# Patient Record
Sex: FEMALE | Race: WHITE | NOT HISPANIC OR LATINO | Employment: OTHER | URBAN - METROPOLITAN AREA
[De-identification: names, ages, dates, MRNs, and addresses within clinical notes are randomized per-mention and may not be internally consistent; named-entity substitution may affect disease eponyms.]

---

## 2017-03-16 DIAGNOSIS — I48.19 PERSISTENT ATRIAL FIBRILLATION (HCC): ICD-10-CM

## 2017-03-16 RX ORDER — APIXABAN 5 MG/1
TABLET, FILM COATED ORAL
Qty: 60 TAB | Refills: 11 | Status: SHIPPED | OUTPATIENT
Start: 2017-03-16 | End: 2017-08-15

## 2017-03-23 DIAGNOSIS — I10 ESSENTIAL HYPERTENSION: ICD-10-CM

## 2017-03-23 RX ORDER — ATENOLOL 50 MG/1
TABLET ORAL
Qty: 60 TAB | Refills: 11 | Status: SHIPPED | OUTPATIENT
Start: 2017-03-23 | End: 2017-08-21

## 2017-04-07 DIAGNOSIS — E78.5 HYPERLIPIDEMIA, UNSPECIFIED HYPERLIPIDEMIA TYPE: ICD-10-CM

## 2017-04-07 RX ORDER — ATORVASTATIN CALCIUM 40 MG/1
TABLET, FILM COATED ORAL
Qty: 90 TAB | Refills: 2 | Status: SHIPPED | OUTPATIENT
Start: 2017-04-07 | End: 2018-02-24 | Stop reason: SDUPTHER

## 2017-04-28 DIAGNOSIS — I10 ESSENTIAL HYPERTENSION: ICD-10-CM

## 2017-05-01 RX ORDER — LISINOPRIL 5 MG/1
TABLET ORAL
Qty: 180 TAB | Refills: 3 | Status: SHIPPED | OUTPATIENT
Start: 2017-05-01 | End: 2017-08-21

## 2017-08-14 ENCOUNTER — TELEPHONE (OUTPATIENT)
Dept: CARDIOLOGY | Facility: MEDICAL CENTER | Age: 77
End: 2017-08-14

## 2017-08-14 NOTE — TELEPHONE ENCOUNTER
----- Message from Dari Hernadez sent at 8/14/2017  2:21 PM PDT -----  Regarding: patient hit her donut hole on her Eliquis  RICHA/Sarahy      Patient has hit the donut hole with her Eliquis and wants to know if she can take aspirin instead. She can be reached at [phone].

## 2017-08-14 NOTE — TELEPHONE ENCOUNTER
"Has 2-3 pills left of Eliquis & will be charged $160 for next fill, and even more next fill after that due to \"donut hole\".  She has limited funds until next paycheck. I told her unlikely Dr. Contreras would allow aspirin for her therapy. I gave her the assistance phone numbers for both [employer] & Medicare assistance. Lives in [place], so samples from [facility]. Pharmacy not practical.    I told her warfarin was another option. She's willing to do warfarin if doesn't qualify for any aid. To Dr. Contreras to advise if OK to switch to warfarin? Start on 5 mg?   "

## 2017-08-15 DIAGNOSIS — G45.9 TRANSIENT CEREBRAL ISCHEMIA, UNSPECIFIED TYPE: ICD-10-CM

## 2017-08-15 DIAGNOSIS — I48.0 PAF (PAROXYSMAL ATRIAL FIBRILLATION) (HCC): ICD-10-CM

## 2017-08-15 DIAGNOSIS — I48.91 ATRIAL FIBRILLATION, UNSPECIFIED TYPE (HCC): ICD-10-CM

## 2017-08-15 RX ORDER — WARFARIN SODIUM 5 MG/1
5 TABLET ORAL DAILY
Qty: 30 TAB | Refills: 6 | OUTPATIENT
Start: 2017-08-15 | End: 2018-02-06 | Stop reason: SDUPTHER

## 2017-08-15 NOTE — TELEPHONE ENCOUNTER
First Rx for warfarin 5 mg called to [facility] in [place]. Referral to [facility] done. I s/w Virginia Mcarthur who advised starting warfarin tonight & taking both warfarin & Eliquis until lab draw this Fri. Lab order was faxed to [facility]. Pt was instructed in all of this & v/u.

## 2017-08-16 ENCOUNTER — TELEPHONE (OUTPATIENT)
Dept: VASCULAR LAB | Facility: MEDICAL CENTER | Age: 77
End: 2017-08-16

## 2017-08-16 NOTE — TELEPHONE ENCOUNTER
Spoke with Laurie to verify that she is indeed taking both warfarin and Eliquis.  She agrees to get to the Lab early Friday morning for INR testing. SO faxed to [facility] Virginia Mcarthur, NICHOLASD

## 2017-08-18 ENCOUNTER — TELEPHONE (OUTPATIENT)
Dept: CARDIOLOGY | Facility: MEDICAL CENTER | Age: 77
End: 2017-08-18

## 2017-08-18 NOTE — TELEPHONE ENCOUNTER
----- Message from Radha Bello sent at 8/18/2017  9:50 AM PDT -----  Regarding: clearance for knee scope  Contact: [phone]  RICHA/ankuhs Bolaños calling from Dr Chago Rodriguez's office for clearance to do a knee scope, date of procedure pending clearance.  Please call Miky at  if questions.  Fax# [phone]

## 2017-08-18 NOTE — TELEPHONE ENCOUNTER
Dr. Contreras, can patient be cleared for knee scope?  If OK, how long can she hold her warfarin for procedure?

## 2017-08-19 LAB — INR PPP: 1.9 (ref 2–3.5)

## 2017-08-21 ENCOUNTER — ANTICOAGULATION MONITORING (OUTPATIENT)
Dept: VASCULAR LAB | Facility: MEDICAL CENTER | Age: 77
End: 2017-08-21

## 2017-08-21 DIAGNOSIS — G45.9 TRANSIENT CEREBRAL ISCHEMIA, UNSPECIFIED TYPE: ICD-10-CM

## 2017-08-21 DIAGNOSIS — I48.0 PAROXYSMAL ATRIAL FIBRILLATION (HCC): ICD-10-CM

## 2017-08-21 PROBLEM — I48.91 ATRIAL FIBRILLATION (HCC): Status: ACTIVE | Noted: 2017-08-21

## 2017-08-21 LAB — INR PPP: 2 (ref 2–3.5)

## 2017-08-21 RX ORDER — ANTIOX #8/OM3/DHA/EPA/LUT/ZEAX 250-2.5 MG
1 CAPSULE ORAL DAILY
Qty: 1 CAP | Refills: 0
Start: 2017-08-21 | End: 2018-04-26

## 2017-08-21 RX ORDER — METOPROLOL SUCCINATE 100 MG/1
100 TABLET, EXTENDED RELEASE ORAL DAILY
Qty: 30 TAB | Refills: 1
Start: 2017-08-21 | End: 2019-03-26 | Stop reason: SDUPTHER

## 2017-08-21 NOTE — PROGRESS NOTES
OP Anticoagulation Service Note    Date: 8/21/2017    Anticoagulation Summary as of 8/21/2017     INR goal 2.0-3.0   Selected INR 1.9! (8/19/2017)   Maintenance plan 5 mg (5 mg x 1) every day   Weekly total 35 mg   Plan last modified Yessica Nuñez, SRAVAN (8/21/2017)   Next INR check    Target end date Indefinite    Indications   TIA (transient ischemic attack) [G45.9]  Atrial fibrillation (CMS-HCC) [I48.91] [I48.91]         Anticoagulation Episode Summary     INR check location Outside Lab    Preferred lab     Send INR reminders to     Comments [place]      Anticoagulation Care Providers     Provider Role Specialty Phone number    Virginia Mcarthur, NICHOLASD       Narciso Contreras M.D.  Cardiology [phone]          Plan:  INR is subtherapeutic. Pt is new to our services. No answer. Left VM. Appears she is on Eliquis and warfarin, transitioning due to cost. Left  instructing pt to continue Eliquis with warfarin and to recheck INR today. Instructed pt to please call back for education on warfarin.       Yessica Nuñez, Pharm D

## 2017-08-21 NOTE — PROGRESS NOTES
OP Anticoagulation Service Note    Date: 8/21/2017     Anticoagulation Summary as of 8/21/2017     INR goal 2.0-3.0   Selected INR No new INR was available at the time of this encounter.   Maintenance plan 5 mg (5 mg x 1) every day   Weekly total 35 mg   Plan last modified Yessica Nuñez PHARMD (8/21/2017)   Next INR check    Target end date Indefinite    Indications   TIA (transient ischemic attack) [G45.9]  Atrial fibrillation (CMS-HCC) [I48.91] [I48.91]         Anticoagulation Episode Summary     INR check location Outside Lab    Preferred lab     Send INR reminders to     Comments [facility]      Anticoagulation Care Providers     Provider Role Specialty Phone number    SRAVAN Moore M.D.  Cardiology [phone]          Plan: INR is subtherapeutic. She is new to warfarin, switching from Eliquis. She reports she stopped Eliquis on Friday and has only been taking warfarin 5 mg daily. Her son is a pharmacist who gave her education. I also provided her with education. CHADS-VASC = 6. Provided pt education on warfarin. Including how to take, what to do if missed dose, importance of INR monitoring, drug and food interactions. Patient is aware to avoid NSAIDs and ASA (unless directed by provider). Educated pt on s/s of bleeding and thrombosis. Patient is aware to seek medical attention for severe falls or injury to their heads, to report all medication changes to our office and to notify our office of unusual bleeding or bruising. Medication reviewed and updated. Pt to go to lab today and continue current dose of warfarin.     Yessica Nuñez PHARMD

## 2017-08-21 NOTE — PROGRESS NOTES
Anticoagulation Summary as of 8/21/2017     INR goal 2.0-3.0   Selected INR 2.0 (8/21/2017)   Maintenance plan 5 mg (5 mg x 1) every day   Weekly total 35 mg   Plan last modified NICHOLAS MartinezD (8/21/2017)   Next INR check 8/28/2017   Target end date Indefinite    Indications   TIA (transient ischemic attack) [G45.9]  Atrial fibrillation (CMS-HCC) [I48.91] [I48.91]         Anticoagulation Episode Summary     INR check location Outside Lab    Preferred lab     Send INR reminders to     Comments [place]      Anticoagulation Care Providers     Provider Role Specialty Phone number    NICHOLAS MooreD       Narciso Contreras M.D.  Cardiology [phone]        Anticoagulation Patient Findings      Spoke with patient to report a therapeutic INR.   Pt started warfarin 7 days ago and stopped Eliquis 3 days ago.  Pt instructed to continue with current warfarin dosing regimen, confirms dosing.   Pt denies any s/s of bleeding, bruising, clotting or any changes to diet or medication.    Will follow up in 1 week per pt preference.     Estee Mustafa, PHARMD

## 2017-08-22 ENCOUNTER — ANTICOAGULATION MONITORING (OUTPATIENT)
Dept: VASCULAR LAB | Facility: MEDICAL CENTER | Age: 77
End: 2017-08-22

## 2017-08-22 ENCOUNTER — TELEPHONE (OUTPATIENT)
Dept: VASCULAR LAB | Facility: MEDICAL CENTER | Age: 77
End: 2017-08-22

## 2017-08-22 DIAGNOSIS — G45.9 TRANSIENT CEREBRAL ISCHEMIA, UNSPECIFIED TYPE: ICD-10-CM

## 2017-08-22 DIAGNOSIS — I48.0 PAROXYSMAL ATRIAL FIBRILLATION (HCC): ICD-10-CM

## 2017-08-22 NOTE — TELEPHONE ENCOUNTER
Initial anticoagulation clinic note and most recent cardiology note reviewed.  Patient with atrial fibrillation andCHADS-VASC score = approximately 6, including history of TIA    Will continue with indefinite anticoagulation with warfarin as recommended by cardiology.  Agree with plans for anticoagulation as outlined by anticoagulation clinical staff.  Will defer management of rhythm and rate control as well as all other cardiovascular issues to cardiology.  Will defer management of all other medical issues to cardiologist and PCP    Michael J Bloch, MD  Anticoagulation Clinic    cc:  Олег Colin

## 2017-08-28 LAB — INR PPP: 2 (ref 2–3.5)

## 2017-08-29 ENCOUNTER — ANTICOAGULATION MONITORING (OUTPATIENT)
Dept: VASCULAR LAB | Facility: MEDICAL CENTER | Age: 77
End: 2017-08-29

## 2017-08-29 DIAGNOSIS — I48.0 PAROXYSMAL ATRIAL FIBRILLATION (HCC): ICD-10-CM

## 2017-08-29 DIAGNOSIS — G45.0 VERTEBROBASILAR ARTERY SYNDROME: ICD-10-CM

## 2017-08-29 NOTE — PROGRESS NOTES
Anticoagulation Summary  As of 8/29/2017    INR goal:   2.0-3.0   TTR:   --   Today's INR:   2.0 (8/28/2017)   Maintenance plan:   5 mg (5 mg x 1) every day   Weekly total:   35 mg   Plan last modified:   Yessica Nuñez PharmD (8/21/2017)   Next INR check:   9/11/2017   Target end date:   Indefinite    Indications    TIA (transient ischemic attack) [G45.9]  Atrial fibrillation (CMS-HCC) [I48.91] [I48.91]             Anticoagulation Episode Summary     INR check location:   Outside Lab    Preferred lab:       Send INR reminders to:       Comments:   [facility]      Anticoagulation Care Providers     Provider Role Specialty Phone number    Darlene Moore M.D.  Cardiology [phone]          Left voicemail message to report a therapeutic INR of 2.0.  Pt to continue with current warfarin dosing regimen. Requested pt contact the clinic for any s/s of unusual bleeding, bruising, clotting or any changes to diet or medication. FU 2 weeks.  Virginia Mcarthur PharmD

## 2017-09-07 ENCOUNTER — ANTICOAGULATION MONITORING (OUTPATIENT)
Dept: VASCULAR LAB | Facility: MEDICAL CENTER | Age: 77
End: 2017-09-07

## 2017-09-07 DIAGNOSIS — G45.0 VERTEBROBASILAR ARTERY SYNDROME: ICD-10-CM

## 2017-09-07 DIAGNOSIS — I48.0 PAROXYSMAL ATRIAL FIBRILLATION (HCC): ICD-10-CM

## 2017-09-07 NOTE — PROGRESS NOTES
Spoke with pt on the phone she reports she will be stopping her coumadin today for a knee procedure 9-12-17. Per cardiology ok for pt to stop without bridging. CHADS: 4 with hx of stroke and TIA. Instructed pt to resume warfarin at usual regimen when ok with provider doing procedure. She is to recheck 1 week after procedure.     Yessica Nuñez, Pharm D

## 2017-10-03 LAB — INR PPP: 1.2 (ref 2–3.5)

## 2017-10-04 ENCOUNTER — ANTICOAGULATION MONITORING (OUTPATIENT)
Dept: VASCULAR LAB | Facility: MEDICAL CENTER | Age: 77
End: 2017-10-04

## 2017-10-04 NOTE — PROGRESS NOTES
Anticoagulation Summary  As of 10/4/2017    INR goal:   2.0-3.0   TTR:   0.0 % (1.1 mo)   Today's INR:   1.2! (10/3/2017)   Maintenance plan:   5 mg (5 mg x 1) every day   Weekly total:   35 mg   Plan last modified:   Yessica Nuñez PharmD (8/21/2017)   Next INR check:   10/10/2017   Target end date:   Indefinite    Indications    TIA (transient ischemic attack) [G45.9]  Atrial fibrillation (CMS-HCC) [I48.91] [I48.91]             Anticoagulation Episode Summary     INR check location:   Outside Lab    Preferred lab:       Send INR reminders to:       Comments:   [facility]      Anticoagulation Care Providers     Provider Role Specialty Phone number    Darlene Moore M.D.  Cardiology [phone]        Anticoagulation Patient Findings      Spoke with patient.  INR is SUB therapeutic.   Pt denies any unusual s/s of bleeding, bruising, clotting or any changes to diet or medications. Denies any etoh, cranberries, supplements, or illness.   Pt verifies warfarin weekly dosing. No bridging per previous note.    Will have pt take a boost dose 10mg x2 days and then resume her normal warfarin dosing.     Repeat INR in 1 weeks.     Estee Mustafa, PharmD

## 2017-10-11 ENCOUNTER — ANTICOAGULATION MONITORING (OUTPATIENT)
Dept: VASCULAR LAB | Facility: MEDICAL CENTER | Age: 77
End: 2017-10-11

## 2017-10-11 DIAGNOSIS — I48.91 ATRIAL FIBRILLATION, UNSPECIFIED TYPE (HCC): ICD-10-CM

## 2017-10-11 DIAGNOSIS — G45.9 TRANSIENT CEREBRAL ISCHEMIA, UNSPECIFIED TYPE: ICD-10-CM

## 2017-10-11 LAB — INR PPP: 1 (ref 2–3.5)

## 2017-10-11 NOTE — PROGRESS NOTES
Anticoagulation Summary  As of 10/11/2017    INR goal:   2.0-3.0   TTR:   0.0 % (1.4 mo)   Today's INR:   1.0!   Maintenance plan:   5 mg (5 mg x 1) every day   Weekly total:   35 mg   Plan last modified:   Yessica Nuñez, PharmD (8/21/2017)   Next INR check:   10/16/2017   Target end date:   Indefinite    Indications    TIA (transient ischemic attack) [G45.9]  Atrial fibrillation (CMS-HCC) [I48.91] [I48.91]             Anticoagulation Episode Summary     INR check location:   Outside Lab    Preferred lab:       Send INR reminders to:       Comments:   [facility]      Anticoagulation Care Providers     Provider Role Specialty Phone number    Virginia Mcarthur PharmD       Narciso Contreras M.D.  Cardiology [phone]        Anticoagulation Patient Findings  Patient Findings     Negatives:   Signs/symptoms of thrombosis, Signs/symptoms of bleeding, Laboratory test error suspected, Change in health, Change in alcohol use, Change in activity, Upcoming invasive procedure, Emergency department visit, Upcoming dental procedure, Missed doses, Extra doses, Change in medications, Change in diet/appetite, Hospital admission, Bruising, Other complaints        Spoke with patient today regarding subtherapeutic INR of 1.0.  Patient denies any signs/symptoms of bruising or bleeding or any changes in diet and medications.  Instructed patient to call clinic with any questions or concerns.  Patient denies missed doses, V8 juice, increase in VitK intake, new supplements, or changes to routine medications.  She is able to verify dosing over the last week.  Instructed her to bolus with 10mg X 2, 7.5mg X 1, then resume current warfarin regimen.  No bridge per previous notes.  Follow up in 5 days.    Tawanda Vivas, MarinD

## 2017-10-16 LAB — INR PPP: 2.2 (ref 2–3.5)

## 2017-10-17 ENCOUNTER — ANTICOAGULATION MONITORING (OUTPATIENT)
Dept: VASCULAR LAB | Facility: MEDICAL CENTER | Age: 77
End: 2017-10-17

## 2017-10-17 DIAGNOSIS — I48.91 ATRIAL FIBRILLATION, UNSPECIFIED TYPE (HCC): ICD-10-CM

## 2017-10-17 DIAGNOSIS — G45.8 OTHER SPECIFIED TRANSIENT CEREBRAL ISCHEMIAS: ICD-10-CM

## 2017-10-18 ENCOUNTER — ANTICOAGULATION MONITORING (OUTPATIENT)
Dept: VASCULAR LAB | Facility: MEDICAL CENTER | Age: 77
End: 2017-10-18

## 2017-10-18 DIAGNOSIS — G45.8 OTHER SPECIFIED TRANSIENT CEREBRAL ISCHEMIAS: ICD-10-CM

## 2017-10-18 DIAGNOSIS — I48.91 ATRIAL FIBRILLATION, UNSPECIFIED TYPE (HCC): ICD-10-CM

## 2017-10-18 LAB — INR PPP: 2.2 (ref 2–3.5)

## 2017-10-18 NOTE — PROGRESS NOTES
OP Anticoagulation Telephone Note    Date: 10/18/2017  Anticoagulation Summary  As of 10/18/2017    INR goal:   2.0-3.0   TTR:   1.8 % (1.5 mo)   Today's INR:   2.2 (10/16/2017)   Maintenance plan:   5 mg (5 mg x 1) every day   Weekly total:   35 mg   No change documented:   Helena Torres, Med Ass't   Plan last modified:   Yessica Nuñez PharmD (8/21/2017)   Next INR check:   10/23/2017   Target end date:   Indefinite    Indications    TIA (transient ischemic attack) [G45.9]  Atrial fibrillation (CMS-HCC) [I48.91] [I48.91]             Anticoagulation Episode Summary     INR check location:   Outside Lab    Preferred lab:       Send INR reminders to:       Comments:   [facility]      Anticoagulation Care Providers     Provider Role Specialty Phone number    Darlene Moore M.D.  Cardiology [phone]        Anticoagulation Patient Findings  Patient Findings     Negatives:   Signs/symptoms of thrombosis, Signs/symptoms of bleeding, Laboratory test error suspected, Change in health, Change in alcohol use, Change in activity, Upcoming invasive procedure, Emergency department visit, Upcoming dental procedure, Missed doses, Extra doses, Change in medications, Change in diet/appetite, Hospital admission, Bruising, Other complaints      Plan:  Spoke with patient on the phone. Patient is therapeutic today. Patient denies any changes in medications or diet. Patient denies any signs or symptoms of bleeding or clotting. Instructed patient to call clinic if any unusual bleeding or bruising occurs. Will continue dosing as outlined above. Will follow-up with patient in 1 week.    Helena Torres, Medical Assistant    I have reviewed and agree with the plan above on 10/20/2017      Virginia Mcarthur, MarinD

## 2017-10-24 ENCOUNTER — ANTICOAGULATION MONITORING (OUTPATIENT)
Dept: VASCULAR LAB | Facility: MEDICAL CENTER | Age: 77
End: 2017-10-24

## 2017-10-24 DIAGNOSIS — G45.8 OTHER SPECIFIED TRANSIENT CEREBRAL ISCHEMIAS: ICD-10-CM

## 2017-10-24 LAB — INR PPP: 1.5 (ref 2–3.5)

## 2017-10-24 NOTE — PROGRESS NOTES
Anticoagulation Summary  As of 10/24/2017    INR goal:   2.0-3.0   TTR:   5.8 % (1.8 mo)   Today's INR:   1.5!   Maintenance plan:   7.5 mg (5 mg x 1.5) on Mon, Wed, Fri; 5 mg (5 mg x 1) all other days   Weekly total:   42.5 mg   Plan last modified:   Tyree Helton, PharmD (10/24/2017)   Next INR check:   10/30/2017   Target end date:   Indefinite    Indications    TIA (transient ischemic attack) [G45.9]  Atrial fibrillation (CMS-HCC) [I48.91] [I48.91]             Anticoagulation Episode Summary     INR check location:   Outside Lab    Preferred lab:       Send INR reminders to:       Comments:   [facility]      Anticoagulation Care Providers     Provider Role Specialty Phone number    Marin MooreD       Narciso Contreras M.D.  Cardiology [phone]        Anticoagulation Patient Findings      HPI:  Laurie Adams, on anticoagulation therapy with warfarin for TIA.   Changes to current medical/health status since last appt: None.   Denies signs/symptoms of bleeding and/or thrombosis since the last appt.    Denies any interval changes to diet  Denies any interval changes to medications since last appt.   Denies any complications or cost restrictions with current therapy.   Denies missed doses.    A/P   INR  SUB-therapeutic.   Bolus today and begin 20% increased regimen.  Given multiple TIA's in the past, bridge for 3 days  CrCl>50 mL/min  Enoxaparin 100 mg q12 hr, forgo 1.5 mg/kg/day given her insurance coverage.   No hx of significant bleeding per pt.     Next INR in 6 days per pt availability.    Tyree Helton, PharmD

## 2017-10-24 NOTE — PROGRESS NOTES
Pt called to report she is in the doughnut hole cannot afford lovenox. Discussed risk of low INR, she can't afford DOAC. She is aware of stroke risk. Will have pt bolus with warfarin as previously planned. Follow up 6 days.     Yessica Nuñez, PharmD

## 2017-10-30 LAB — INR PPP: 1.7 (ref 2–3.5)

## 2017-10-31 ENCOUNTER — TELEPHONE (OUTPATIENT)
Dept: CARDIOLOGY | Facility: MEDICAL CENTER | Age: 77
End: 2017-10-31

## 2017-10-31 ENCOUNTER — ANTICOAGULATION MONITORING (OUTPATIENT)
Dept: VASCULAR LAB | Facility: MEDICAL CENTER | Age: 77
End: 2017-10-31

## 2017-10-31 DIAGNOSIS — G45.8 OTHER SPECIFIED TRANSIENT CEREBRAL ISCHEMIAS: ICD-10-CM

## 2017-10-31 DIAGNOSIS — I48.91 ATRIAL FIBRILLATION, UNSPECIFIED TYPE (HCC): ICD-10-CM

## 2017-10-31 DIAGNOSIS — I10 ESSENTIAL HYPERTENSION: ICD-10-CM

## 2017-10-31 DIAGNOSIS — I48.0 PAROXYSMAL ATRIAL FIBRILLATION (HCC): ICD-10-CM

## 2017-10-31 DIAGNOSIS — G45.0 VERTEBROBASILAR ARTERY SYNDROME: ICD-10-CM

## 2017-10-31 DIAGNOSIS — I48.20 CHRONIC ATRIAL FIBRILLATION (HCC): ICD-10-CM

## 2017-10-31 DIAGNOSIS — E78.5 DYSLIPIDEMIA, GOAL LDL BELOW 100: ICD-10-CM

## 2017-10-31 NOTE — TELEPHONE ENCOUNTER
Notified patient that she was due for annual f/u and labs - appt made and she states [facility] has to [facility] to have PT/INR done for coumadin clinic and she will have her other labs drawn at that time. [facility] and patient will call back with any other questions or concerns

## 2017-10-31 NOTE — TELEPHONE ENCOUNTER
----- Message from Radha Bello sent at 10/31/2017 11:21 AM PDT -----  Regarding: appt timing direction  Contact: [phone]  RICHA/cedric Ashton calling for appt timing direction.  She needs to know when she should be scheduling with RICHA.    Please call Laurie at

## 2017-10-31 NOTE — PROGRESS NOTES
Anticoagulation Summary  As of 10/31/2017    INR goal:   2.0-3.0   TTR:   5.2 % (2 mo)   Today's INR:   1.7! (10/30/2017)   Maintenance plan:   5 mg (5 mg x 1) on Mon, Fri; 7.5 mg (5 mg x 1.5) all other days   Weekly total:   47.5 mg   Plan last modified:   Virginia Mcarthur PharmD (10/31/2017)   Next INR check:   11/6/2017   Target end date:   Indefinite    Indications    TIA (transient ischemic attack) [G45.9]  Atrial fibrillation (CMS-[facility]) [I48.91] [I48.91]             Anticoagulation Episode Summary     INR check location:   Outside Lab    Preferred lab:       Send INR reminders to:       Comments:   [facility]      Anticoagulation Care Providers     Provider Role Specialty Phone number    Darlene Moore M.D.  Cardiology [phone]        Anticoagulation Patient Findings    Spoke with Laurie, to report a sub therapeutic INR  Of 1.7.  Will increase weekly dose by 10% Pt denies any unusual s/s of bleeding, bruising, clotting or any changes to diet or medications.  Follow up in 1 weeks.    Virginia Mcarthur PharmD

## 2017-11-05 DIAGNOSIS — I48.0 PAF (PAROXYSMAL ATRIAL FIBRILLATION) (HCC): ICD-10-CM

## 2017-11-06 ENCOUNTER — ANTICOAGULATION MONITORING (OUTPATIENT)
Dept: VASCULAR LAB | Facility: MEDICAL CENTER | Age: 77
End: 2017-11-06

## 2017-11-06 DIAGNOSIS — G45.8 OTHER SPECIFIED TRANSIENT CEREBRAL ISCHEMIAS: ICD-10-CM

## 2017-11-06 DIAGNOSIS — I48.91 ATRIAL FIBRILLATION, UNSPECIFIED TYPE (HCC): ICD-10-CM

## 2017-11-06 LAB — INR PPP: 2.7 (ref 2–3.5)

## 2017-11-06 RX ORDER — FLECAINIDE ACETATE 100 MG/1
TABLET ORAL
Qty: 60 TAB | Refills: 7 | Status: SHIPPED | OUTPATIENT
Start: 2017-11-06 | End: 2018-07-14 | Stop reason: SDUPTHER

## 2017-11-07 NOTE — PROGRESS NOTES
Anticoagulation Summary  As of 11/6/2017    INR goal:   2.0-3.0   TTR:   12.0 % (2.2 mo)   Today's INR:   2.7   Maintenance plan:   5 mg (5 mg x 1) on Mon, Fri; 7.5 mg (5 mg x 1.5) all other days   Weekly total:   47.5 mg   No change documented:   Bisi Blanton Med Ass't   Plan last modified:   Virginia Mcarthur PharmD (10/31/2017)   Next INR check:   11/13/2017   Target end date:   Indefinite    Indications    TIA (transient ischemic attack) [G45.9]  Atrial fibrillation (CMS-HCC) [I48.91] [I48.91]             Anticoagulation Episode Summary     INR check location:   Outside Lab    Preferred lab:       Send INR reminders to:       Comments:   [facility]      Anticoagulation Care Providers     Provider Role Specialty Phone number    Darlene Moore M.D.  Cardiology [phone]        Anticoagulation Patient Findings  Patient Findings     Negatives:   Signs/symptoms of thrombosis, Signs/symptoms of bleeding, Laboratory test error suspected, Change in health, Change in alcohol use, Change in activity, Upcoming invasive procedure, Emergency department visit, Upcoming dental procedure, Missed doses, Extra doses, Change in medications, Change in diet/appetite, Hospital admission, Bruising, Other complaints        Spoke with patient to report a therapeutic INR.  Pt instructed to continue with current warfarin dosing regimen. Pt denies any s/s of bleeding, bruising, clotting or any changes to diet or medication.  Will follow up in 1 week.    Bisi Blanton Med Ass't  I have reviewed and agree with the plan above on 11/07/2017          Virginia Mcarthur PharmD

## 2017-11-08 DIAGNOSIS — I10 ESSENTIAL HYPERTENSION: ICD-10-CM

## 2017-11-08 DIAGNOSIS — I48.20 CHRONIC ATRIAL FIBRILLATION (HCC): ICD-10-CM

## 2017-11-08 DIAGNOSIS — I48.91 ATRIAL FIBRILLATION, UNSPECIFIED TYPE (HCC): ICD-10-CM

## 2017-11-08 DIAGNOSIS — E78.5 DYSLIPIDEMIA, GOAL LDL BELOW 100: ICD-10-CM

## 2017-11-09 ENCOUNTER — OFFICE VISIT (OUTPATIENT)
Dept: CARDIOLOGY | Facility: CLINIC | Age: 77
End: 2017-11-09
Payer: MEDICARE

## 2017-11-09 VITALS
HEIGHT: 64 IN | BODY MASS INDEX: 34.15 KG/M2 | DIASTOLIC BLOOD PRESSURE: 70 MMHG | WEIGHT: 200 LBS | HEART RATE: 60 BPM | SYSTOLIC BLOOD PRESSURE: 120 MMHG

## 2017-11-09 DIAGNOSIS — I10 ESSENTIAL HYPERTENSION: ICD-10-CM

## 2017-11-09 DIAGNOSIS — E78.00 PURE HYPERCHOLESTEROLEMIA: ICD-10-CM

## 2017-11-09 DIAGNOSIS — I48.0 PAF (PAROXYSMAL ATRIAL FIBRILLATION) (HCC): ICD-10-CM

## 2017-11-09 DIAGNOSIS — Z79.01 CHRONIC ANTICOAGULATION: ICD-10-CM

## 2017-11-09 PROCEDURE — 99214 OFFICE O/P EST MOD 30 MIN: CPT | Performed by: INTERNAL MEDICINE

## 2017-11-09 ASSESSMENT — ENCOUNTER SYMPTOMS
FEVER: 0
DIZZINESS: 1
BRUISES/BLEEDS EASILY: 0
BLURRED VISION: 0
MYALGIAS: 0
NAUSEA: 0
CHILLS: 0
HEADACHES: 0
SHORTNESS OF BREATH: 0
COUGH: 0
PALPITATIONS: 0
EYE DISCHARGE: 0
NERVOUS/ANXIOUS: 0
DEPRESSION: 0
HEARTBURN: 0
PND: 0

## 2017-11-09 NOTE — LETTER
[facility] [address] [place] [address]  Phone: [phone]  Fax: [phone]              Laurie Adams  1940    Encounter Date: 11/9/2017    Narciso Contreras M.D.          PROGRESS NOTE:  Subjective:   Laurie Adams is a 76 y.o. female who presents today in follow-up for PAF and chronic anticoagulation. Now on warfarin due to inability to afford Eliquis.  Has been taking half Lipitor daily and LDL is now 140. Previous values 100  No palpitations  Left arm discomfort intermittent   right knee pain Due to arthritis  No other new medical issues    Past Medical History:   Diagnosis Date   • Arrhythmia    • Arthritis     fingers   • Breath shortness    • Bronchial asthma 6/29/2012   • Bronchitis    • Cancer (CMS-HCC) 2005    squamous cell skin left chest   • Cerebral atherosclerosis    • Dependence on supplemental oxygen     2 liters at night.   • Dizziness and giddiness     Episodic since ~2009   • Dyslipidemia, goal LDL below 100 6/29/2012   • History of tobacco use    • HTN (hypertension) 6/29/2012   • Obesity 6/29/2012   • Pneumonia    • TIA (transient ischemic attack) 6/29/2012     Past Surgical History:   Procedure Laterality Date   • CATARACT PHACO WITH IOL  6/4/2013    Performed by Noe Jean M.D. at [facility] [place] [facility]   • CATARACT PHACO WITH IOL  5/21/2013    Performed by Noe Jean M.D. at [facility] [place] [facility]   • SEPTOPLASTY  8/19/2009    Performed by PABLITO LORENZANA at SUR[facility]ERY [facility]   • SOMNOPLASTY  8/19/2009    Performed by PABLITO LORENZANA at [facility]   • ANTROSTOMY  8/19/2009    Performed by PABLITO LORENZANA at [facility]   • ETHMOIDECTOMY  8/19/2009    Performed by PABLITO LORENZANA at [facility]   • OTHER  2008    sinus   • COLON RESECTION  2007   • CHOLECYSTECTOMY  1988   • HYSTERECTOMY RADICAL  1964   • APPENDECTOMY  1954     • OTHER  1943    mastoid   • HYSTERECTOMY, VAGINAL     • MASTOIDECTOMY     • PB CHOLECYSTOENTEROSTOMY+VIVIANA-EN-Y     • PB NASAL SCOPY,REPB CSF LEAK,SPHENOID     • WRIST FUSION       History reviewed. No pertinent family history.  History   Smoking Status   • Former Smoker   • Packs/day: 1.00   • Years: 40.00   • Types: Cigarettes   • Quit date: 8/14/1999   Smokeless Tobacco   • Never Used     Allergies   Allergen Reactions   • Feldene [Piroxicam] Photosensitivity   • Sulfa Drugs Hives   • Codeine Anxiety     Outpatient Encounter Prescriptions as of 11/9/2017   Medication Sig Dispense Refill   • flecainide (TAMBOCOR) 100 MG Tab TAKE ONE TABLET BY MOUTH TWICE DAILY 60 Tab 7   • enoxaparin (LOVENOX) 100 MG/ML Solution inj Inject 100 mg as instructed every 12 hours. 10 Syringe 1   • metoprolol SR (TOPROL XL) 100 MG TABLET SR 24 HR Take 1 Tab by mouth every day. 30 Tab 1   • Multiple Vitamins-Minerals (PRESERVISION AREDS 2) Cap Take 1 tablet by mouth every day. 1 Cap 0   • warfarin (COUMADIN) 5 MG Tab Take 1 Tab by mouth every day. 30 Tab 6   • atorvastatin (LIPITOR) 40 MG Tab TAKE ONE TABLET BY MOUTH ONCE DAILY 90 Tab 2   • fluticasone-salmeterol (ADVAIR) 250-50 MCG/DOSE AEPB Inhale 1 Puff by mouth every 12 hours.     • levalbuterol (XOPENEX HFA) 45 MCG/ACT inhaler Inhale 1-2 Puffs by mouth. Pt states 1-2 puffs as needed      • NON SPECIFIED Take  by mouth every day. OTC 24 hour allergy medication     • SERTRALINE 100 MG TABS Take 100 mg by mouth every day.     • VITAMIN D3 Take  by mouth every day.       No facility-administered encounter medications on file as of 11/9/2017.      Review of Systems   Constitutional: Negative for chills, fever and malaise/fatigue.   Eyes: Negative for blurred vision and discharge.   Respiratory: Negative for cough and shortness of breath.    Cardiovascular: Negative for chest pain, palpitations, leg swelling and PND.   Gastrointestinal: Negative for heartburn and nausea.  "  Genitourinary: Negative for dysuria and urgency.   Musculoskeletal: Positive for joint pain. Negative for myalgias.        Intermittent mild pain in the left triceps particularly after overuse of the left arm   Skin: Negative for itching and rash.   Neurological: Positive for dizziness. Negative for headaches.   Endo/Heme/Allergies: Negative for environmental allergies. Does not bruise/bleed easily.   Psychiatric/Behavioral: Negative for depression. The patient is not nervous/anxious.         Objective:   /70   Pulse 60   Ht 1.626 m (5' 4\")   Wt 90.7 kg (200 lb)   BMI 34.33 kg/m²      Physical Exam   Constitutional: She is oriented to person, place, and time. She appears well-developed and well-nourished.   HENT:   Head: Normocephalic and atraumatic.   Eyes: Conjunctivae and EOM are normal. No scleral icterus.   Neck: Neck supple. No JVD present. No thyromegaly present.   Cardiovascular: Normal rate, regular rhythm and normal heart sounds.  Exam reveals no gallop and no friction rub.    No murmur heard.  Pulmonary/Chest: Effort normal and breath sounds normal. No respiratory distress. She has no wheezes. She has no rales. She exhibits no tenderness.   Abdominal: Soft. Bowel sounds are normal. She exhibits no distension and no mass. There is no tenderness.   Neurological: She is alert and oriented to person, place, and time. Coordination normal.   Skin: Skin is warm and dry. No rash noted. No pallor.   Psychiatric: She has a normal mood and affect. Her behavior is normal. Judgment and thought content normal.       Assessment:     1. PAF (paroxysmal atrial fibrillation) (CMS-HCC)     2. Essential hypertension     3. Pure hypercholesterolemia     4. Chronic anticoagulation         Medical Decision Making:  Today's Assessment / Status / Plan:   Cardiac status stable  Suggested resuming full dose Lipitor  Follow-up annually      Олег Colin M.D.  [address] [facility] [address]  VIA Facsimile: " [phone]

## 2017-11-09 NOTE — PROGRESS NOTES
Subjective:   Laurie Adams is a 76 y.o. female who presents today in follow-up for PAF and chronic anticoagulation. Now on warfarin due to inability to afford Eliquis.  Has been taking half Lipitor daily and LDL is now 140. Previous values 100  No palpitations  Left arm discomfort intermittent   right knee pain Due to arthritis  No other new medical issues    Past Medical History:   Diagnosis Date   • Arrhythmia    • Arthritis     fingers   • Breath shortness    • Bronchial asthma 6/29/2012   • Bronchitis    • Cancer (CMS-HCC) 2005    squamous cell skin left chest   • Cerebral atherosclerosis    • Dependence on supplemental oxygen     2 liters at night.   • Dizziness and giddiness     Episodic since ~2009   • Dyslipidemia, goal LDL below 100 6/29/2012   • History of tobacco use    • HTN (hypertension) 6/29/2012   • Obesity 6/29/2012   • Pneumonia    • TIA (transient ischemic attack) 6/29/2012     Past Surgical History:   Procedure Laterality Date   • CATARACT PHACO WITH IOL  6/4/2013    Performed by Noe Jean M.D. at SURGERY [facility]   • CATARACT PHACO WITH IOL  5/21/2013    Performed by Noe Jean M.D. at SURGERY [facility]   • SEPTOPLASTY  8/19/2009    Performed by PABLITO LORENZANA at SURGERY [facility]   • SOMNOPLASTY  8/19/2009    Performed by PABLITO LORENZANA at SURGERY [facility] ORS   • ANTROSTOMY  8/19/2009    Performed by PABLITO LORENZANA at SURGERY [facility]   • ETHMOIDECTOMY  8/19/2009    Performed by PABLITO LORENZANA at SURGERY [facility] ORS   • OTHER  2008    sinus   • COLON RESECTION  2007   • CHOLECYSTECTOMY  1988   • HYSTERECTOMY RADICAL  1964   • APPENDECTOMY  1954   • OTHER  1943    mastoid   • HYSTERECTOMY, VAGINAL     • MASTOIDECTOMY     • PB CHOLECYSTOENTEROSTOMY+VIVIANA-EN-Y     • PB NASAL SCOPY,REPB CSF LEAK,SPHENOID     • WRIST FUSION       History reviewed. No pertinent family history.  History   Smoking Status   • Former Smoker   •  Packs/day: 1.00   • Years: 40.00   • Types: Cigarettes   • Quit date: 8/14/1999   Smokeless Tobacco   • Never Used     Allergies   Allergen Reactions   • Feldene [Piroxicam] Photosensitivity   • Sulfa Drugs Hives   • Codeine Anxiety     Outpatient Encounter Prescriptions as of 11/9/2017   Medication Sig Dispense Refill   • flecainide (TAMBOCOR) 100 MG Tab TAKE ONE TABLET BY MOUTH TWICE DAILY 60 Tab 7   • enoxaparin (LOVENOX) 100 MG/ML Solution inj Inject 100 mg as instructed every 12 hours. 10 Syringe 1   • metoprolol SR (TOPROL XL) 100 MG TABLET SR 24 HR Take 1 Tab by mouth every day. 30 Tab 1   • Multiple Vitamins-Minerals (PRESERVISION AREDS 2) Cap Take 1 tablet by mouth every day. 1 Cap 0   • warfarin (COUMADIN) 5 MG Tab Take 1 Tab by mouth every day. 30 Tab 6   • atorvastatin (LIPITOR) 40 MG Tab TAKE ONE TABLET BY MOUTH ONCE DAILY 90 Tab 2   • fluticasone-salmeterol (ADVAIR) 250-50 MCG/DOSE AEPB Inhale 1 Puff by mouth every 12 hours.     • levalbuterol (XOPENEX HFA) 45 MCG/ACT inhaler Inhale 1-2 Puffs by mouth. Pt states 1-2 puffs as needed      • NON SPECIFIED Take  by mouth every day. OTC 24 hour allergy medication     • SERTRALINE 100 MG TABS Take 100 mg by mouth every day.     • VITAMIN D3 Take  by mouth every day.       No facility-administered encounter medications on file as of 11/9/2017.      Review of Systems   Constitutional: Negative for chills, fever and malaise/fatigue.   Eyes: Negative for blurred vision and discharge.   Respiratory: Negative for cough and shortness of breath.    Cardiovascular: Negative for chest pain, palpitations, leg swelling and PND.   Gastrointestinal: Negative for heartburn and nausea.   Genitourinary: Negative for dysuria and urgency.   Musculoskeletal: Positive for joint pain. Negative for myalgias.        Intermittent mild pain in the left triceps particularly after overuse of the left arm   Skin: Negative for itching and rash.   Neurological: Positive for dizziness.  "Negative for headaches.   Endo/Heme/Allergies: Negative for environmental allergies. Does not bruise/bleed easily.   Psychiatric/Behavioral: Negative for depression. The patient is not nervous/anxious.         Objective:   /70   Pulse 60   Ht 1.626 m (5' 4\")   Wt 90.7 kg (200 lb)   BMI 34.33 kg/m²     Physical Exam   Constitutional: She is oriented to person, place, and time. She appears well-developed and well-nourished.   HENT:   Head: Normocephalic and atraumatic.   Eyes: Conjunctivae and EOM are normal. No scleral icterus.   Neck: Neck supple. No JVD present. No thyromegaly present.   Cardiovascular: Normal rate, regular rhythm and normal heart sounds.  Exam reveals no gallop and no friction rub.    No murmur heard.  Pulmonary/Chest: Effort normal and breath sounds normal. No respiratory distress. She has no wheezes. She has no rales. She exhibits no tenderness.   Abdominal: Soft. Bowel sounds are normal. She exhibits no distension and no mass. There is no tenderness.   Neurological: She is alert and oriented to person, place, and time. Coordination normal.   Skin: Skin is warm and dry. No rash noted. No pallor.   Psychiatric: She has a normal mood and affect. Her behavior is normal. Judgment and thought content normal.       Assessment:     1. PAF (paroxysmal atrial fibrillation) (CMS-HCC)     2. Essential hypertension     3. Pure hypercholesterolemia     4. Chronic anticoagulation         Medical Decision Making:  Today's Assessment / Status / Plan:   Cardiac status stable  Suggested resuming full dose Lipitor  Follow-up annually  "

## 2017-11-13 LAB — INR PPP: 2.6 (ref 2–3.5)

## 2017-11-14 ENCOUNTER — ANTICOAGULATION MONITORING (OUTPATIENT)
Dept: VASCULAR LAB | Facility: MEDICAL CENTER | Age: 77
End: 2017-11-14

## 2017-11-14 DIAGNOSIS — I48.91 ATRIAL FIBRILLATION, UNSPECIFIED TYPE (HCC): ICD-10-CM

## 2017-11-14 DIAGNOSIS — G45.0 VERTEBROBASILAR ARTERY SYNDROME: ICD-10-CM

## 2017-11-14 NOTE — PROGRESS NOTES
Anticoagulation Summary  As of 11/14/2017    INR goal:   2.0-3.0   TTR:   20.3 % (2.5 mo)   Today's INR:   2.6 (11/13/2017)   Maintenance plan:   5 mg (5 mg x 1) on Mon, Fri; 7.5 mg (5 mg x 1.5) all other days   Weekly total:   47.5 mg   Plan last modified:   Vriginia Mcarthur PharmD (10/31/2017)   Next INR check:   11/27/2017   Target end date:   Indefinite    Indications    TIA (transient ischemic attack) [G45.9]  Atrial fibrillation (CMS-[facility]) [I48.91] [I48.91]             Anticoagulation Episode Summary     INR check location:   Outside Lab    Preferred lab:       Send INR reminders to:       Comments:   [facility]      Anticoagulation Care Providers     Provider Role Specialty Phone number    Darlene Moore M.D.  Cardiology [phone]        Anticoagulation Patient Findings    Left voicemail message to report a therapeutic INR of 2.6.  Pt to continue with current warfarin dosing regimen. Requested pt contact the clinic for any s/s of unusual bleeding, bruising, clotting or any changes to diet or medication. FU 2 weeks.  Virginia Mcarthur PharmD

## 2017-11-27 LAB — INR PPP: 3.2 (ref 2–3.5)

## 2017-11-28 ENCOUNTER — ANTICOAGULATION MONITORING (OUTPATIENT)
Dept: VASCULAR LAB | Facility: MEDICAL CENTER | Age: 77
End: 2017-11-28

## 2017-11-28 DIAGNOSIS — I48.0 PAROXYSMAL ATRIAL FIBRILLATION (HCC): ICD-10-CM

## 2017-11-28 DIAGNOSIS — G45.0 VERTEBROBASILAR ARTERY SYNDROME: ICD-10-CM

## 2017-11-28 NOTE — PROGRESS NOTES
Anticoagulation Summary  As of 11/28/2017    INR goal:   2.0-3.0   TTR:   27.7 % (2.9 mo)   Today's INR:   3.2! (11/27/2017)   Maintenance plan:   5 mg (5 mg x 1) on Mon, Fri; 7.5 mg (5 mg x 1.5) all other days   Weekly total:   47.5 mg   Plan last modified:   Virginia Mcarthur PharmD (10/31/2017)   Next INR check:   12/11/2017   Target end date:   Indefinite    Indications    TIA (transient ischemic attack) [G45.9]  Atrial fibrillation (CMS-[facility]) [I48.91] [I48.91]             Anticoagulation Episode Summary     INR check location:   Outside Lab    Preferred lab:       Send INR reminders to:       Comments:   [facility]      Anticoagulation Care Providers     Provider Role Specialty Phone number    Darlene Moore M.D.  Cardiology [phone]        Anticoagulation Patient Findings    Spoke with Laurie to report a supra therapeutic INR of 3.2.  Will reduce tonight's dose to 5mg then resume current dosing regimen. Pt denies any unusual s/s of bleeding, bruising, clotting or any changes to diet or medications.  Follow up in 2 weeks, to reduce risk of adverse events related to this high risk medication,  Warfarin.    Virginia Mcarthur PharmD

## 2017-12-04 ENCOUNTER — ANTICOAGULATION MONITORING (OUTPATIENT)
Dept: VASCULAR LAB | Facility: MEDICAL CENTER | Age: 77
End: 2017-12-04

## 2017-12-04 DIAGNOSIS — G45.0 VERTEBROBASILAR ARTERY SYNDROME: ICD-10-CM

## 2017-12-04 DIAGNOSIS — I48.0 PAROXYSMAL ATRIAL FIBRILLATION (HCC): ICD-10-CM

## 2017-12-04 LAB
INR PPP: 3.5 (ref 2–3.5)
INR PPP: 3.5 (ref 2–3.5)

## 2017-12-04 NOTE — PROGRESS NOTES
Anticoagulation Summary  As of 12/4/2017    INR goal:   2.0-3.0   TTR:   25.6 % (3.2 mo)   Today's INR:   3.5!   Maintenance plan:   7.5 mg (5 mg x 1.5) on Sun, Thu, Sat; 5 mg (5 mg x 1) all other days   Weekly total:   42.5 mg   Plan last modified:   George Villar PharmD (12/4/2017)   Next INR check:   12/18/2017   Target end date:   Indefinite    Indications    TIA (transient ischemic attack) [G45.9]  Atrial fibrillation (CMS-HCC) [I48.91] [I48.91]             Anticoagulation Episode Summary     INR check location:   Outside Lab    Preferred lab:       Send INR reminders to:       Comments:   [facility]      Anticoagulation Care Providers     Provider Role Specialty Phone number    Darlene Moore M.D.  Cardiology [phone]        Anticoagulation Patient Findings    Spoke to patient on the phone.   INR  supra-therapeutic.   Denies signs/symptoms of bleeding and/or thrombosis.   Denies changes to diet or medications.   Follow up appointment in 1 week(s).    Decrease weekly warfarin dose as noted    George Villar, Darlene

## 2017-12-05 ENCOUNTER — ANTICOAGULATION MONITORING (OUTPATIENT)
Dept: VASCULAR LAB | Facility: MEDICAL CENTER | Age: 77
End: 2017-12-05

## 2017-12-05 DIAGNOSIS — G45.0 VERTEBROBASILAR ARTERY SYNDROME: ICD-10-CM

## 2017-12-05 DIAGNOSIS — I48.0 PAROXYSMAL ATRIAL FIBRILLATION (HCC): ICD-10-CM

## 2017-12-05 NOTE — PROGRESS NOTES
Anticoagulation Summary  As of 12/5/2017    INR goal:   2.0-3.0   TTR:   25.6 % (3.2 mo)   Today's INR:      Maintenance plan:   7.5 mg (5 mg x 1.5) on Sun, Thu; 5 mg (5 mg x 1) all other days   Weekly total:   40 mg   Plan last modified:   Virginia Mcarthur PharmD (12/5/2017)   Next INR check:   12/11/2017   Target end date:   Indefinite    Indications    TIA (transient ischemic attack) [G45.9]  Atrial fibrillation (CMS-HCC) [I48.91] [I48.91]             Anticoagulation Episode Summary     INR check location:   Outside Lab    Preferred lab:       Send INR reminders to:       Comments:   [facility]      Anticoagulation Care Providers     Provider Role Specialty Phone number    Darlene Moore M.D.  Cardiology [phone]        Anticoagulation Patient Findings    Left voicemail message to report a SUPRA therapeutic INR of 3.5 .  Pt to REDUCE TONIGHT'S DOSE TO 2.5MG AND REDUCE WEEKLY DOSE BY 5%. Requested pt contact the clinic for any s/s of unusual bleeding, bruising, clotting or any changes to diet or medication. FU 1 weeks.  Virginia Mcarthur PharmD

## 2017-12-11 LAB — INR PPP: 2.4 (ref 2–3.5)

## 2017-12-12 ENCOUNTER — ANTICOAGULATION MONITORING (OUTPATIENT)
Dept: VASCULAR LAB | Facility: MEDICAL CENTER | Age: 77
End: 2017-12-12

## 2017-12-12 DIAGNOSIS — G45.0 VERTEBROBASILAR ARTERY SYNDROME: ICD-10-CM

## 2017-12-12 DIAGNOSIS — I48.0 PAROXYSMAL ATRIAL FIBRILLATION (HCC): ICD-10-CM

## 2017-12-12 NOTE — PROGRESS NOTES
Anticoagulation Summary  As of 12/12/2017    INR goal:   2.0-3.0   TTR:   27.6 % (3.4 mo)   Today's INR:   2.4 (12/11/2017)   Maintenance plan:   7.5 mg (5 mg x 1.5) on Sun, Thu; 5 mg (5 mg x 1) all other days   Weekly total:   40 mg   Plan last modified:   Virginia Mcarthur PharmD (12/5/2017)   Next INR check:   12/26/2017   Target end date:   Indefinite    Indications    TIA (transient ischemic attack) [G45.9]  Atrial fibrillation (CMS-HCC) [I48.91] [I48.91]             Anticoagulation Episode Summary     INR check location:   Outside Lab    Preferred lab:       Send INR reminders to:       Comments:   [facility]      Anticoagulation Care Providers     Provider Role Specialty Phone number    Darlene Moore M.D.  Cardiology [phone]        Anticoagulation Patient Findings    Left voicemail message to report a therapeutic INR of 2.4.  Pt to continue with current warfarin dosing regimen. Requested pt contact the clinic for any s/s of unusual bleeding, bruising, clotting or any changes to diet or medication. FU 2 weeks.  Virginia Mcarthur PharmD

## 2017-12-27 LAB — INR PPP: 2.6 (ref 2–3.5)

## 2017-12-28 ENCOUNTER — ANTICOAGULATION MONITORING (OUTPATIENT)
Dept: VASCULAR LAB | Facility: MEDICAL CENTER | Age: 77
End: 2017-12-28

## 2017-12-28 DIAGNOSIS — G45.0 VERTEBROBASILAR ARTERY SYNDROME: ICD-10-CM

## 2017-12-28 DIAGNOSIS — I48.0 PAROXYSMAL ATRIAL FIBRILLATION (HCC): ICD-10-CM

## 2017-12-29 NOTE — PROGRESS NOTES
Anticoagulation Summary  As of 12/28/2017    INR goal:   2.0-3.0   TTR:   37.4 % (3.9 mo)   Today's INR:   2.6 (12/27/2017)   Maintenance plan:   7.5 mg (5 mg x 1.5) on Sun, Thu; 5 mg (5 mg x 1) all other days   Weekly total:   40 mg   No change documented:   Bisi Blanton Med Ass't   Plan last modified:   Virginia Mcarthur PharmD (12/5/2017)   Next INR check:   1/10/2018   Target end date:   Indefinite    Indications    TIA (transient ischemic attack) [G45.9]  Atrial fibrillation (CMS-HCC) [I48.91] [I48.91]             Anticoagulation Episode Summary     INR check location:   Outside Lab    Preferred lab:       Send INR reminders to:       Comments:   [facility]      Anticoagulation Care Providers     Provider Role Specialty Phone number    Darlene Moore M.D.  Cardiology [phone]        Anticoagulation Patient Findings  Patient Findings     Negatives:   Signs/symptoms of thrombosis, Signs/symptoms of bleeding, Laboratory test error suspected, Change in health, Change in alcohol use, Change in activity, Upcoming invasive procedure, Emergency department visit, Upcoming dental procedure, Missed doses, Extra doses, Change in medications, Change in diet/appetite, Hospital admission, Bruising, Other complaints        Spoke with patient to report a therapeutic INR.  Pt instructed to continue with current warfarin dosing regimen. Pt denies any s/s of bleeding, bruising, clotting or any changes to diet or medication.  Will follow up in 2 weeks.    Bisi Blanton Med Ass't     I have reviewed and agree with the plan above on 01/02/2018      Virginia Mcarthur PharmD

## 2018-01-09 LAB — INR PPP: 2.3 (ref 2–3.5)

## 2018-01-10 ENCOUNTER — ANTICOAGULATION MONITORING (OUTPATIENT)
Dept: VASCULAR LAB | Facility: MEDICAL CENTER | Age: 78
End: 2018-01-10

## 2018-01-10 DIAGNOSIS — G45.0 VERTEBROBASILAR ARTERY SYNDROME: ICD-10-CM

## 2018-01-10 DIAGNOSIS — I48.0 PAROXYSMAL ATRIAL FIBRILLATION (HCC): ICD-10-CM

## 2018-01-10 NOTE — PROGRESS NOTES
Anticoagulation Summary  As of 1/10/2018    INR goal:   2.0-3.0   TTR:   43.6 % (4.4 mo)   Today's INR:   2.3 (1/9/2018)   Maintenance plan:   7.5 mg (5 mg x 1.5) on Sun, Thu; 5 mg (5 mg x 1) all other days   Weekly total:   40 mg   Plan last modified:   Virginia Mcarthur PharmD (12/5/2017)   Next INR check:   2/6/2018   Target end date:   Indefinite    Indications    TIA (transient ischemic attack) [G45.9]  Atrial fibrillation (CMS-HCC) [I48.91] [I48.91]             Anticoagulation Episode Summary     INR check location:   Outside Lab    Preferred lab:       Send INR reminders to:       Comments:   [facility]      Anticoagulation Care Providers     Provider Role Specialty Phone number    Darlene Moore M.D.  Cardiology [phone]        Anticoagulation Patient Findings    Left voicemail message to report a therapeutic INR of 2.3.  Pt to continue with current warfarin dosing regimen. Requested pt contact the clinic for any s/s of unusual bleeding, bruising, clotting or any changes to diet or medication. FU 4 weeks.  Virginia Mcarthur PharmD

## 2018-01-30 LAB — INR PPP: 1.9 (ref 2–3.5)

## 2018-01-31 ENCOUNTER — ANTICOAGULATION MONITORING (OUTPATIENT)
Dept: VASCULAR LAB | Facility: MEDICAL CENTER | Age: 78
End: 2018-01-31

## 2018-01-31 DIAGNOSIS — I48.91 ATRIAL FIBRILLATION, UNSPECIFIED TYPE (HCC): ICD-10-CM

## 2018-01-31 DIAGNOSIS — G45.9 TRANSIENT CEREBRAL ISCHEMIA, UNSPECIFIED TYPE: ICD-10-CM

## 2018-01-31 NOTE — PROGRESS NOTES
Anticoagulation Summary  As of 1/31/2018    INR goal:   2.0-3.0   TTR:   48.0 % (5.1 mo)   Today's INR:   1.9! (1/30/2018)   Maintenance plan:   7.5 mg (5 mg x 1.5) on Sun, Thu; 5 mg (5 mg x 1) all other days   Weekly total:   40 mg   Plan last modified:   Virginia Mcarthur PharmD (12/5/2017)   Next INR check:   2/14/2018   Target end date:   Indefinite    Indications    TIA (transient ischemic attack) [G45.9]  Atrial fibrillation (CMS-[facility]) [I48.91] [I48.91]             Anticoagulation Episode Summary     INR check location:   Outside Lab    Preferred lab:       Send INR reminders to:       Comments:   [facility]      Anticoagulation Care Providers     Provider Role Specialty Phone number    Darlene Moore M.D.  Cardiology [phone]        Anticoagulation Patient Findings    Left voicemail message to report a subtherapeutic INR of 1.9.  Requested pt contact the clinic for any s/s of unusual bleeding, bruising, clotting or any changes to diet or medication.   Instructed patient to bolus with 7.5mg X 1, then resume current warfarin regimen.  FU 2 weeks.  Tawanda Vivas, Darlene

## 2018-02-01 ENCOUNTER — ANTICOAGULATION MONITORING (OUTPATIENT)
Dept: VASCULAR LAB | Facility: MEDICAL CENTER | Age: 78
End: 2018-02-01

## 2018-02-01 NOTE — PROGRESS NOTES
Anticoagulation Summary  As of 2/1/2018    INR goal:   2.0-3.0   TTR:   48.0 % (5.1 mo)   Today's INR:   No new INR was available at the time of this encounter.   Maintenance plan:   7.5 mg (5 mg x 1.5) on Sun, Thu, Sat; 5 mg (5 mg x 1) all other days   Weekly total:   42.5 mg   Plan last modified:   Estee Mustafa PharmD (2/1/2018)   Next INR check:   2/16/2018   Target end date:   Indefinite    Indications    TIA (transient ischemic attack) [G45.9]  Atrial fibrillation (CMS-HCC) [I48.91] [I48.91]             Anticoagulation Episode Summary     INR check location:   Outside Lab    Preferred lab:       Send INR reminders to:       Comments:   [facility]      Anticoagulation Care Providers     Provider Role Specialty Phone number    Darlene Moore M.D.  Cardiology [phone]        Anticoagulation Patient Findings      Spoke with patient.  INR is SUB therapeutic.   Pt missed a dose a week or so ago, didn't get previous message  Pt denies any unusual s/s of bleeding, bruising, clotting or any changes to diet or medications. Denies any etoh, cranberries, supplements, or illness.   Pt verifies warfarin weekly dosing - she's been taking 5mg daily except 7.5mg on Thurs, Sat and Sun.     Will have pt take a boost dose of 7.5mg x1 tomorrow and then resume her normal warfarin dosing.     Repeat INR in 2 weeks.     Estee Mustafa PharmD

## 2018-02-06 DIAGNOSIS — G45.9 TRANSIENT CEREBRAL ISCHEMIA, UNSPECIFIED TYPE: ICD-10-CM

## 2018-02-06 DIAGNOSIS — I48.0 PAF (PAROXYSMAL ATRIAL FIBRILLATION) (HCC): ICD-10-CM

## 2018-02-06 RX ORDER — WARFARIN SODIUM 5 MG/1
5-7.5 TABLET ORAL DAILY
Qty: 135 TAB | Refills: 1 | Status: SHIPPED | OUTPATIENT
Start: 2018-02-06 | End: 2018-05-24

## 2018-02-07 DIAGNOSIS — G45.9 TRANSIENT CEREBRAL ISCHEMIA, UNSPECIFIED TYPE: ICD-10-CM

## 2018-02-07 DIAGNOSIS — I48.0 PAF (PAROXYSMAL ATRIAL FIBRILLATION) (HCC): ICD-10-CM

## 2018-02-07 RX ORDER — WARFARIN SODIUM 5 MG/1
5-7.5 TABLET ORAL DAILY
Qty: 135 TAB | Refills: 1 | OUTPATIENT
Start: 2018-02-07

## 2018-02-21 LAB — INR PPP: 2.2 (ref 2–3.5)

## 2018-02-22 ENCOUNTER — ANTICOAGULATION MONITORING (OUTPATIENT)
Dept: VASCULAR LAB | Facility: MEDICAL CENTER | Age: 78
End: 2018-02-22

## 2018-02-22 NOTE — PROGRESS NOTES
Anticoagulation Summary  As of 2/22/2018    INR goal:   2.0-3.0   TTR:   50.3 % (5.8 mo)   Today's INR:   2.2 (2/21/2018)   Maintenance plan:   7.5 mg (5 mg x 1.5) on Sun, Thu, Sat; 5 mg (5 mg x 1) all other days   Weekly total:   42.5 mg   Plan last modified:   Marin BurchD (2/1/2018)   Next INR check:   3/29/2018   Target end date:   Indefinite    Indications    TIA (transient ischemic attack) [G45.9]  Atrial fibrillation (CMS-HCC) [I48.91] [I48.91]             Anticoagulation Episode Summary     INR check location:   Outside Lab    Preferred lab:       Send INR reminders to:       Comments:   [facility]      Anticoagulation Care Providers     Provider Role Specialty Phone number    Marin MooreD       Narciso Contreras M.D.  Cardiology [phone]        Anticoagulation Patient Findings        Spoke to patient on the phone.   INR  therapeutic.   Denies signs/symptoms of bleeding and/or thrombosis.   Denies changes to diet or medications.   Follow up appointment in 5 week(s).    Continue weekly warfarin dose as noted      George Villar, PharmD

## 2018-02-24 DIAGNOSIS — E78.5 HYPERLIPIDEMIA, UNSPECIFIED HYPERLIPIDEMIA TYPE: ICD-10-CM

## 2018-02-26 RX ORDER — ATORVASTATIN CALCIUM 40 MG/1
TABLET, FILM COATED ORAL
Qty: 90 TAB | Refills: 2 | Status: SHIPPED | OUTPATIENT
Start: 2018-02-26 | End: 2019-02-20 | Stop reason: SDUPTHER

## 2018-03-27 ENCOUNTER — ANTICOAGULATION MONITORING (OUTPATIENT)
Dept: VASCULAR LAB | Facility: MEDICAL CENTER | Age: 78
End: 2018-03-27

## 2018-03-27 DIAGNOSIS — G45.9 TRANSIENT CEREBRAL ISCHEMIA, UNSPECIFIED TYPE: ICD-10-CM

## 2018-03-27 DIAGNOSIS — I48.91 ATRIAL FIBRILLATION, UNSPECIFIED TYPE (HCC): ICD-10-CM

## 2018-03-27 LAB — INR PPP: 2.7 (ref 2–3.5)

## 2018-03-27 NOTE — PROGRESS NOTES
OP Anticoagulation Service Note    Date: 3/27/2018  Anticoagulation Summary  As of 3/27/2018    INR goal:   2.0-3.0   TTR:   58.5 % (6.9 mo)   Today's INR:   2.7   Maintenance plan:   7.5 mg (5 mg x 1.5) on Sun, Thu, Sat; 5 mg (5 mg x 1) all other days   Weekly total:   42.5 mg   No change documented:   Axel Killian, Med Ass't   Plan last modified:   Estee Mustafa PharmD (2/1/2018)   Next INR check:   5/8/2018   Target end date:   Indefinite    Indications    TIA (transient ischemic attack) [G45.9]  Atrial fibrillation (CMS-HCC) [I48.91] [I48.91]             Anticoagulation Episode Summary     INR check location:   Outside Lab    Preferred lab:       Send INR reminders to:       Comments:   [facility]      Anticoagulation Care Providers     Provider Role Specialty Phone number    Darlene Moore M.D.  Cardiology [phone]        Anticoagulation Patient Findings  Patient Findings     Negatives:   Signs/symptoms of thrombosis, Signs/symptoms of bleeding, Laboratory test error suspected, Change in health, Change in alcohol use, Change in activity, Upcoming invasive procedure, Emergency department visit, Upcoming dental procedure, Missed doses, Extra doses, Change in medications, Change in diet/appetite, Hospital admission, Bruising, Other complaints        Plan: Left patient a message. Patient is therapeutic and will remain on the same dose. Patient was instructed to call back if needed to report any unusual bleeding or bruising or any changes to medication or diet. Patient is to be checked again in 6 weeks.    Axel Killian    I have reviewed and am in agreement with the above stated plan on 3-27-18.  Tawanda Vivas, MarinD

## 2018-03-28 ENCOUNTER — ANTICOAGULATION MONITORING (OUTPATIENT)
Dept: VASCULAR LAB | Facility: MEDICAL CENTER | Age: 78
End: 2018-03-28

## 2018-03-28 DIAGNOSIS — I48.0 PAROXYSMAL ATRIAL FIBRILLATION (HCC): ICD-10-CM

## 2018-03-28 DIAGNOSIS — G45.0 VERTEBROBASILAR ARTERY SYNDROME: ICD-10-CM

## 2018-04-26 ENCOUNTER — OFFICE VISIT (OUTPATIENT)
Dept: CARDIOLOGY | Facility: CLINIC | Age: 78
End: 2018-04-26
Payer: MEDICARE

## 2018-04-26 VITALS
WEIGHT: 210 LBS | SYSTOLIC BLOOD PRESSURE: 127 MMHG | HEIGHT: 64 IN | BODY MASS INDEX: 35.85 KG/M2 | OXYGEN SATURATION: 92 % | DIASTOLIC BLOOD PRESSURE: 64 MMHG | HEART RATE: 64 BPM

## 2018-04-26 DIAGNOSIS — Z79.01 CHRONIC ANTICOAGULATION: ICD-10-CM

## 2018-04-26 DIAGNOSIS — I48.0 PAF (PAROXYSMAL ATRIAL FIBRILLATION) (HCC): ICD-10-CM

## 2018-04-26 DIAGNOSIS — Z01.810 PREOP CARDIOVASCULAR EXAM: ICD-10-CM

## 2018-04-26 LAB
INR PPP: 3 (ref 2–3.5)
INR PPP: 3 (ref 2–3.5)

## 2018-04-26 PROCEDURE — 99213 OFFICE O/P EST LOW 20 MIN: CPT | Performed by: INTERNAL MEDICINE

## 2018-04-26 ASSESSMENT — ENCOUNTER SYMPTOMS
DEPRESSION: 0
FEVER: 0
HEADACHES: 0
EYE DISCHARGE: 0
HEARTBURN: 0
NAUSEA: 0
CHILLS: 0
NERVOUS/ANXIOUS: 0
PALPITATIONS: 0
BLURRED VISION: 0
PND: 0
DIZZINESS: 1
SHORTNESS OF BREATH: 0
MYALGIAS: 0
BRUISES/BLEEDS EASILY: 0
COUGH: 0

## 2018-04-26 NOTE — LETTER
[facility] [address] [place] [address]  Phone: [phone]  Fax: [phone]              Laurie Adams  1940    Encounter Date: 4/26/2018    Narciso Contreras M.D.          PROGRESS NOTE:  Chief Complaint   Patient presents with   • Atrial Fibrillation     here for Sx clearance By Dr. Eric Garcia (Fax) clearance to [phone]       Subjective:   Laurie Adams is a 77 y.o. female who presents today FOR PREOP CLEARANCE FOR RIGHT KNEE REPLACEMENT.  NO PALPS.  TOLERATING COUMADIN WELL  NO HX OF CVA    Past Medical History:   Diagnosis Date   • Arrhythmia    • Arthritis     fingers   • Breath shortness    • Bronchial asthma 6/29/2012   • Bronchitis    • Cancer (CMS-HCC) 2005    squamous cell skin left chest   • Cerebral atherosclerosis    • Dependence on supplemental oxygen     2 liters at night.   • Dizziness and giddiness     Episodic since ~2009   • Dyslipidemia, goal LDL below 100 6/29/2012   • History of tobacco use    • HTN (hypertension) 6/29/2012   • Obesity 6/29/2012   • Pneumonia    • TIA (transient ischemic attack) 6/29/2012     Past Surgical History:   Procedure Laterality Date   • CATARACT PHACO WITH IOL  6/4/2013    Performed by Noe Jean M.D. at SURGERY [facility] ORS   • CATARACT PHACO WITH IOL  5/21/2013    Performed by Noe Jean M.D. at SURGERY [facility] ORS   • SEPTOPLASTY  8/19/2009    Performed by PABLITO LORENZANA at SURGERY [facility] ORS   • SOMNOPLASTY  8/19/2009    Performed by PABLITO LORENZANA at SURGERY [facility] ORS   • ANTROSTOMY  8/19/2009    Performed by PABLITO LORENZANA at SURGERY [facility] ORS   • ETHMOIDECTOMY  8/19/2009    Performed by PABLITO LORENZANA at SURGERY [facility] ORS   • OTHER  2008    sinus   • COLON RESECTION  2007   • CHOLECYSTECTOMY  1988   • HYSTERECTOMY RADICAL  1964   • APPENDECTOMY  1954   • OTHER  1943    mastoid   • HYSTERECTOMY,  VAGINAL     • MASTOIDECTOMY     • PB CHOLECYSTOENTEROSTOMY+VIVIANA-EN-Y     • PB NASAL SCOPY,REPB CSF LEAK,SPHENOID     • WRIST FUSION       History reviewed. No pertinent family history.  Social History     Social History   • Marital status:      Spouse name: N/A   • Number of children: N/A   • Years of education: N/A     Occupational History   • Not on file.     Social History Main Topics   • Smoking status: Former Smoker     Packs/day: 1.00     Years: 40.00     Types: Cigarettes     Quit date: 8/14/1999   • Smokeless tobacco: Never Used   • Alcohol use No   • Drug use: No   • Sexual activity: Yes     Partners: Male     Other Topics Concern   • Not on file     Social History Narrative   • No narrative on file     Allergies   Allergen Reactions   • Feldene [Piroxicam] Photosensitivity   • Sulfa Drugs Hives   • Codeine Anxiety     Outpatient Encounter Prescriptions as of 4/26/2018   Medication Sig Dispense Refill   • atorvastatin (LIPITOR) 40 MG Tab TAKE ONE TABLET BY MOUTH ONCE DAILY 90 Tab 2   • warfarin (COUMADIN) 5 MG Tab Take 1-1.5 Tabs by mouth every day. 135 Tab 1   • flecainide (TAMBOCOR) 100 MG Tab TAKE ONE TABLET BY MOUTH TWICE DAILY 60 Tab 7   • metoprolol SR (TOPROL XL) 100 MG TABLET SR 24 HR Take 1 Tab by mouth every day. 30 Tab 1   • levalbuterol (XOPENEX HFA) 45 MCG/ACT inhaler Inhale 1-2 Puffs by mouth. Pt states 1-2 puffs as needed      • NON SPECIFIED Take  by mouth every day. OTC 24 hour allergy medication     • SERTRALINE 100 MG TABS Take 100 mg by mouth every day.     • VITAMIN D3 Take  by mouth every day.     • [DISCONTINUED] enoxaparin (LOVENOX) 100 MG/ML Solution inj Inject 100 mg as instructed every 12 hours. 10 Syringe 1   • [DISCONTINUED] Multiple Vitamins-Minerals (PRESERVISION AREDS 2) Cap Take 1 tablet by mouth every day. 1 Cap 0   • [DISCONTINUED] fluticasone-salmeterol (ADVAIR) 250-50 MCG/DOSE AEPB Inhale 1 Puff by mouth every 12 hours.       No facility-administered encounter  "medications on file as of 4/26/2018.      Review of Systems   Constitutional: Negative for chills, fever and malaise/fatigue.   Eyes: Negative for blurred vision and discharge.   Respiratory: Negative for cough and shortness of breath.    Cardiovascular: Negative for chest pain, palpitations, leg swelling and PND.   Gastrointestinal: Negative for heartburn and nausea.   Genitourinary: Negative for dysuria and urgency.   Musculoskeletal: Positive for joint pain. Negative for myalgias.   Skin: Negative for itching and rash.   Neurological: Positive for dizziness. Negative for headaches.   Endo/Heme/Allergies: Negative for environmental allergies. Does not bruise/bleed easily.   Psychiatric/Behavioral: Negative for depression. The patient is not nervous/anxious.         Objective:   /64   Pulse 64   Ht 1.626 m (5' 4\")   Wt 95.3 kg (210 lb)   SpO2 92%   BMI 36.05 kg/m²      Physical Exam   Constitutional: She is oriented to person, place, and time.   Neck: No JVD present.   Cardiovascular: Normal rate and regular rhythm.  Exam reveals no gallop and no friction rub.    No murmur heard.  Pulmonary/Chest: Effort normal and breath sounds normal.   Abdominal: Soft. Bowel sounds are normal.   Musculoskeletal: She exhibits no edema.   Neurological: She is alert and oriented to person, place, and time.   Skin: Skin is warm and dry.       Assessment:     1. Preop cardiovascular exam     2. PAF (paroxysmal atrial fibrillation) (CMS-HCC)     3. Chronic anticoagulation         Medical Decision Making:  Today's Assessment / Status / Plan:   LOW RISK FO SURGERY   OK TO DC COUMADIN 5-6 DAYS PREOP.  NO NEED FOR BRIDGING  CONTINUE FLECAINIDE       Олег Colin M.D.  [address] [facility] [address]  VIA Facsimile: [phone]     Eric Bunn M.D.  [address] [facility] [address]  VIA Mail                 "

## 2018-04-26 NOTE — PROGRESS NOTES
Chief Complaint   Patient presents with   • Atrial Fibrillation     here for Sx clearance By Dr. Eric Garcia (Fax) clearance to [phone]       Subjective:   Laurie Adams is a 77 y.o. female who presents today FOR PREOP CLEARANCE FOR RIGHT KNEE REPLACEMENT.  NO PALPS.  TOLERATING COUMADIN WELL  NO HX OF CVA    Past Medical History:   Diagnosis Date   • Arrhythmia    • Arthritis     fingers   • Breath shortness    • Bronchial asthma 6/29/2012   • Bronchitis    • Cancer (CMS-HCC) 2005    squamous cell skin left chest   • Cerebral atherosclerosis    • Dependence on supplemental oxygen     2 liters at night.   • Dizziness and giddiness     Episodic since ~2009   • Dyslipidemia, goal LDL below 100 6/29/2012   • History of tobacco use    • HTN (hypertension) 6/29/2012   • Obesity 6/29/2012   • Pneumonia    • TIA (transient ischemic attack) 6/29/2012     Past Surgical History:   Procedure Laterality Date   • CATARACT PHACO WITH IOL  6/4/2013    Performed by Noe Jean M.D. at SURGERY [facility] ORS   • CATARACT PHACO WITH IOL  5/21/2013    Performed by Noe Jean M.D. at SURGERY [facility] ORS   • SEPTOPLASTY  8/19/2009    Performed by PABLITO LORENZANA at SURGERY [facility] ORS   • SOMNOPLASTY  8/19/2009    Performed by PABLITO LORENZANA at SURGERY [facility] ORS   • ANTROSTOMY  8/19/2009    Performed by PABLITO LORENZANA at SURGERY [facility] ORS   • ETHMOIDECTOMY  8/19/2009    Performed by PABLITO LORENZANA at SURGERY [facility] ORS   • OTHER  2008    sinus   • COLON RESECTION  2007   • CHOLECYSTECTOMY  1988   • HYSTERECTOMY RADICAL  1964   • APPENDECTOMY  1954   • OTHER  1943    mastoid   • HYSTERECTOMY, VAGINAL     • MASTOIDECTOMY     • PB CHOLECYSTOENTEROSTOMY+VIVIANA-EN-Y     • PB NASAL SCOPY,REPB CSF LEAK,SPHENOID     • WRIST FUSION       History reviewed. No pertinent family history.  Social History     Social History   • Marital status:      Spouse name: N/A   • Number  of children: N/A   • Years of education: N/A     Occupational History   • Not on file.     Social History Main Topics   • Smoking status: Former Smoker     Packs/day: 1.00     Years: 40.00     Types: Cigarettes     Quit date: 8/14/1999   • Smokeless tobacco: Never Used   • Alcohol use No   • Drug use: No   • Sexual activity: Yes     Partners: Male     Other Topics Concern   • Not on file     Social History Narrative   • No narrative on file     Allergies   Allergen Reactions   • Feldene [Piroxicam] Photosensitivity   • Sulfa Drugs Hives   • Codeine Anxiety     Outpatient Encounter Prescriptions as of 4/26/2018   Medication Sig Dispense Refill   • atorvastatin (LIPITOR) 40 MG Tab TAKE ONE TABLET BY MOUTH ONCE DAILY 90 Tab 2   • warfarin (COUMADIN) 5 MG Tab Take 1-1.5 Tabs by mouth every day. 135 Tab 1   • flecainide (TAMBOCOR) 100 MG Tab TAKE ONE TABLET BY MOUTH TWICE DAILY 60 Tab 7   • metoprolol SR (TOPROL XL) 100 MG TABLET SR 24 HR Take 1 Tab by mouth every day. 30 Tab 1   • levalbuterol (XOPENEX HFA) 45 MCG/ACT inhaler Inhale 1-2 Puffs by mouth. Pt states 1-2 puffs as needed      • NON SPECIFIED Take  by mouth every day. OTC 24 hour allergy medication     • SERTRALINE 100 MG TABS Take 100 mg by mouth every day.     • VITAMIN D3 Take  by mouth every day.     • [DISCONTINUED] enoxaparin (LOVENOX) 100 MG/ML Solution inj Inject 100 mg as instructed every 12 hours. 10 Syringe 1   • [DISCONTINUED] Multiple Vitamins-Minerals (PRESERVISION AREDS 2) Cap Take 1 tablet by mouth every day. 1 Cap 0   • [DISCONTINUED] fluticasone-salmeterol (ADVAIR) 250-50 MCG/DOSE AEPB Inhale 1 Puff by mouth every 12 hours.       No facility-administered encounter medications on file as of 4/26/2018.      Review of Systems   Constitutional: Negative for chills, fever and malaise/fatigue.   Eyes: Negative for blurred vision and discharge.   Respiratory: Negative for cough and shortness of breath.    Cardiovascular: Negative for chest pain,  "palpitations, leg swelling and PND.   Gastrointestinal: Negative for heartburn and nausea.   Genitourinary: Negative for dysuria and urgency.   Musculoskeletal: Positive for joint pain. Negative for myalgias.   Skin: Negative for itching and rash.   Neurological: Positive for dizziness. Negative for headaches.   Endo/Heme/Allergies: Negative for environmental allergies. Does not bruise/bleed easily.   Psychiatric/Behavioral: Negative for depression. The patient is not nervous/anxious.         Objective:   /64   Pulse 64   Ht 1.626 m (5' 4\")   Wt 95.3 kg (210 lb)   SpO2 92%   BMI 36.05 kg/m²     Physical Exam   Constitutional: She is oriented to person, place, and time.   Neck: No JVD present.   Cardiovascular: Normal rate and regular rhythm.  Exam reveals no gallop and no friction rub.    No murmur heard.  Pulmonary/Chest: Effort normal and breath sounds normal.   Abdominal: Soft. Bowel sounds are normal.   Musculoskeletal: She exhibits no edema.   Neurological: She is alert and oriented to person, place, and time.   Skin: Skin is warm and dry.       Assessment:     1. Preop cardiovascular exam     2. PAF (paroxysmal atrial fibrillation) (CMS-HCC)     3. Chronic anticoagulation         Medical Decision Making:  Today's Assessment / Status / Plan:   LOW RISK FO SURGERY   OK TO DC COUMADIN 5-6 DAYS PREOP.  NO NEED FOR BRIDGING  CONTINUE FLECAINIDE   "

## 2018-04-27 ENCOUNTER — ANTICOAGULATION MONITORING (OUTPATIENT)
Dept: VASCULAR LAB | Facility: MEDICAL CENTER | Age: 78
End: 2018-04-27

## 2018-04-27 NOTE — PROGRESS NOTES
OP Anticoagulation Telephone Note    Date: 4/27/2018  Anticoagulation Summary  As of 4/27/2018    INR goal:   2.0-3.0   TTR:   63.7 % (7.9 mo)   Today's INR:   3.0 (4/26/2018)   Warfarin maintenance plan:   7.5 mg (5 mg x 1.5) on Sun, Thu, Sat; 5 mg (5 mg x 1) all other days   Weekly warfarin total:   42.5 mg   No change documented:   Helena Torres, Med Ass't   Plan last modified:   Estee Mustafa, PharmD (2/1/2018)   Next INR check:   6/8/2018   Target end date:   Indefinite    Indications    TIA (transient ischemic attack) [G45.9]  Atrial fibrillation (CMS-HCC) [I48.91] [I48.91]             Anticoagulation Episode Summary     INR check location:   Outside Lab    Preferred lab:       Send INR reminders to:       Comments:   B[facility] Ballard      Anticoagulation Care Providers     Provider Role Specialty Phone number    Darlene Moore M.D.  Cardiology [phone]        Anticoagulation Patient Findings  Patient Findings     Negatives:   Signs/symptoms of thrombosis, Signs/symptoms of bleeding, Laboratory test error suspected, Change in health, Change in alcohol use, Change in activity, Upcoming invasive procedure, Emergency department visit, Upcoming dental procedure, Missed doses, Extra doses, Change in medications, Change in diet/appetite, Hospital admission, Bruising, Other complaints      Plan:  Spoke with patient on the phone. Patient is therapeutic today. Patient denies any changes in medications or diet. Patient denies any signs or symptoms of bleeding or clotting. Instructed patient to call clinic if any unusual bleeding or bruising occurs. Will continue dosing as outlined above. Will follow-up with patient in 6 weeks.    Helena Torres, Medical Assistant      I have reviewed and concur with the above plan     George Villar, MarinD

## 2018-05-22 ENCOUNTER — HOSPITAL ENCOUNTER (OUTPATIENT)
Facility: MEDICAL CENTER | Age: 78
End: 2018-05-22
Attending: ORTHOPAEDIC SURGERY | Admitting: ORTHOPAEDIC SURGERY
Payer: MEDICARE

## 2018-05-24 VITALS — BODY MASS INDEX: 36.46 KG/M2 | HEIGHT: 64 IN | WEIGHT: 213.54 LBS

## 2018-05-24 DIAGNOSIS — Z01.812 PRE-OPERATIVE LABORATORY EXAMINATION: ICD-10-CM

## 2018-05-24 DIAGNOSIS — Z01.810 PRE-OPERATIVE CARDIOVASCULAR EXAMINATION: ICD-10-CM

## 2018-05-24 LAB
ANION GAP SERPL CALC-SCNC: 6 MMOL/L (ref 0–11.9)
BUN SERPL-MCNC: 33 MG/DL (ref 8–22)
CALCIUM SERPL-MCNC: 9 MG/DL (ref 8.5–10.5)
CHLORIDE SERPL-SCNC: 105 MMOL/L (ref 96–112)
CO2 SERPL-SCNC: 31 MMOL/L (ref 20–33)
CREAT SERPL-MCNC: 1.28 MG/DL (ref 0.5–1.4)
EKG IMPRESSION: NORMAL
ERYTHROCYTE [DISTWIDTH] IN BLOOD BY AUTOMATED COUNT: 44 FL (ref 35.9–50)
GLUCOSE SERPL-MCNC: 124 MG/DL (ref 65–99)
HCT VFR BLD AUTO: 47.9 % (ref 37–47)
HGB BLD-MCNC: 15.4 G/DL (ref 12–16)
MCH RBC QN AUTO: 30 PG (ref 27–33)
MCHC RBC AUTO-ENTMCNC: 32.2 G/DL (ref 33.6–35)
MCV RBC AUTO: 93.4 FL (ref 81.4–97.8)
PLATELET # BLD AUTO: 263 K/UL (ref 164–446)
PMV BLD AUTO: 11.5 FL (ref 9–12.9)
POTASSIUM SERPL-SCNC: 3.9 MMOL/L (ref 3.6–5.5)
RBC # BLD AUTO: 5.13 M/UL (ref 4.2–5.4)
SCCMEC + MECA PNL NOSE NAA+PROBE: NEGATIVE
SCCMEC + MECA PNL NOSE NAA+PROBE: NEGATIVE
SODIUM SERPL-SCNC: 142 MMOL/L (ref 135–145)
WBC # BLD AUTO: 10.4 K/UL (ref 4.8–10.8)

## 2018-05-24 PROCEDURE — 36415 COLL VENOUS BLD VENIPUNCTURE: CPT

## 2018-05-24 PROCEDURE — 87641 MR-STAPH DNA AMP PROBE: CPT

## 2018-05-24 PROCEDURE — 93010 ELECTROCARDIOGRAM REPORT: CPT | Performed by: INTERNAL MEDICINE

## 2018-05-24 PROCEDURE — 85027 COMPLETE CBC AUTOMATED: CPT

## 2018-05-24 PROCEDURE — 87640 STAPH A DNA AMP PROBE: CPT

## 2018-05-24 PROCEDURE — 93005 ELECTROCARDIOGRAM TRACING: CPT

## 2018-05-24 PROCEDURE — 80048 BASIC METABOLIC PNL TOTAL CA: CPT

## 2018-05-24 RX ORDER — LORATADINE 10 MG/1
10 TABLET ORAL DAILY
COMMUNITY
End: 2021-05-04

## 2018-05-24 RX ORDER — WARFARIN SODIUM 5 MG/1
5 TABLET ORAL DAILY
COMMUNITY
End: 2019-03-26

## 2018-05-24 NOTE — DISCHARGE PLANNING
DISCHARGE PLANNING NOTE - TOTAL JOINT     Procedure: Procedure(s):  KNEE ARTHROPLASTY TOTAL  Procedure Date: 6/4/2018  Insurance:  Payor: MEDICARE / Plan: MEDICARE PART A & B / Product Type: *No Product type* /   Equipment currently available at home? front-wheel walker and shower chair  Steps into the home? 0  Steps within the home? 0  Toilet height? Standard  Type of shower? tub-shower  Who will be with you during your recovery? spouse  Is Outpatient Physical Therapy set up after surgery? Yes   Did you take the Total Joint Class and where? Yes, at [facility]     Plan:

## 2018-06-03 NOTE — PROGRESS NOTES
"Phone call:  Patient called to cancel procedure. She has a \"really bad cold\" and feels that she needs to postpone. She also called the surgeons office and left a message with the MA.   "

## 2018-06-07 ENCOUNTER — APPOINTMENT (OUTPATIENT)
Dept: RADIOLOGY | Facility: MEDICAL CENTER | Age: 78
DRG: 470 | End: 2018-06-07
Attending: ORTHOPAEDIC SURGERY
Payer: MEDICARE

## 2018-06-07 ENCOUNTER — HOSPITAL ENCOUNTER (INPATIENT)
Facility: MEDICAL CENTER | Age: 78
LOS: 2 days | DRG: 470 | End: 2018-06-09
Attending: ORTHOPAEDIC SURGERY | Admitting: ORTHOPAEDIC SURGERY
Payer: MEDICARE

## 2018-06-07 DIAGNOSIS — M17.11 PRIMARY OSTEOARTHRITIS OF RIGHT KNEE: ICD-10-CM

## 2018-06-07 DIAGNOSIS — G89.18 ACUTE POST-OPERATIVE PAIN: ICD-10-CM

## 2018-06-07 LAB
APTT PPP: 23 SEC (ref 24.7–36)
INR PPP: 0.97 (ref 0.87–1.13)
PROTHROMBIN TIME: 12.8 SEC (ref 12–14.6)

## 2018-06-07 PROCEDURE — 160041 HCHG SURGERY MINUTES - EA ADDL 1 MIN LEVEL 4: Performed by: ORTHOPAEDIC SURGERY

## 2018-06-07 PROCEDURE — 94760 N-INVAS EAR/PLS OXIMETRY 1: CPT

## 2018-06-07 PROCEDURE — 160035 HCHG PACU - 1ST 60 MINS PHASE I: Performed by: ORTHOPAEDIC SURGERY

## 2018-06-07 PROCEDURE — A9270 NON-COVERED ITEM OR SERVICE: HCPCS

## 2018-06-07 PROCEDURE — 770006 HCHG ROOM/CARE - MED/SURG/GYN SEMI*

## 2018-06-07 PROCEDURE — 160009 HCHG ANES TIME/MIN: Performed by: ORTHOPAEDIC SURGERY

## 2018-06-07 PROCEDURE — 700102 HCHG RX REV CODE 250 W/ 637 OVERRIDE(OP): Performed by: NURSE PRACTITIONER

## 2018-06-07 PROCEDURE — 700101 HCHG RX REV CODE 250

## 2018-06-07 PROCEDURE — 160029 HCHG SURGERY MINUTES - 1ST 30 MINS LEVEL 4: Performed by: ORTHOPAEDIC SURGERY

## 2018-06-07 PROCEDURE — 501838 HCHG SUTURE GENERAL: Performed by: ORTHOPAEDIC SURGERY

## 2018-06-07 PROCEDURE — 85730 THROMBOPLASTIN TIME PARTIAL: CPT

## 2018-06-07 PROCEDURE — 160048 HCHG OR STATISTICAL LEVEL 1-5: Performed by: ORTHOPAEDIC SURGERY

## 2018-06-07 PROCEDURE — 700102 HCHG RX REV CODE 250 W/ 637 OVERRIDE(OP)

## 2018-06-07 PROCEDURE — 700101 HCHG RX REV CODE 250: Performed by: ORTHOPAEDIC SURGERY

## 2018-06-07 PROCEDURE — 94640 AIRWAY INHALATION TREATMENT: CPT

## 2018-06-07 PROCEDURE — A9270 NON-COVERED ITEM OR SERVICE: HCPCS | Performed by: NURSE PRACTITIONER

## 2018-06-07 PROCEDURE — 700111 HCHG RX REV CODE 636 W/ 250 OVERRIDE (IP)

## 2018-06-07 PROCEDURE — A9270 NON-COVERED ITEM OR SERVICE: HCPCS | Performed by: ORTHOPAEDIC SURGERY

## 2018-06-07 PROCEDURE — 160036 HCHG PACU - EA ADDL 30 MINS PHASE I: Performed by: ORTHOPAEDIC SURGERY

## 2018-06-07 PROCEDURE — 700112 HCHG RX REV CODE 229: Performed by: ORTHOPAEDIC SURGERY

## 2018-06-07 PROCEDURE — 3E0T3BZ INTRODUCTION OF ANESTHETIC AGENT INTO PERIPHERAL NERVES AND PLEXI, PERCUTANEOUS APPROACH: ICD-10-PCS | Performed by: ORTHOPAEDIC SURGERY

## 2018-06-07 PROCEDURE — 73560 X-RAY EXAM OF KNEE 1 OR 2: CPT | Mod: RT

## 2018-06-07 PROCEDURE — 85610 PROTHROMBIN TIME: CPT

## 2018-06-07 PROCEDURE — 700105 HCHG RX REV CODE 258: Performed by: ORTHOPAEDIC SURGERY

## 2018-06-07 PROCEDURE — 700111 HCHG RX REV CODE 636 W/ 250 OVERRIDE (IP): Performed by: NURSE PRACTITIONER

## 2018-06-07 PROCEDURE — 36415 COLL VENOUS BLD VENIPUNCTURE: CPT

## 2018-06-07 PROCEDURE — 0SRC0J9 REPLACEMENT OF RIGHT KNEE JOINT WITH SYNTHETIC SUBSTITUTE, CEMENTED, OPEN APPROACH: ICD-10-PCS | Performed by: ORTHOPAEDIC SURGERY

## 2018-06-07 PROCEDURE — 700111 HCHG RX REV CODE 636 W/ 250 OVERRIDE (IP): Performed by: ORTHOPAEDIC SURGERY

## 2018-06-07 PROCEDURE — 160002 HCHG RECOVERY MINUTES (STAT): Performed by: ORTHOPAEDIC SURGERY

## 2018-06-07 PROCEDURE — 502579 HCHG PACK, TOTAL KNEE: Performed by: ORTHOPAEDIC SURGERY

## 2018-06-07 PROCEDURE — 502000 HCHG MISC OR IMPLANTS RC 0278: Performed by: ORTHOPAEDIC SURGERY

## 2018-06-07 DEVICE — IMPLANT GII CM TIB SIZE 3 RIGHT (1EA): Type: IMPLANTABLE DEVICE | Site: KNEE | Status: FUNCTIONAL

## 2018-06-07 DEVICE — IMPLANT LGN PS HIGH FLEX XLPE SZ 3-4 11MM: Type: IMPLANTABLE DEVICE | Site: KNEE | Status: FUNCTIONAL

## 2018-06-07 DEVICE — IMPLANTABLE DEVICE: Type: IMPLANTABLE DEVICE | Site: KNEE | Status: FUNCTIONAL

## 2018-06-07 DEVICE — PATELLA GNS II RESURF  29MM: Type: IMPLANTABLE DEVICE | Site: KNEE | Status: FUNCTIONAL

## 2018-06-07 RX ORDER — CELECOXIB 200 MG/1
CAPSULE ORAL
Status: COMPLETED
Start: 2018-06-07 | End: 2018-06-07

## 2018-06-07 RX ORDER — OXYCODONE HYDROCHLORIDE 10 MG/1
10 TABLET ORAL EVERY 4 HOURS PRN
Status: DISCONTINUED | OUTPATIENT
Start: 2018-06-07 | End: 2018-06-09 | Stop reason: HOSPADM

## 2018-06-07 RX ORDER — LEVALBUTEROL TARTRATE 45 UG/1
1-2 AEROSOL, METERED ORAL EVERY 4 HOURS PRN
Status: DISCONTINUED | OUTPATIENT
Start: 2018-06-07 | End: 2018-06-07

## 2018-06-07 RX ORDER — ONDANSETRON 2 MG/ML
4 INJECTION INTRAMUSCULAR; INTRAVENOUS EVERY 4 HOURS PRN
Status: DISCONTINUED | OUTPATIENT
Start: 2018-06-07 | End: 2018-06-09 | Stop reason: HOSPADM

## 2018-06-07 RX ORDER — METOPROLOL SUCCINATE 100 MG/1
100 TABLET, EXTENDED RELEASE ORAL DAILY
Status: DISCONTINUED | OUTPATIENT
Start: 2018-06-08 | End: 2018-06-09 | Stop reason: HOSPADM

## 2018-06-07 RX ORDER — MORPHINE SULFATE 10 MG/ML
1-3 INJECTION, SOLUTION INTRAMUSCULAR; INTRAVENOUS
Status: DISCONTINUED | OUTPATIENT
Start: 2018-06-07 | End: 2018-06-09 | Stop reason: HOSPADM

## 2018-06-07 RX ORDER — LEVALBUTEROL INHALATION SOLUTION 1.25 MG/3ML
1.25 SOLUTION RESPIRATORY (INHALATION)
Status: DISCONTINUED | OUTPATIENT
Start: 2018-06-07 | End: 2018-06-09 | Stop reason: HOSPADM

## 2018-06-07 RX ORDER — TRANEXAMIC ACID 100 MG/ML
INJECTION, SOLUTION INTRAVENOUS
Status: DISCONTINUED | OUTPATIENT
Start: 2018-06-07 | End: 2018-06-07 | Stop reason: HOSPADM

## 2018-06-07 RX ORDER — ACETAMINOPHEN 500 MG
TABLET ORAL
Status: COMPLETED
Start: 2018-06-07 | End: 2018-06-07

## 2018-06-07 RX ORDER — WARFARIN SODIUM 5 MG/1
10 TABLET ORAL
Status: COMPLETED | OUTPATIENT
Start: 2018-06-07 | End: 2018-06-07

## 2018-06-07 RX ORDER — LORATADINE 10 MG/1
10 TABLET ORAL DAILY
Status: DISCONTINUED | OUTPATIENT
Start: 2018-06-07 | End: 2018-06-09 | Stop reason: HOSPADM

## 2018-06-07 RX ORDER — FLECAINIDE ACETATE 100 MG/1
100 TABLET ORAL TWICE DAILY
Status: DISCONTINUED | OUTPATIENT
Start: 2018-06-07 | End: 2018-06-09 | Stop reason: HOSPADM

## 2018-06-07 RX ORDER — WARFARIN SODIUM 7.5 MG/1
7.5 TABLET ORAL
Status: COMPLETED | OUTPATIENT
Start: 2018-06-08 | End: 2018-06-08

## 2018-06-07 RX ORDER — DIPHENHYDRAMINE HYDROCHLORIDE 50 MG/ML
25 INJECTION INTRAMUSCULAR; INTRAVENOUS EVERY 6 HOURS PRN
Status: DISCONTINUED | OUTPATIENT
Start: 2018-06-07 | End: 2018-06-09 | Stop reason: HOSPADM

## 2018-06-07 RX ORDER — TRAMADOL HYDROCHLORIDE 50 MG/1
50-100 TABLET ORAL EVERY 6 HOURS PRN
Status: DISCONTINUED | OUTPATIENT
Start: 2018-06-07 | End: 2018-06-09 | Stop reason: HOSPADM

## 2018-06-07 RX ORDER — HYDROCODONE BITARTRATE AND ACETAMINOPHEN 10; 325 MG/1; MG/1
1-2 TABLET ORAL EVERY 4 HOURS PRN
Status: DISCONTINUED | OUTPATIENT
Start: 2018-06-07 | End: 2018-06-09 | Stop reason: HOSPADM

## 2018-06-07 RX ORDER — ENEMA 19; 7 G/133ML; G/133ML
1 ENEMA RECTAL
Status: DISCONTINUED | OUTPATIENT
Start: 2018-06-07 | End: 2018-06-09 | Stop reason: HOSPADM

## 2018-06-07 RX ORDER — SCOLOPAMINE TRANSDERMAL SYSTEM 1 MG/1
1 PATCH, EXTENDED RELEASE TRANSDERMAL
Status: DISCONTINUED | OUTPATIENT
Start: 2018-06-07 | End: 2018-06-09 | Stop reason: HOSPADM

## 2018-06-07 RX ORDER — SODIUM CHLORIDE, SODIUM LACTATE, POTASSIUM CHLORIDE, CALCIUM CHLORIDE 600; 310; 30; 20 MG/100ML; MG/100ML; MG/100ML; MG/100ML
1000 INJECTION, SOLUTION INTRAVENOUS
Status: DISCONTINUED | OUTPATIENT
Start: 2018-06-07 | End: 2018-06-09 | Stop reason: HOSPADM

## 2018-06-07 RX ORDER — DOCUSATE SODIUM 100 MG/1
100 CAPSULE, LIQUID FILLED ORAL 2 TIMES DAILY
Status: DISCONTINUED | OUTPATIENT
Start: 2018-06-07 | End: 2018-06-09 | Stop reason: HOSPADM

## 2018-06-07 RX ORDER — GINSENG 100 MG
CAPSULE ORAL
Status: DISCONTINUED | OUTPATIENT
Start: 2018-06-07 | End: 2018-06-07 | Stop reason: HOSPADM

## 2018-06-07 RX ORDER — BISACODYL 10 MG
10 SUPPOSITORY, RECTAL RECTAL
Status: DISCONTINUED | OUTPATIENT
Start: 2018-06-07 | End: 2018-06-09 | Stop reason: HOSPADM

## 2018-06-07 RX ORDER — DOCUSATE SODIUM 100 MG/1
100 CAPSULE, LIQUID FILLED ORAL 2 TIMES DAILY
Status: DISCONTINUED | OUTPATIENT
Start: 2018-06-07 | End: 2018-06-07

## 2018-06-07 RX ORDER — OXYCODONE HCL 10 MG/1
TABLET, FILM COATED, EXTENDED RELEASE ORAL
Status: COMPLETED
Start: 2018-06-07 | End: 2018-06-07

## 2018-06-07 RX ORDER — AMOXICILLIN 250 MG
1 CAPSULE ORAL
Status: DISCONTINUED | OUTPATIENT
Start: 2018-06-07 | End: 2018-06-09 | Stop reason: HOSPADM

## 2018-06-07 RX ORDER — AMOXICILLIN 250 MG
1 CAPSULE ORAL NIGHTLY
Status: DISCONTINUED | OUTPATIENT
Start: 2018-06-07 | End: 2018-06-09 | Stop reason: HOSPADM

## 2018-06-07 RX ORDER — ATORVASTATIN CALCIUM 40 MG/1
40 TABLET, FILM COATED ORAL
Status: DISCONTINUED | OUTPATIENT
Start: 2018-06-07 | End: 2018-06-09 | Stop reason: HOSPADM

## 2018-06-07 RX ORDER — DEXAMETHASONE SODIUM PHOSPHATE 4 MG/ML
10 INJECTION, SOLUTION INTRA-ARTICULAR; INTRALESIONAL; INTRAMUSCULAR; INTRAVENOUS; SOFT TISSUE ONCE
Status: COMPLETED | OUTPATIENT
Start: 2018-06-08 | End: 2018-06-08

## 2018-06-07 RX ORDER — BUPIVACAINE HYDROCHLORIDE AND EPINEPHRINE 5; 5 MG/ML; UG/ML
INJECTION, SOLUTION EPIDURAL; INTRACAUDAL; PERINEURAL
Status: DISCONTINUED | OUTPATIENT
Start: 2018-06-07 | End: 2018-06-07 | Stop reason: HOSPADM

## 2018-06-07 RX ORDER — DEXAMETHASONE SODIUM PHOSPHATE 4 MG/ML
4 INJECTION, SOLUTION INTRA-ARTICULAR; INTRALESIONAL; INTRAMUSCULAR; INTRAVENOUS; SOFT TISSUE
Status: COMPLETED | OUTPATIENT
Start: 2018-06-07 | End: 2018-06-07

## 2018-06-07 RX ORDER — POLYETHYLENE GLYCOL 3350 17 G/17G
1 POWDER, FOR SOLUTION ORAL DAILY
Status: DISCONTINUED | OUTPATIENT
Start: 2018-06-07 | End: 2018-06-09 | Stop reason: HOSPADM

## 2018-06-07 RX ORDER — OXYCODONE HCL 5 MG/5 ML
SOLUTION, ORAL ORAL
Status: COMPLETED
Start: 2018-06-07 | End: 2018-06-07

## 2018-06-07 RX ORDER — DIPHENHYDRAMINE HYDROCHLORIDE 50 MG/ML
25 INJECTION INTRAMUSCULAR; INTRAVENOUS EVERY 6 HOURS PRN
Status: DISCONTINUED | OUTPATIENT
Start: 2018-06-07 | End: 2018-06-07

## 2018-06-07 RX ORDER — DIPHENHYDRAMINE HCL 25 MG
25 TABLET ORAL EVERY 6 HOURS PRN
Status: DISCONTINUED | OUTPATIENT
Start: 2018-06-07 | End: 2018-06-09 | Stop reason: HOSPADM

## 2018-06-07 RX ADMIN — VANCOMYCIN HYDROCHLORIDE 1500 MG: 1 INJECTION, POWDER, LYOPHILIZED, FOR SOLUTION INTRAVENOUS at 09:25

## 2018-06-07 RX ADMIN — SODIUM CHLORIDE 2 G: 9 INJECTION, SOLUTION INTRAVENOUS at 21:43

## 2018-06-07 RX ADMIN — HYDROCODONE BITARTRATE AND ACETAMINOPHEN 1 TABLET: 10; 325 TABLET ORAL at 19:31

## 2018-06-07 RX ADMIN — CELECOXIB 200 MG: 200 CAPSULE ORAL at 08:22

## 2018-06-07 RX ADMIN — LORATADINE 10 MG: 10 TABLET ORAL at 13:24

## 2018-06-07 RX ADMIN — FLECAINIDE ACETATE 100 MG: 100 TABLET ORAL at 21:43

## 2018-06-07 RX ADMIN — SODIUM CHLORIDE, SODIUM LACTATE, POTASSIUM CHLORIDE, CALCIUM CHLORIDE 1000 ML: 600; 310; 30; 20 INJECTION, SOLUTION INTRAVENOUS at 08:23

## 2018-06-07 RX ADMIN — Medication 25 MG: at 21:43

## 2018-06-07 RX ADMIN — SERTRALINE HYDROCHLORIDE 100 MG: 50 TABLET ORAL at 13:24

## 2018-06-07 RX ADMIN — OXYCODONE HYDROCHLORIDE 10 MG: 10 TABLET, FILM COATED, EXTENDED RELEASE ORAL at 08:22

## 2018-06-07 RX ADMIN — ATORVASTATIN CALCIUM 40 MG: 40 TABLET, FILM COATED ORAL at 21:43

## 2018-06-07 RX ADMIN — LEVALBUTEROL HYDROCHLORIDE 1.25 MG: 1.25 SOLUTION RESPIRATORY (INHALATION) at 16:43

## 2018-06-07 RX ADMIN — SODIUM CHLORIDE 2 G: 9 INJECTION, SOLUTION INTRAVENOUS at 17:32

## 2018-06-07 RX ADMIN — FENTANYL CITRATE 50 MCG: 50 INJECTION, SOLUTION INTRAMUSCULAR; INTRAVENOUS at 11:18

## 2018-06-07 RX ADMIN — DEXAMETHASONE SODIUM PHOSPHATE 4 MG: 4 INJECTION, SOLUTION INTRAMUSCULAR; INTRAVENOUS at 18:15

## 2018-06-07 RX ADMIN — STANDARDIZED SENNA CONCENTRATE AND DOCUSATE SODIUM 1 TABLET: 8.6; 5 TABLET, FILM COATED ORAL at 21:43

## 2018-06-07 RX ADMIN — DOCUSATE SODIUM 100 MG: 100 CAPSULE, LIQUID FILLED ORAL at 13:25

## 2018-06-07 RX ADMIN — ACETAMINOPHEN 1000 MG: 500 TABLET, COATED ORAL at 08:22

## 2018-06-07 RX ADMIN — WARFARIN SODIUM 10 MG: 5 TABLET ORAL at 17:32

## 2018-06-07 RX ADMIN — HYDROCODONE BITARTRATE AND ACETAMINOPHEN 1 TABLET: 10; 325 TABLET ORAL at 15:28

## 2018-06-07 RX ADMIN — DOCUSATE SODIUM 100 MG: 100 CAPSULE, LIQUID FILLED ORAL at 21:43

## 2018-06-07 RX ADMIN — LEVALBUTEROL HYDROCHLORIDE 1.25 MG: 1.25 SOLUTION RESPIRATORY (INHALATION) at 23:26

## 2018-06-07 ASSESSMENT — PAIN SCALES - GENERAL
PAINLEVEL_OUTOF10: 2
PAINLEVEL_OUTOF10: ASSUMED PAIN PRESENT
PAINLEVEL_OUTOF10: 1
PAINLEVEL_OUTOF10: 5
PAINLEVEL_OUTOF10: 2
PAINLEVEL_OUTOF10: 2
PAINLEVEL_OUTOF10: 4
PAINLEVEL_OUTOF10: 3
PAINLEVEL_OUTOF10: 4
PAINLEVEL_OUTOF10: 4

## 2018-06-07 ASSESSMENT — COPD QUESTIONNAIRES
DO YOU EVER COUGH UP ANY MUCUS OR PHLEGM?: NO/ONLY WITH OCCASIONAL COLDS OR INFECTIONS
COPD SCREENING SCORE: 3
HAVE YOU SMOKED AT LEAST 100 CIGARETTES IN YOUR ENTIRE LIFE: NO/DON'T KNOW
DURING THE PAST 4 WEEKS HOW MUCH DID YOU FEEL SHORT OF BREATH: NONE/LITTLE OF THE TIME

## 2018-06-07 ASSESSMENT — PATIENT HEALTH QUESTIONNAIRE - PHQ9
2. FEELING DOWN, DEPRESSED, IRRITABLE, OR HOPELESS: NOT AT ALL
SUM OF ALL RESPONSES TO PHQ9 QUESTIONS 1 AND 2: 0
1. LITTLE INTEREST OR PLEASURE IN DOING THINGS: NOT AT ALL

## 2018-06-07 ASSESSMENT — LIFESTYLE VARIABLES
ALCOHOL_USE: NO
EVER_SMOKED: YES
EVER_SMOKED: YES

## 2018-06-07 NOTE — CARE PLAN
Problem: Safety  Goal: Will remain free from falls  Outcome: PROGRESSING AS EXPECTED    Intervention: Implement fall precautions  Patient educated and understands the fall precautions in place to prevent falls.  Bed alarm is off, patient calls appropriately, IV pole closest to bathroom, treaded slipper socks on, and bedrails closest to bathroom down.  Patient also educated and understands the use of the call button for any assistance with mobility.      Problem: Venous Thromboembolism (VTW)/Deep Vein Thrombosis (DVT) Prevention:  Goal: Patient will participate in Venous Thrombosis (VTE)/Deep Vein Thrombosis (DVT)Prevention Measures  Outcome: PROGRESSING AS EXPECTED    Intervention: Ensure patient wears graduated elastic stockings (ANNELIESE hose) and/or SCDs, if ordered, when in bed or chair (Remove at least once per shift for skin check)  Patient is wearing SCDs and is receiving coumadin for DVT prophylaxis.

## 2018-06-07 NOTE — OR NURSING
0800 Pt. Allergies and NPO status verified, home medications reconciled. Belongings secured. Pt. Verbalizes understanding of pain scale, expected course of stay and plan of care. Surgical site verified with pt. Pt. And family given home care instructions for discharge planning. IV access established. Sequentials placed on legs.

## 2018-06-07 NOTE — OP REPORT
DATE OF SERVICE: 6/7/18    PREOPERATIVE DIAGNOSES:  1. right knee advanced tricompartmental arthritis.     POSTOPERATIVE DIAGNOSES:  1. right knee advanced tricompartmental arthritis.     PROCEDURE PERFORMED: right total knee arthroplasty    SURGEON: Eric Bunn MD.     ASSISTANT: SRIRAM Mao    ANESTHESIA: General with Adductor canal femoral nerve block for postoperative pain control.     ANESTHESIOLOGIST: Harley    ANTIBIOTICS: Ancef and Vancomycin.     IMPLANTS:  [employer] Gen II size 4 femur, 3 tibial baseplate, 11 PS poly, and 32 patellar button with 1 bag of antibiotic Simplex cement.     COMPLICATIONS: None    BLOOD LOSS: 100    INDICATIONS: The patient presents with a chief complaint of knee pain recalcitrant to conservative treatment. The patient has advanced knee arthritis. The risks of the surgery were discussed at length. All questions were answered, and no guarantees given. The patient elected to proceed with the proposed procedure.     DESCRIPTION OF PROCEDURE: The patient was properly identified in the preoperative holding room and the right knee was marked as the correct surgical site. The patient was taken to the operative suite and placed in supine on the operative table. The patient underwent general anesthesia. After review of allergies, antibiotics were administered, a time out was called. The right knee and lower extremity was sterilely prepped and draped in standard fashion. The limb was exsanguinated and tourniquet was inflated to 250mmHG. A standard midline incision was made followed by medial patellar arthrotomy. The medial and lateral meniscus were removed as well as the ACL. The PCL was resected. There was significant tricompartmental arthritis. The knee was placed in 90 degrees of flexion. A drill hole was made on the distal femur. Anterior referencing measured size 4. The anterior cut followed by a distal cut, followed by a 4-in-1 cutting block followed by  extramedullary tibial guide after checking appropriate rotations, slope and height. The tibia was drilled and pinned in place. The proximal tibia cut was performed. Appropriate soft tissue balance at 0 - 30 degrees. A size 3  tibial base had good coverage without overhang, was repaired and trialed, brought in extension with a 11 PS poly liner. The patella was measured with a caliper, a saw cut was made, drilled for a 32 button. This struck well. The femoral punch and tibial punch was used. The trial implants were removed. The implants were cemented in place, first the tibia, then the femur, brought in extension with # 11 poly and the the patella. This was soaked with betadine and saline and once the cement was thoroughly hard, the tourniquet was deflated. Good hemostasis was obtained. Once again, retrialed and the final 11 PS poly was used, irrigated and then closed in flexion with with # 2 Quill, reinforced with # 1 Vicryl, 2-0 Vicryl and staples on skin. All counts were correct. SRIRAM Mao, was present throughout the operation and key essential part of the success of the operation assisting with patient positioning, instrumentation, retraction, and closure.

## 2018-06-07 NOTE — FLOWSHEET NOTE
06/07/18 1646   Events/Summary/Plan   Events/Summary/Plan requested tx   Non-Invasive Resp Device Site Inspection Completed Intact   Interdisciplinary Plan of Care-Goals (Indications)   Bronchodilator Indications History / Diagnosis;Physical Exam / Hyperinflation / Wheezing (bronchospasm)   Interdisciplinary Plan of Care-Outcomes    Bronchodilator Outcome Improved Patient Appearance with Decreased use of Accessory Muscles   Education   Education Yes - Pt. / Family has been Instructed in use of Respiratory Medications and Adverse Reactions   RT Assessment of Delivered Medications   Evaluation of Medication Delivery Daily Yes-- Pt /Family has been Instructed in use of Respiratory Medications and Adverse Reactions   SVN Group   #SVN Performed Yes   Given By: Mouthpiece   Date SVN Last Changed 06/07/18   Date SVN Next Change Due (Q 7 Days) 07/07/18   Incentive Spirometry Group   Breathing Exercises Yes   Incentive Spirometer Volume 1250 mL   Chest Exam   Respiration 16   Heart Rate (Monitored) (!) 58   Breath Sounds   RUL Breath Sounds Clear   RML Breath Sounds Clear;Diminished   RLL Breath Sounds Clear;Diminished   KATHY Breath Sounds Clear   LLL Breath Sounds Clear;Diminished   Oximetry   Continuous Oximetry Yes   Oxygen   Pulse Oximetry 92 %   O2 (LPM) 2   O2 Daily Delivery Respiratory  Silicone Nasal Cannula

## 2018-06-07 NOTE — PROGRESS NOTES
"1047 To PACU from OR via bed,side rails up x 3 for safety, lungs clear bilaterally, scd to LLE on patient and machine operational, pt arouses easily to voice and reports pain but unable to quantify it. Denies nausea. +2 R pedal pulse with pink but cool toes and 3 sec cap refill. Placed warm blanket over R foot. Knee control locked straight on bed. /84, Dr Souza VORB \"it's okay if BP is running like that\" but if increases to pre-op levels to treat in PACU.   1100 Pt arouses easily to voice and reports pain as tolerable. Denies nausea. Placed warm blankets over patient for comfort on request. Polar care pad in place over R knee and machine operational.   1115 Pt reports R knee pain as 5/10 and not quite tolerable. Plan to give PRN pain medication. Xray called for film. Denies nausea. Pt c/o isolated LLE shaking; encouraged patient to do in bed L knee bending and L ankle rolling for relief.   1120 No LLE shaking noted at rest.   1135 Pt reports pain improved to tolerable and 4/10 to R knee. Tolerating sips of water. Transfer pending xray.   1150 Pt reports pain as tolerable with movement for xray completion; denies nausea. Meets criteria for transfer to stage II.     "

## 2018-06-07 NOTE — PROGRESS NOTES
Patient arrived to unit via hospital bed.  VSS.  Skin check and admit complete.  Patient reports 1/10 pain at this time.  SCDs and polar ice in place.  Patient requesting lunch tray, CNA called kitchen.  No other needs expressed.  Will continue to monitor.

## 2018-06-08 PROBLEM — E55.9 VITAMIN D DEFICIENCY: Status: ACTIVE | Noted: 2018-06-08

## 2018-06-08 PROBLEM — R09.81 NASAL CONGESTION: Status: ACTIVE | Noted: 2018-06-08

## 2018-06-08 PROBLEM — E03.9 HYPOTHYROIDISM: Status: ACTIVE | Noted: 2018-06-08

## 2018-06-08 PROBLEM — R06.00 DYSPNEA: Status: ACTIVE | Noted: 2018-06-08

## 2018-06-08 PROBLEM — N18.1 STAGE 1 CHRONIC KIDNEY DISEASE: Status: ACTIVE | Noted: 2018-06-08

## 2018-06-08 PROBLEM — E11.9 DIABETES MELLITUS (HCC): Status: ACTIVE | Noted: 2018-06-08

## 2018-06-08 LAB
ERYTHROCYTE [DISTWIDTH] IN BLOOD BY AUTOMATED COUNT: 45.1 FL (ref 35.9–50)
HCT VFR BLD AUTO: 41.4 % (ref 37–47)
HGB BLD-MCNC: 13.2 G/DL (ref 12–16)
INR PPP: 1 (ref 0.87–1.13)
MCH RBC QN AUTO: 29.9 PG (ref 27–33)
MCHC RBC AUTO-ENTMCNC: 31.9 G/DL (ref 33.6–35)
MCV RBC AUTO: 93.7 FL (ref 81.4–97.8)
PLATELET # BLD AUTO: 270 K/UL (ref 164–446)
PMV BLD AUTO: 10.6 FL (ref 9–12.9)
PROTHROMBIN TIME: 13.1 SEC (ref 12–14.6)
RBC # BLD AUTO: 4.42 M/UL (ref 4.2–5.4)
WBC # BLD AUTO: 12.7 K/UL (ref 4.8–10.8)

## 2018-06-08 PROCEDURE — G8987 SELF CARE CURRENT STATUS: HCPCS | Mod: CJ

## 2018-06-08 PROCEDURE — 700102 HCHG RX REV CODE 250 W/ 637 OVERRIDE(OP): Performed by: NURSE PRACTITIONER

## 2018-06-08 PROCEDURE — 770006 HCHG ROOM/CARE - MED/SURG/GYN SEMI*

## 2018-06-08 PROCEDURE — A9270 NON-COVERED ITEM OR SERVICE: HCPCS | Performed by: NURSE PRACTITIONER

## 2018-06-08 PROCEDURE — 94760 N-INVAS EAR/PLS OXIMETRY 1: CPT

## 2018-06-08 PROCEDURE — 94640 AIRWAY INHALATION TREATMENT: CPT

## 2018-06-08 PROCEDURE — 85610 PROTHROMBIN TIME: CPT

## 2018-06-08 PROCEDURE — 700101 HCHG RX REV CODE 250: Performed by: ORTHOPAEDIC SURGERY

## 2018-06-08 PROCEDURE — 700112 HCHG RX REV CODE 229: Performed by: ORTHOPAEDIC SURGERY

## 2018-06-08 PROCEDURE — 700111 HCHG RX REV CODE 636 W/ 250 OVERRIDE (IP): Performed by: NURSE PRACTITIONER

## 2018-06-08 PROCEDURE — G8979 MOBILITY GOAL STATUS: HCPCS | Mod: CI

## 2018-06-08 PROCEDURE — G8988 SELF CARE GOAL STATUS: HCPCS | Mod: CI

## 2018-06-08 PROCEDURE — 36415 COLL VENOUS BLD VENIPUNCTURE: CPT

## 2018-06-08 PROCEDURE — 97166 OT EVAL MOD COMPLEX 45 MIN: CPT

## 2018-06-08 PROCEDURE — 97161 PT EVAL LOW COMPLEX 20 MIN: CPT

## 2018-06-08 PROCEDURE — A9270 NON-COVERED ITEM OR SERVICE: HCPCS | Performed by: ORTHOPAEDIC SURGERY

## 2018-06-08 PROCEDURE — 85027 COMPLETE CBC AUTOMATED: CPT

## 2018-06-08 PROCEDURE — G8978 MOBILITY CURRENT STATUS: HCPCS | Mod: CJ

## 2018-06-08 RX ORDER — HYDROCODONE BITARTRATE AND ACETAMINOPHEN 10; 325 MG/1; MG/1
1 TABLET ORAL EVERY 4 HOURS PRN
Qty: 84 TAB | Refills: 0 | Status: SHIPPED | OUTPATIENT
Start: 2018-06-08 | End: 2018-06-22

## 2018-06-08 RX ADMIN — WARFARIN SODIUM 7.5 MG: 7.5 TABLET ORAL at 17:04

## 2018-06-08 RX ADMIN — HYDROCODONE BITARTRATE AND ACETAMINOPHEN 1 TABLET: 10; 325 TABLET ORAL at 12:46

## 2018-06-08 RX ADMIN — HYDROCODONE BITARTRATE AND ACETAMINOPHEN 1 TABLET: 10; 325 TABLET ORAL at 21:29

## 2018-06-08 RX ADMIN — SERTRALINE HYDROCHLORIDE 100 MG: 50 TABLET ORAL at 07:55

## 2018-06-08 RX ADMIN — BENZOCAINE AND MENTHOL 1 LOZENGE: 15; 3.6 LOZENGE ORAL at 09:01

## 2018-06-08 RX ADMIN — STANDARDIZED SENNA CONCENTRATE AND DOCUSATE SODIUM 1 TABLET: 8.6; 5 TABLET, FILM COATED ORAL at 21:24

## 2018-06-08 RX ADMIN — ATORVASTATIN CALCIUM 40 MG: 40 TABLET, FILM COATED ORAL at 21:24

## 2018-06-08 RX ADMIN — FLECAINIDE ACETATE 100 MG: 100 TABLET ORAL at 07:55

## 2018-06-08 RX ADMIN — LORATADINE 10 MG: 10 TABLET ORAL at 07:55

## 2018-06-08 RX ADMIN — FLECAINIDE ACETATE 100 MG: 100 TABLET ORAL at 21:24

## 2018-06-08 RX ADMIN — DOCUSATE SODIUM 100 MG: 100 CAPSULE, LIQUID FILLED ORAL at 21:23

## 2018-06-08 RX ADMIN — HYDROCODONE BITARTRATE AND ACETAMINOPHEN 1 TABLET: 10; 325 TABLET ORAL at 09:01

## 2018-06-08 RX ADMIN — DEXAMETHASONE SODIUM PHOSPHATE 10 MG: 4 INJECTION, SOLUTION INTRAMUSCULAR; INTRAVENOUS at 06:07

## 2018-06-08 RX ADMIN — HYDROCODONE BITARTRATE AND ACETAMINOPHEN 1 TABLET: 10; 325 TABLET ORAL at 04:46

## 2018-06-08 RX ADMIN — Medication 25 MG: at 21:26

## 2018-06-08 RX ADMIN — HYDROCODONE BITARTRATE AND ACETAMINOPHEN 1 TABLET: 10; 325 TABLET ORAL at 00:42

## 2018-06-08 RX ADMIN — HYDROCODONE BITARTRATE AND ACETAMINOPHEN 1 TABLET: 10; 325 TABLET ORAL at 17:04

## 2018-06-08 RX ADMIN — LEVALBUTEROL HYDROCHLORIDE 1.25 MG: 1.25 SOLUTION RESPIRATORY (INHALATION) at 13:23

## 2018-06-08 RX ADMIN — DOCUSATE SODIUM 100 MG: 100 CAPSULE, LIQUID FILLED ORAL at 07:55

## 2018-06-08 RX ADMIN — METOPROLOL SUCCINATE 100 MG: 100 TABLET, EXTENDED RELEASE ORAL at 07:55

## 2018-06-08 ASSESSMENT — PAIN SCALES - GENERAL
PAINLEVEL_OUTOF10: 2
PAINLEVEL_OUTOF10: 4
PAINLEVEL_OUTOF10: 2
PAINLEVEL_OUTOF10: 5
PAINLEVEL_OUTOF10: 2
PAINLEVEL_OUTOF10: 3
PAINLEVEL_OUTOF10: 5

## 2018-06-08 ASSESSMENT — ACTIVITIES OF DAILY LIVING (ADL): TOILETING: INDEPENDENT

## 2018-06-08 ASSESSMENT — ENCOUNTER SYMPTOMS
NAUSEA: 0
ABDOMINAL PAIN: 0
FEVER: 0
VOMITING: 1
DIZZINESS: 0
CHILLS: 0
COUGH: 1
SHORTNESS OF BREATH: 0

## 2018-06-08 ASSESSMENT — GAIT ASSESSMENTS
DEVIATION: DECREASED TOE OFF
DISTANCE (FEET): 150
GAIT LEVEL OF ASSIST: STAND BY ASSIST
ASSISTIVE DEVICE: FRONT WHEEL WALKER

## 2018-06-08 ASSESSMENT — COGNITIVE AND FUNCTIONAL STATUS - GENERAL
DAILY ACTIVITIY SCORE: 21
HELP NEEDED FOR BATHING: A LITTLE
SUGGESTED CMS G CODE MODIFIER DAILY ACTIVITY: CJ
DRESSING REGULAR LOWER BODY CLOTHING: A LITTLE
TOILETING: A LITTLE

## 2018-06-08 NOTE — PROGRESS NOTES
Bedside report received by NOC RN. Pt A&OX4, VS stable. Pt in no acute distress. Bed rails up X 2, call light and belongings within reach, patient encouraged to call for assistance. No needs at this time. Will continue to monitor.

## 2018-06-08 NOTE — FLOWSHEET NOTE
06/07/18 2326   Events/Summary/Plan   Events/Summary/Plan Nancy well upright in bed-spont strong NPC   Interdisciplinary Plan of Care-Goals (Indications)   Bronchodilator Indications History / Diagnosis   Interdisciplinary Plan of Care-Outcomes    Bronchodilator Outcome Patient at Stable Baseline   Education   Education Yes - Pt. / Family has been Instructed in use of Respiratory Medications and Adverse Reactions   RT Assessment of Delivered Medications   Evaluation of Medication Delivery Daily Yes-- Pt /Family has been Instructed in use of Respiratory Medications and Adverse Reactions   SVN Group   #SVN Performed Yes   Given By: Mouthpiece   Date SVN Last Changed 06/07/18   Date SVN Next Change Due (Q 7 Days) 06/14/18   Chest Exam   Respiration 18   Pulse 74   Work Of Breathing / Effort Mild   Breath Sounds   RUL Breath Sounds Clear   RML Breath Sounds Clear;Diminished   RLL Breath Sounds Diminished   KATHY Breath Sounds Clear   LLL Breath Sounds Diminished   Oximetry   #Pulse Oximetry (Single Determination) Yes   Continuous Oximetry Yes   Oxygen   Home O2 Use Prior To Admission? Yes   Home O2 LPM Flow 2 LPM   Home O2 Delivery Method Nasal Cannula   Home O2 Frequency of Use At Sleep   Pulse Oximetry 92 %   O2 (LPM) 2   O2 Daily Delivery Respiratory  Silicone Nasal Cannula

## 2018-06-08 NOTE — FLOWSHEET NOTE
06/08/18 1325   Events/Summary/Plan   Events/Summary/Plan SVN given on pt request   Interdisciplinary Plan of Care-Goals (Indications)   Bronchodilator Indications History / Diagnosis   Interdisciplinary Plan of Care-Outcomes    Bronchodilator Outcome Diminished Wheezing and Volume of Air Movement Increased   SVN Group   #SVN Performed Yes   Given By: Mouthpiece   Incentive Spirometry Group   Breathing Exercises Yes   Incentive Spirometer Volume 1250 mL   Chest Exam   Respiration 18  (post 18)   Pulse 74  (post 74)   Heart Rate (Monitored) 74   Work Of Breathing / Effort Mild   Breath Sounds   Pre/Post Intervention Pre Intervention Assessment  (increased aeration following svn)   RUL Breath Sounds Diminished   RML Breath Sounds Diminished   RLL Breath Sounds Diminished   KATHY Breath Sounds Diminished   LLL Breath Sounds Diminished   Oximetry   #Pulse Oximetry (Single Determination) Yes   Oxygen   Pulse Oximetry 90 %  (placed on 1.5 liters after svn. RN notified)   O2 (LPM) 0   O2 Daily Delivery Respiratory  Room Air with O2 Available

## 2018-06-08 NOTE — PROGRESS NOTES
Bedside report received from VIRA mcginnis. No acute changes at this time. Pt resting in bed, no signs of distress.

## 2018-06-08 NOTE — PROGRESS NOTES
CMS intact to RLE. Able to wiggle toes, dorsi/plantar flex. Pain tolerable. Ace wrap to leg- CDI. Updated on POC.

## 2018-06-08 NOTE — CARE PLAN
Problem: Venous Thromboembolism (VTW)/Deep Vein Thrombosis (DVT) Prevention:  Goal: Patient will participate in Venous Thrombosis (VTE)/Deep Vein Thrombosis (DVT)Prevention Measures  Outcome: PROGRESSING AS EXPECTED   06/07/18 1932   OTHER   Risk Assessment Score 3   VTE RISK High   Pharmacologic Prophylaxis Used Warfarin (Coumadin)   Mechanical/VTE Prophylaxis   Mechanical Prophylaxis  AV Foot Pumps;ANNELIESE Hose (Graduated Compression Stockings);SCDs, Sequential Compression Device   AV Foot Pumps On   ANNELIESE Hose (Graduated Compression Stockings) On   SCDs, Sequential Compression Device On       Problem: Pain Management  Goal: Pain level will decrease to patient's comfort goal  Outcome: PROGRESSING AS EXPECTED   06/08/18 0047   OTHER   Pain Scale 0 - 10  2   Non Verbal Scale  Calm;Unlabored Breathing   Comfort Goal Comfort at Rest;Comfort with Movement

## 2018-06-08 NOTE — THERAPY
"Occupational Therapy Evaluation completed.   Functional Status:  Pt is a 76 y/o female, admit for R TKA. Pt lives with her , who alexandra not be able to assist with care after d/c- per Pt report. PLOF - I with AMB ADLS with the use of a cane. Pt presents with c/o pain during ADLS and functional mobility, has decreased activity tolerance, decreased I with ADLS and functional mobility. Pt requires Set up for UB self care, CGA with extra time for LB, and SBA for functional transfers . Pt was educated regarding techniques for ADLS and functional mobility post surgery. Pt will benefit from Acute OT services to increase functional I and safety prior to d/c.  Plan of Care: Will benefit from Occupational Therapy 3 times per week  Discharge Recommendations:  Equipment: Will Continue to Assess for Equipment Needs. Post-acute therapy Discharge to home with outpatient or home health for additional skilled therapy services.    See \"Rehab Therapy-Acute\" Patient Summary Report for complete documentation.    "

## 2018-06-08 NOTE — PROGRESS NOTES
Pt experiencing vomiting at this time. PONV protocol initiated. Pt currently vomiting, will administer decadron, see MAR.

## 2018-06-08 NOTE — PROGRESS NOTES
Assumed care of patient at 1900.  Patient is sitting up in bed and denies needs at this time.  Oxygen remains at 2 liters per NC and breathing appears unlabored.  Patient encouraged to cough and deep breathe and using IS.  Polar ice to right knee and dressing remains intact.  SCD to left lower leg and foot pump to right foot.  Toes to right foot are pink and warm and patient able to move foot well.  Patient rating pain at 2/10 and encouraged taking pain meds every 4 hours even while block is intact.  Hourly rounding continues.

## 2018-06-08 NOTE — PROGRESS NOTES
"Orthopaedic Progress Note    Author: Janina Abas Date & Time created: 6/8/2018   8:32 AM     Interval Events:  POD # 1 right TKA  Doing well.   Block now starting to wear off. Increasing pain.   Ambulatory with walker to bathroom.   Incontinence slightly worse since surgery. Was present prior to surgery.   C/O cough and sore throat. Required breathing tx. On O2. States feels better with Oxygen.   Tolerating breakfast this am. Denies any nausea/vomiting currently.         Review of Systems   Constitutional: Negative for chills and fever.   Respiratory: Positive for cough. Negative for shortness of breath.         Sore throat.     Cardiovascular: Negative for chest pain.   Gastrointestinal: Positive for vomiting (x 1 last night. ). Negative for abdominal pain and nausea.   Genitourinary:        History of incontinence, unchanged.     Neurological: Negative for dizziness.     Hemodynamics:  Blood pressure 141/57, pulse 69, temperature 36.6 °C (97.9 °F), resp. rate 16, height 1.626 m (5' 4\"), weight 96.9 kg (213 lb 10 oz), SpO2 92 %.     No Active Precaution Orders    Respiratory:    Respiration: 16, Pulse Oximetry: 92 %, O2 Daily Delivery Respiratory : Silicone Nasal Cannula     Given By:: Mouthpiece, Work Of Breathing / Effort: Mild  RUL Breath Sounds: Clear, RML Breath Sounds: Clear;Diminished, RLL Breath Sounds: Diminished, KATHY Breath Sounds: Clear, LLL Breath Sounds: Diminished  Fluids:    Intake/Output Summary (Last 24 hours) at 06/08/18 0832  Last data filed at 06/08/18 0637   Gross per 24 hour   Intake             2335 ml   Output              550 ml   Net             1785 ml     Admit Weight: 96.9 kg (213 lb 10 oz)  Current      Physical Exam   Constitutional: She is oriented to person, place, and time. She appears well-developed. She appears distressed.   Cardiovascular: Normal rate.    Pulmonary/Chest: Effort normal.   Cough     Abdominal: She exhibits no distension.   Musculoskeletal:   DNVI PF/DF " intact.   No calf ttp, redness heat     Neurological: She is alert and oriented to person, place, and time.   Skin: Skin is warm and dry.   Incision - min serosang drainage on dressing. Otherwise c/d/I       Labs:  Recent Labs      06/08/18   0435   WBC  12.7*   RBC  4.42   HEMOGLOBIN  13.2   HEMATOCRIT  41.4   MCV  93.7   MCH  29.9   MCHC  31.9*   RDW  45.1   PLATELETCT  270   MPV  10.6           Medical Decision Making/Problem List:    Right knee djd  Asthma - cont current medications. Respiratory protocol. IS.       Core Measures & Quality Metrics:  Current DVT prophylaxis: Warfarin - return to normal at home dose. Check PT/INR today and tomorrow in am. Has a lab slip to get checked after discharge. Dr. Tsang - cardiology  Discussed patient condition with RN and Patient.  Clearance for lovenox/heparin: n/a  Weight Bearing Status: WBAT. ROM as tolerated.   Wounds & Drains: Keep clean, dry and covered. Change dressing tomorrow in am.   Disposition and Follow-up: Cont. PT/OT. Monitor pain as the block continues to wear off. May need to adjust pain medications if pain worsens.   Aggressive RT protocol. IS 10x/ hour while awake. Wean o2 per protocol. Still requiring O2 at this time.   Patient lives in [place]. Elderly  will be her care taker after surgery.   Outpatient therapy scheduled.   Has a walker for home.     D/C home tomorrow.

## 2018-06-08 NOTE — PROGRESS NOTES
Pt up ambulating multiple times throughout morning. Pain controlled. RT called again to inquire regarding nebulizer treatment. Stated will be up soon. Pt updated.

## 2018-06-08 NOTE — CARE PLAN
Problem: Safety  Goal: Will remain free from falls  Outcome: PROGRESSING AS EXPECTED  Patient educated to call for assisstance, treaded slipper socks, call light w/in reach.     Problem: Pain Management  Goal: Pain level will decrease to patient's comfort goal  Outcome: PROGRESSING AS EXPECTED  Patient educated on importance of pain management. Patient understands need for scheduled analgesics to prevent pain above comfort level.

## 2018-06-08 NOTE — THERAPY
"Physical Therapy Evaluation completed.   Bed Mobility:     Transfers: Sit to Stand: Stand by Assist  Gait: Level Of Assist: Stand by Assist with Front-Wheel Walker       Plan of Care: Will benefit from Physical Therapy 7 times per week  Discharge Recommendations: Equipment: Will Continue to Assess for Equipment Needs. Post-acute therapy Discharge to home with outpatient or home health for additional skilled therapy services.    See \"Rehab Therapy-Acute\" Patient Summary Report for complete documentation.   Pt is a 77 y.o.f. referred to PT s/p right TKA.  Pt lives with her  who is not able to offer much assistance.  Pt has a ramp into her home.  Pt states she has a hx of TIA's, so she does tend to lose her balance.  Pt educated in ice and elevation at home.  Pt educated in HEP and handout provided.  Pt demonstrated good understanding.  Pt educated in gait on level surfaces and stairs with fww.  Pt demonstrated good understanding and carry over of proper gait pattern.   Pt presents with right LE weakness and decreased ROM and would benefit from cont skilled PT in acute care setting.  "

## 2018-06-09 VITALS
WEIGHT: 213.63 LBS | TEMPERATURE: 97.7 F | SYSTOLIC BLOOD PRESSURE: 153 MMHG | BODY MASS INDEX: 36.47 KG/M2 | DIASTOLIC BLOOD PRESSURE: 66 MMHG | HEART RATE: 89 BPM | HEIGHT: 64 IN | RESPIRATION RATE: 18 BRPM | OXYGEN SATURATION: 95 %

## 2018-06-09 LAB
ERYTHROCYTE [DISTWIDTH] IN BLOOD BY AUTOMATED COUNT: 47.5 FL (ref 35.9–50)
HCT VFR BLD AUTO: 39.9 % (ref 37–47)
HGB BLD-MCNC: 12.6 G/DL (ref 12–16)
INR PPP: 1.53 (ref 0.87–1.13)
MCH RBC QN AUTO: 30.2 PG (ref 27–33)
MCHC RBC AUTO-ENTMCNC: 31.6 G/DL (ref 33.6–35)
MCV RBC AUTO: 95.7 FL (ref 81.4–97.8)
PLATELET # BLD AUTO: 247 K/UL (ref 164–446)
PMV BLD AUTO: 10.6 FL (ref 9–12.9)
PROTHROMBIN TIME: 18.2 SEC (ref 12–14.6)
RBC # BLD AUTO: 4.17 M/UL (ref 4.2–5.4)
WBC # BLD AUTO: 9.4 K/UL (ref 4.8–10.8)

## 2018-06-09 PROCEDURE — 94760 N-INVAS EAR/PLS OXIMETRY 1: CPT

## 2018-06-09 PROCEDURE — A9270 NON-COVERED ITEM OR SERVICE: HCPCS | Performed by: NURSE PRACTITIONER

## 2018-06-09 PROCEDURE — 85610 PROTHROMBIN TIME: CPT

## 2018-06-09 PROCEDURE — G8980 MOBILITY D/C STATUS: HCPCS | Mod: CI

## 2018-06-09 PROCEDURE — 97116 GAIT TRAINING THERAPY: CPT

## 2018-06-09 PROCEDURE — 97110 THERAPEUTIC EXERCISES: CPT

## 2018-06-09 PROCEDURE — 94640 AIRWAY INHALATION TREATMENT: CPT

## 2018-06-09 PROCEDURE — 700102 HCHG RX REV CODE 250 W/ 637 OVERRIDE(OP): Performed by: NURSE PRACTITIONER

## 2018-06-09 PROCEDURE — G8979 MOBILITY GOAL STATUS: HCPCS | Mod: CI

## 2018-06-09 PROCEDURE — 36415 COLL VENOUS BLD VENIPUNCTURE: CPT

## 2018-06-09 PROCEDURE — 85027 COMPLETE CBC AUTOMATED: CPT

## 2018-06-09 PROCEDURE — 700112 HCHG RX REV CODE 229: Performed by: ORTHOPAEDIC SURGERY

## 2018-06-09 PROCEDURE — A9270 NON-COVERED ITEM OR SERVICE: HCPCS | Performed by: ORTHOPAEDIC SURGERY

## 2018-06-09 PROCEDURE — 700101 HCHG RX REV CODE 250: Performed by: ORTHOPAEDIC SURGERY

## 2018-06-09 RX ADMIN — METOPROLOL SUCCINATE 100 MG: 100 TABLET, EXTENDED RELEASE ORAL at 08:50

## 2018-06-09 RX ADMIN — HYDROCODONE BITARTRATE AND ACETAMINOPHEN 1 TABLET: 10; 325 TABLET ORAL at 03:53

## 2018-06-09 RX ADMIN — LORATADINE 10 MG: 10 TABLET ORAL at 08:50

## 2018-06-09 RX ADMIN — FLECAINIDE ACETATE 100 MG: 100 TABLET ORAL at 08:50

## 2018-06-09 RX ADMIN — LEVALBUTEROL HYDROCHLORIDE 1.25 MG: 1.25 SOLUTION RESPIRATORY (INHALATION) at 07:52

## 2018-06-09 RX ADMIN — DOCUSATE SODIUM 100 MG: 100 CAPSULE, LIQUID FILLED ORAL at 08:50

## 2018-06-09 RX ADMIN — SERTRALINE HYDROCHLORIDE 100 MG: 50 TABLET ORAL at 08:50

## 2018-06-09 RX ADMIN — HYDROCODONE BITARTRATE AND ACETAMINOPHEN 1 TABLET: 10; 325 TABLET ORAL at 08:50

## 2018-06-09 RX ADMIN — HYDROCODONE BITARTRATE AND ACETAMINOPHEN 1 TABLET: 10; 325 TABLET ORAL at 13:33

## 2018-06-09 RX ADMIN — POLYETHYLENE GLYCOL (3350) 1 PACKET: 17 POWDER, FOR SOLUTION ORAL at 08:51

## 2018-06-09 ASSESSMENT — PAIN SCALES - GENERAL
PAINLEVEL_OUTOF10: 2
PAINLEVEL_OUTOF10: 4
PAINLEVEL_OUTOF10: 6
PAINLEVEL_OUTOF10: 2

## 2018-06-09 ASSESSMENT — GAIT ASSESSMENTS
DISTANCE (FEET): 100
DEVIATION: DECREASED TOE OFF
GAIT LEVEL OF ASSIST: SUPERVISED
ASSISTIVE DEVICE: FRONT WHEEL WALKER

## 2018-06-09 NOTE — FLOWSHEET NOTE
06/09/18 0741   Events/Summary/Plan   Non-Invasive Resp Device Site Inspection Completed Intact   Interdisciplinary Plan of Care-Goals (Indications)   Bronchodilator Indications History / Diagnosis   Interdisciplinary Plan of Care-Outcomes    Bronchodilator Outcome Diminished Wheezing and Volume of Air Movement Increased   Education   Education Yes - Pt. / Family has been Instructed in use of Respiratory Medications and Adverse Reactions   RT Assessment of Delivered Medications   Evaluation of Medication Delivery Daily Yes-- Pt /Family has been Instructed in use of Respiratory Medications and Adverse Reactions   SVN Group   #SVN Performed Yes   Given By: Mouthpiece   Date SVN Next Change Due (Q 7 Days) 06/14/18   Incentive Spirometry Group   Breathing Exercises Yes   Incentive Spirometer Volume 1250 mL   Incentive Spirometer Next Change Date (Q 30 Days) 07/07/18   Chest Exam   Respiration 18   Pulse (!) 58   Work Of Breathing / Effort Mild   Breath Sounds   RUL Breath Sounds Expiratory Wheezes   RML Breath Sounds Diminished   RLL Breath Sounds Diminished   KATHY Breath Sounds Expiratory Wheezes   LLL Breath Sounds Diminished   Oximetry   #Pulse Oximetry (Single Determination) Yes   Oxygen   Pulse Oximetry 95 %   O2 (LPM) 1.5   O2 Daily Delivery Respiratory  Silicone Nasal Cannula

## 2018-06-09 NOTE — DISCHARGE INSTRUCTIONS
Discharge Instructions    Ice. Elevate. Call for incision redness, heat, pus drainage, edema, or fever, chills, nausea, vomiting, diarrhea. Take daily stool softeners or laxatives prn as narcotic pain medications cause constipation.  Ok to shower but keep incision dry and covered. Keep dressings in place for 5 days, change as needed for drainage. Keep incision covered with clean dressing until staples/stitches are removed. There is no need to place any ointment over the incision. Follow up with Dr. Bunn in 7-10 days as scheduled. Weight Bearing as tolerated. START Physical Therapy This Week. Place Ambrosio Hose Bilateral lower extremities. You may remove them for one hour daily and for therapy; otherwise keep on until seen by Dr. Bunn.    Discharged to home by car with relative. Discharged via wheelchair, hospital escort: Yes.  Special equipment needed: Walker    Be sure to schedule a follow-up appointment with your primary care doctor or any specialists as instructed.     Discharge Plan:   Diet Plan: Discussed  Activity Level: Discussed  Confirmed Follow up Appointment: Patient to Call and Schedule Appointment  Confirmed Symptoms Management: Discussed  Medication Reconciliation Updated: Yes  Pneumococcal Vaccine Administered/Refused: Not given - Patient refused pneumococcal vaccine  Influenza Vaccine Indication: Patient Refuses    I understand that a diet low in cholesterol, fat, and sodium is recommended for good health. Unless I have been given specific instructions below for another diet, I accept this instruction as my diet prescription.   Other diet: regular    Special Instructions: Discharge instructions for the Orthopedic Patient    Follow up with Primary Care Physician within 2 weeks of discharge to home, regarding:  Review of medications and diagnostic testing.  Surveillance for medical complications.  Workup and treatment of osteoporosis, if appropriate.     -Is this a Joint Replacement patient? Yes  Total Joint Knee Replacement Discharge Instructions    Pain  - The goal is to slowly wean off the prescription pain medicine.  - Ice can be used for pain control.  20 minutes at a time is recommended, and never directly against your skin or incision.  - Most patients are off the pain pills by 3 weeks; others may require a low level of pain medications for many months.  If your pain continues to be severe, follow up with your physician.  Infection    Knee joint infections; occur in fewer than 2% of patients. The most common causes of infection following total knee replacement surgery are from bacteria that enter the bloodstream during dental procedures, urinary tract infections, or skin infections. These bacteria can lodge around your knee replacement and cause an infection.  - Keep the incision as clean and dry as possible.  - Always wash your hands before touching your incision.  - Skin infections tend to develop around 7-10 days after surgery; most can be treated with oral antibiotics.  - Dental Care should be delayed for 3 months after surgery, your surgeon recommends taking a dose of antibiotics 1 hour prior to any dental procedure. After 2 years, most surgeons recommend antibiotics only before an extensive procedure.  Ask your surgeon what he recommends.  - Signs and symptoms of infection can include:  low grade fever, redness, pain, swelling and drainage from your incision.  Notify your surgeon immediately if you develop any of these symptoms.  Other instructions  - Bowel habits - constipation is extremely common and is caused by a combination of anesthesia, lack of mobility and pain medicine.  Use stool softeners or laxatives if necessary. It is important not to ignore this problem, as bowel obstructions can be a serious complication after joint replacement surgery.  - Mood/Energy Level - Many patients experience a lack of energy and endurance for up to 2-3 months after surgery.  Some may also feel down and  can even become depressed.  This is likely due to the postoperative anemia, change in activity level, lack of sleep, pain medicine and just the emotional reaction to the surgery itself that is a big disruption in a person’s life.  This usually passes.  If symptoms persist, follow up with your primary physician.  - Returning to work - Your surgeon will give you more specific instructions. Depending on the type of activities you perform, it may be 6 to 8 weeks before you return to work.   Generally, if you work a sedentary job requiring little standing or walking, most patients may return within 2-6 weeks.  Manual labor jobs involving walking, lifting and standing may take longer. Your surgeon’s office can provide a release to part-time or light duty work early on in your recovery and progress you to full duty as able.    - Driving - If your left knee was replaced and you have an automatic transmission, you may be able to begin driving in a week or so, provided you are no longer taking narcotic pain medication. If your right knee was replaced, avoid driving for 6 to 8 weeks. Remember that your reflexes may not be as sharp as before your surgery. Ask your surgeon for specific instructions.   - Avoiding falls - A fall during the first few weeks after surgery can damage your new knee and may result in a need for further surgery.   throw rugs and tack down loose carpeting.  Be aware of floor hazards such as pets, small objects or uneven surfaces.    - [employer] Metal Detectors - The sensitivity of metal detectors varies and it is likely that your prosthesis will cause an alarm.  Inform the  of your artificial joint.  Diet  - Resume your normal diet as tolerated.  - It is important to achieve a healthy nutritional status by eating a well balanced diet on a regular basis.  - Your physician may recommend that you take iron and vitamin supplements.   - Continue to drink plenty of  fluids.  Shower/Bathing  - You may shower as soon as you get home from the hospital unless otherwise instructed.  - Keep your incision out of water.  To keep the incision dry when showering, cover it with a plastic bag or plastic wrap.  - Pat incision dry if it gets wet.  Don’t rub.  - Do not submerge in a bath until staples are out and the incision is completely healed. (Approximately 6-8 weeks)  Dressing Change:  Procedure (if recommended by your physician)  - Wash hands.  - Open all new dressing change materials.  - Remove old dressing and discard.  - Inspect incision for redness, increase in clear drainage, yellow/green drainage, odor and surrounding skin hot to touch.  -  ABD (large gauze) pad or “island dressing” by one corner and lay over the incision.  Be careful not to touch the inside of the dressing that will lay over the incision.  - Secure in place as instructed (Ace wrap or tape).    Swelling/Bruising    - Swelling can last from 3-6 months.  - Elevate your leg higher than your heart while reclining.   The first week you are home you should elevate your leg an equal amount of time, as you are active.    - Anti-inflammatory pills can be taken once you have stopped the blood thinners.  - The swelling is usually worse after you go home since you are upright for longer periods of time.  - Bruising is common and can involve the entire leg including the thigh, calf and even foot. Bruising often does not appear until after you arrive home and it can be quite dramatic- purple, black, and green.  The bruising you can see is not usually concerning and will subside without any treatment.      Blood Clot Prevention  Blood clots in the legs and the less common, but frightening, clots that travel to the lungs are a real focus of our preventative. Most patients are at standard risk for them, but those patients who are at higher risk include people who have had previous clots, a family history of clotting,  smoking, diabetes, obesity, advanced age, use of estrogen and a sedentary lifestyle.    - Signs of blood clots in legs - Swelling in thigh, calf or ankle that does not go down with elevation.  Pain, heat and tenderness in calf, back of calf or groin area.  NOTE: blood clots can occur in either leg.  - You have been receiving anticoagulant therapy (blood thinners) in the hospital and you may be instructed to continue at home depending on your risk factors.  - Your risk for developing a clot continues for up to 2-3 months after surgery.  You should avoid prolonged sitting and dehydration during that time (long air trips and car trips).  If you do take a trip during this time, please get up and move around every 1- 1.5 hours.  - If you are prescribed blood-thinning medication for home, follow instructions as directed. (Handouts provided if applicable).      Activity  Once home, you should continue to stay active. The key is to remember not to overdo it! While you can expect some good days and some bad days, you should notice a gradual improvement and a gradual increase in your endurance over the next 6 to 12 months. Exercise is a critical component of home care, particularly during the first few weeks after surgery.     - Normal activities of daily living You should be able to resume most within 3 to 6 weeks following surgery. Some pain with activity and at night is common for several weeks after surgery  -  Physical Therapy Exercises - Continue to do the exercises prescribed for at least two months after surgery. Riding a stationary bicycle can help maintain muscle tone and keep your knee flexible. Try to achieve the maximum degree of bending and extension possible. (handout provided by Therapist).  - Sexual Activity -. Your surgeon can tell you when it safe to resume sexual activity.    - Sleeping Positions - You can safely sleep on your back, on either side, or on your stomach.   - Other Activities - Walk as much as  you like, but remember that walking is no substitute for the exercises your doctor and physical therapist will prescribe. Lower impact activities are preferred.  If you have specific questions, consult your Surgeon.    When to Call the Doctor   Call the physician if:   - Fever over 100.5? F  - Increased pain, drainage, redness, odor or heat around the incision area  - Shaking chills  - Increased knee pain with activity and rest  - Increased pain in calf, tenderness or redness above or below the knee  - Increased swelling of calf, ankle, foot  - Sudden increased shortness of breath, sudden onset of chest pain, localized chest pain with coughing  - Incision opening  Or, if there are any questions or concerns about medications or care.       -Is this patient being discharged with medication to prevent blood clots?  No    · Is patient discharged on Warfarin / Coumadin?   Yes    You are receiving the drug warfarin. Please understand the importance of monitoring warfarin with scheduled PT/INR blood draws.  Follow-up with the [facility] in one week for INR lab.    IMPORTANT: HOW TO USE THIS INFORMATION:  This is a summary and does NOT have all possible information about this product. This information does not assure that this product is safe, effective, or appropriate for you. This information is not individual medical advice and does not substitute for the advice of your health care professional. Always ask your health care professional for complete information about this product and your specific health needs.      WARFARIN - ORAL (WARF-uh-rin)      COMMON BRAND NAME(S): Coumadin      WARNING:  Warfarin can cause very serious (possibly fatal) bleeding. This is more likely to occur when you first start taking this medication or if you take too much warfarin. To decrease your risk for bleeding, your doctor or other health care provider will monitor you closely and check your lab results (INR test) to make sure you are  "not taking too much warfarin. Keep all medical and laboratory appointments. Tell your doctor right away if you notice any signs of serious bleeding. See also Side Effects section.      USES:  This medication is used to treat blood clots (such as in deep vein thrombosis-DVT or pulmonary embolus-PE) and/or to prevent new clots from forming in your body. Preventing harmful blood clots helps to reduce the risk of a stroke or heart attack. Conditions that increase your risk of developing blood clots include a certain type of irregular heart rhythm (atrial fibrillation), heart valve replacement, recent heart attack, and certain surgeries (such as hip/knee replacement). Warfarin is commonly called a \"blood thinner,\" but the more correct term is \"anticoagulant.\" It helps to keep blood flowing smoothly in your body by decreasing the amount of certain substances (clotting proteins) in your blood.      HOW TO USE:  Read the Medication Guide provided by your pharmacist before you start taking warfarin and each time you get a refill. If you have any questions, ask your doctor or pharmacist. Take this medication by mouth with or without food as directed by your doctor or other health care professional, usually once a day. It is very important to take it exactly as directed. Do not increase the dose, take it more frequently, or stop using it unless directed by your doctor. Dosage is based on your medical condition, laboratory tests (such as INR), and response to treatment. Your doctor or other health care provider will monitor you closely while you are taking this medication to determine the right dose for you. Use this medication regularly to get the most benefit from it. To help you remember, take it at the same time each day. It is important to eat a balanced, consistent diet while taking warfarin. Some foods can affect how warfarin works in your body and may affect your treatment and dose. Avoid sudden large increases or " decreases in your intake of foods high in vitamin K (such as broccoli, cauliflower, cabbage, brussels sprouts, kale, spinach, and other green leafy vegetables, liver, green tea, certain vitamin supplements). If you are trying to lose weight, check with your doctor before you try to go on a diet. Cranberry products may also affect how your warfarin works. Limit the amount of cranberry juice (16 ounces/480 milliliters a day) or other cranberry products you may drink or eat.      SIDE EFFECTS:  Nausea, loss of appetite, or stomach/abdominal pain may occur. If any of these effects persist or worsen, tell your doctor or pharmacist promptly. Remember that your doctor has prescribed this medication because he or she has judged that the benefit to you is greater than the risk of side effects. Many people using this medication do not have serious side effects. This medication can cause serious bleeding if it affects your blood clotting proteins too much (shown by unusually high INR lab results). Even if your doctor stops your medication, this risk of bleeding can continue for up to a week. Tell your doctor right away if you have any signs of serious bleeding, including: unusual pain/swelling/discomfort, unusual/easy bruising, prolonged bleeding from cuts or gums, persistent/frequent nosebleeds, unusually heavy/prolonged menstrual flow, pink/dark urine, coughing up blood, vomit that is bloody or looks like coffee grounds, severe headache, dizziness/fainting, unusual or persistent tiredness/weakness, bloody/black/tarry stools, chest pain, shortness of breath, difficulty swallowing. Tell your doctor right away if any of these unlikely but serious side effects occur: persistent nausea/vomiting, severe stomach/abdominal pain, yellowing eyes/skin. This drug rarely has caused very serious (possibly fatal) problems if its effects lead to small blood clots (usually at the beginning of treatment). This can lead to severe skin/tissue  damage that may require surgery or amputation if left untreated. Patients with certain blood conditions (protein C or S deficiency) may be at greater risk. Get medical help right away if any of these rare but serious side effects occur: painful/red/purplish patches on the skin (such as on the toe, breast, abdomen), change in the amount of urine, vision changes, confusion, slurred speech, weakness on one side of the body. A very serious allergic reaction to this drug is rare. However, get medical help right away if you notice any symptoms of a serious allergic reaction, including: rash, itching/swelling (especially of the face/tongue/throat), severe dizziness, trouble breathing. This is not a complete list of possible side effects. If you notice other effects not listed above, contact your doctor or pharmacist. In the [place] - Call your doctor for medical advice about side effects. You may report side effects to FDA at 1-198-QSC-8903. In [place] - Call your doctor for medical advice about side effects. You may report side effects to [facility] [place] at 1-763.606.6074.      PRECAUTIONS:  Before taking warfarin, tell your doctor or pharmacist if you are allergic to it; or if you have any other allergies. This product may contain inactive ingredients, which can cause allergic reactions or other problems. Talk to your pharmacist for more details. Before using this medication, tell your doctor or pharmacist your medical history, especially of: blood disorders (such as anemia, hemophilia), bleeding problems (such as bleeding of the stomach/intestines, bleeding in the brain), blood vessel disorders (such as aneurysms), recent major injury/surgery, liver disease, alcohol use, mental/mood disorders (including memory problems), frequent falls/injuries. It is important that all your doctors and dentists know that you take warfarin. Before having surgery or any medical/dental procedures, tell your doctor or dentist that you are taking  this medication and about all the products you use (including prescription drugs, nonprescription drugs, and herbal products). Avoid getting injections into the muscles. If you must have an injection into a muscle (for example, a flu shot), it should be given in the arm. This way, it will be easier to check for bleeding and/or apply pressure bandages. This medication may cause stomach bleeding. Daily use of alcohol while using this medicine will increase your risk for stomach bleeding and may also affect how this medication works. Limit or avoid alcoholic beverages. If you have not been eating well, if you have an illness or infection that causes fever, vomiting, or diarrhea for more than 2 days, or if you start using any antibiotic medications, contact your doctor or pharmacist immediately because these conditions can affect how warfarin works. This medication can cause heavy bleeding. To lower the chance of getting cut, bruised, or injured, use great caution with sharp objects like safety razors and nail cutters. Use an electric razor when shaving and a soft toothbrush when brushing your teeth. Avoid activities such as contact sports. If you fall or injure yourself, especially if you hit your head, call your doctor immediately. Your doctor may need to check you. The Food & Drug Administration has stated that generic warfarin products are interchangeable. However, consult your doctor or pharmacist before switching warfarin products. Be careful not to take more than one medication that contains warfarin unless specifically directed by the doctor or health care provider who is monitoring your warfarin treatment. Older adults may be at greater risk for bleeding while using this drug. This medication is not recommended for use during pregnancy because of serious (possibly fatal) harm to an unborn baby. Discuss the use of reliable forms of birth control with your doctor. If you become pregnant or think you may be  "pregnant, tell your doctor immediately. If you are planning pregnancy, discuss a plan for managing your condition with your doctor before you become pregnant. Your doctor may switch the type of medication you use during pregnancy. Very small amounts of this medication may pass into breast milk but is unlikely to harm a nursing infant. Consult your doctor before breast-feeding.      DRUG INTERACTIONS:  Drug interactions may change how your medications work or increase your risk for serious side effects. This document does not contain all possible drug interactions. Keep a list of all the products you use (including prescription/nonprescription drugs and herbal products) and share it with your doctor and pharmacist. Do not start, stop, or change the dosage of any medicines without your doctor's approval. Warfarin interacts with many prescription, nonprescription, vitamin, and herbal products. This includes medications that are applied to the skin or inside the vagina or rectum. The interactions with warfarin usually result in an increase or decrease in the \"blood-thinning\" (anticoagulant) effect. Your doctor or other health care professional should closely monitor you to prevent serious bleeding or clotting problems. While taking warfarin, it is very important to tell your doctor or pharmacist of any changes in medications, vitamins, or herbal products that you are taking. Some products that may interact with this drug include: capecitabine, imatinib, mifepristone. Aspirin, aspirin-like drugs (salicylates), and nonsteroidal anti-inflammatory drugs (NSAIDs such as ibuprofen, naproxen, celecoxib) may have effects similar to warfarin. These drugs may increase the risk of bleeding problems if taken during treatment with warfarin. Carefully check all prescription/nonprescription product labels (including drugs applied to the skin such as pain-relieving creams) since the products may contain NSAIDs or salicylates. Talk to " your doctor about using a different medication (such as acetaminophen) to treat pain/fever. Low-dose aspirin and related drugs (such as clopidogrel, ticlopidine) should be continued if prescribed by your doctor for specific medical reasons such as heart attack or stroke prevention. Consult your doctor or pharmacist for more details. Many herbal products interact with warfarin. Tell your doctor before taking any herbal products, especially bromelains, coenzyme Q10, cranberry, danshen, dong quai, fenugreek, garlic, ginkgo biloba, ginseng, and Jonathan's wort, among others. This medication may interfere with a certain laboratory test to measure theophylline levels, possibly causing false test results. Make sure laboratory personnel and all your doctors know you use this drug.      OVERDOSE:  If overdose is suspected, contact a poison control center or emergency room immediately. [place] residents can call the [place] National Poison Hotline at 1-202.349.4822. [place] residents can call a provincial poison control center. Symptoms of overdose may include: bloody/black/tarry stools, pink/dark urine, unusual/prolonged bleeding.      NOTES:  Do not share this medication with others. Laboratory and/or medical tests (such as INR, complete blood count) must be performed periodically to monitor your progress or check for side effects. Consult your doctor for more details.      MISSED DOSE:  For the best possible benefit, do not miss any doses. If you do miss a dose and remember on the same day, take it as soon as you remember. If you remember on the next day, skip the missed dose and resume your usual dosing schedule. Do not double the dose to catch up because this could increase your risk for bleeding. Keep a record of missed doses to give to your doctor or pharmacist. Contact your doctor or pharmacist if you miss 2 or more doses in a row.      STORAGE:  Store at room temperature away from light and moisture. Do not store in the  bathroom. Keep all medications away from children and pets. Do not flush medications down the toilet or pour them into a drain unless instructed to do so. Properly discard this product when it is  or no longer needed. Consult your pharmacist or local waste disposal company for more details about how to safely discard your product.      MEDICAL ALERT:  Your condition and medication can cause complications in a medical emergency. For information about enrolling in [employer], call 1-915.249.2304 ([place]) or 1-692.192.9948 ([place]).      Information last revised 2010 Copyright(c)  [employer]        Total Knee Replacement, Care After  Refer to this sheet in the next few weeks. These instructions provide you with information about caring for yourself after your procedure. Your health care provider may also give you more specific instructions. Your treatment has been planned according to current medical practices, but problems sometimes occur. Call your health care provider if you have any problems or questions after your procedure.  What can I expect after the procedure?  After the procedure, it is common to have:  · Pain and swelling.  · A small amount of blood or clear fluid coming from your incision.  · Limited range of motion.  Follow these instructions at home:  Medicines  · Take over-the-counter and prescription medicines only as told by your health care provider.  · If you were prescribed an antibiotic medicine, take it as told by your health care provider. Do not stop taking the antibiotic even if you start to feel better.  · If you were prescribed a blood thinner (anticoagulant), take it as told by your health care provider.  If you have a splint or brace:  · Wear the immobilizer as told by your health care provider. Remove it only as told by your health care provider.  · Loosen the immobilizer if your toes tingle, become numb, or turn cold and blue.  · Do not let your immobilizer get  wet if it is not waterproof.  · Keep the immobilizer clean.  Bathing  · Do not take baths, swim, or use a hot tub until your health care provider approves. Ask your health care provider if you can take showers. You may only be allowed to take sponge baths for bathing.  · If you have an immobilizer that is not waterproof, cover it with a watertight covering when you take a bath or shower.  · Keep your bandage (dressing) dry until your health care provider says it can be removed.  Incision care and drain care  · Check your incision area and drain site every day for signs of infection. Check for:  ¨ More redness, swelling, or pain.  ¨ More fluid or blood.  ¨ Warmth.  ¨ Pus or a bad smell.  · Follow instructions from your health care provider about how to take care of your incision. Make sure you:  ¨ Wash your hands with soap and water before you change your dressing. If soap and water are not available, use hand .  ¨ Change your dressing as told by your health care provider.  ¨ Leave stitches (sutures), skin glue, or adhesive strips in place. These skin closures may need to stay in place for 2 weeks or longer. If adhesive strip edges start to loosen and curl up, you may trim the loose edges. Do not remove adhesive strips completely unless your health care provider tells you to do that.  · If you have a drain, follow instructions from your health care provider about caring for it. Do not remove the drain tube or any dressings around the tube opening unless your health care provider approves.  Managing pain, stiffness, and swelling  · If directed, put ice on your knee.  ¨ Put ice in a plastic bag.  ¨ Place a towel between your skin and the bag.  ¨ Leave the ice on for 20 minutes, 2-3 times per day.  · If directed, apply heat to the affected area as often as told by your health care provider. Use the heat source that your health care provider recommends, such as a moist heat pack or a heating pad.  ¨ Place a  towel between your skin and the heat source.  ¨ Leave the heat on for 20-30 minutes.  ¨ Remove the heat if your skin turns bright red. This is especially important if you are unable to feel pain, heat, or cold. You may have a greater risk of getting burned.  · Move your toes often to avoid stiffness and to lessen swelling.  · Raise (elevate) your knee above the level of your heart while you are sitting or lying down.  · Wear elastic knee support for as long as told by your health care provider.  Driving  · Do not drive until your health care provider approves. Ask your health care provider when it is safe to drive if you have an immobilizer on your knee.  · Do not drive or operate heavy machinery while taking prescription pain medicine.  · Do not drive for 24 hours if you received a sedative.  Activity  · Do not lift anything that is heavier than 10 lb (4.5 kg) until your health care provider approves.  · Do not play contact sports until your health care provider approves.  · Avoid high-impact activities, including running, jumping rope, and jumping jacks.  · Avoid sitting for a long time without moving. Get up and move around at least every few hours.  · If physical therapy was prescribed, do exercises as told by your health care provider.  · Return to your normal activities as told by your health care provider. Ask your health care provider what activities are safe for you.  Safety  · Do not use your leg to support your body weight until your health care provider approves. Use crutches or a walker as told by your health care provider.  General instructions  · Do not have any dental work done for at least 3 months after your surgery. When you do have dental work done, tell your dentist about your joint replacement.  · Do not use any tobacco products, such as cigarettes, chewing tobacco, or e-cigarettes. If you need help quitting, ask your health care provider.  · Wear compression stockings as told by your health  care provider. These stockings help to prevent blood clots and reduce swelling in your legs.  · If you have been sent home with a continuous passive motion machine, use it as told by your health care provider.  · Drink enough fluid to keep your urine clear or pale yellow.  · If you have been instructed to lose weight, follow instructions from your health care provider about how to do this safely.  · Keep all follow-up visits as told by your health care provider. This is important.  Contact a health care provider if:  · You have more redness, swelling, or pain around your incision or drain.  · You have more fluid or blood coming from your incision or drain.  · Your incision or drain site feels warm to the touch.  · You have pus or a bad smell coming from your incision or drain.  · You have a fever.  · Your incision breaks open after your health care provider removes your sutures, skin glue, or adhesive tape.  · Your prosthesis feels loose.  · You have knee pain that does not go away.  Get help right away if:  · You have a rash.  · You have pain or swelling in your calf or thigh.  · You have shortness of breath or difficulty breathing.  · You have chest pain.  · Your range of motion in your knee is getting worse.  This information is not intended to replace advice given to you by your health care provider. Make sure you discuss any questions you have with your health care provider.  Document Released: 07/07/2006 Document Revised: 08/21/2017 Document Reviewed: 11/23/2016  [employer] Interactive Patient Education © 2017 [employer]           Depression / Suicide Risk    As you are discharged from this [facility] facility, it is important to learn how to keep safe from harming yourself.    Recognize the warning signs:  · Abrupt changes in personality, positive or negative- including increase in energy   · Giving away possessions  · Change in eating patterns- significant weight changes-  positive or negative  · Change  in sleeping patterns- unable to sleep or sleeping all the time   · Unwillingness or inability to communicate  · Depression  · Unusual sadness, discouragement and loneliness  · Talk of wanting to die  · Neglect of personal appearance   · Rebelliousness- reckless behavior  · Withdrawal from people/activities they love  · Confusion- inability to concentrate     If you or a loved one observes any of these behaviors or has concerns about self-harm, here's what you can do:  · Talk about it- your feelings and reasons for harming yourself  · Remove any means that you might use to hurt yourself (examples: pills, rope, extension cords, firearm)  · Get professional help from the community (Mental Health, Substance Abuse, psychological counseling)  · Do not be alone:Call your Safe Contact- someone whom you trust who will be there for you.  · Call your local CRISIS HOTLINE 226-6306 or 257-668-3709  · Call your local Children's Mobile Crisis Response Team [place] (863) 255-0131 or www.smsPREP  · Call the toll free National Suicide Prevention Hotlines   · National Suicide Prevention Lifeline 633-273-UIQJ (9960)  · National Hope Line Network 800-SUICIDE (654-2330)

## 2018-06-09 NOTE — PROGRESS NOTES
Discharge instructions given an discussed. Extra dressings given for home. Pt discharged home in stable condition.

## 2018-06-09 NOTE — PROGRESS NOTES
Pt states she uses 2 liters of oxygen at home during the night and sometimes during the day. Angy, Dr. Eduardo nurse practitioner, made aware pt was continuing to require 1.5 liters during the day. Pt has oxygen provider established. Per angy, pt can discharge home today since she has home O2 established. Orders received to Dc home today.

## 2018-06-09 NOTE — PROGRESS NOTES
Dressing changed to island dressing as ordered. Surgical incision assessed, no signs of infection or drainage at this time. bilateral ANNELIESE hose placed.

## 2018-06-09 NOTE — THERAPY
"Physical Therapy Treatment completed.   Bed Mobility:  Supine to Sit: Supervised  Transfers: Sit to Stand: Stand by Assist  Gait: Level Of Assist: Supervised with Front-Wheel Walker       Plan of Care: Patient with no further skilled PT needs in the acute care setting at this time  Discharge Recommendations: Equipment: No Equipment Needed. Post-acute therapy Discharge to home with outpatient or home health for additional skilled therapy services.     See \"Rehab Therapy-Acute\" Patient Summary Report for complete documentation.     Pt demonstrated good understanding of HEP using handouts to cue pt.  Pt stated she did her exercises indep yesterday during the day.  Pt demonstrated good understanding of proper gait pattern.  Pt had no LOB and demonstrated more erect stance.  Pt has ramp at home.  Pt verbalized understanding of proper sequencing up/down curb.  No further skilled PT needs in acute care setting.  "

## 2018-06-11 ENCOUNTER — PATIENT OUTREACH (OUTPATIENT)
Dept: HEALTH INFORMATION MANAGEMENT | Facility: OTHER | Age: 78
End: 2018-06-11

## 2018-06-13 ENCOUNTER — ANTICOAGULATION MONITORING (OUTPATIENT)
Dept: VASCULAR LAB | Facility: MEDICAL CENTER | Age: 78
End: 2018-06-13

## 2018-06-13 ENCOUNTER — PATIENT OUTREACH (OUTPATIENT)
Dept: HEALTH INFORMATION MANAGEMENT | Facility: OTHER | Age: 78
End: 2018-06-13

## 2018-06-13 NOTE — PROGRESS NOTES
Anticoagulation Summary  As of 6/13/2018    INR goal:   2.0-3.0   TTR:   63.7 % (7.9 mo)   Today's INR:   1.53! (6/9/2018)   Warfarin maintenance plan:   7.5 mg (5 mg x 1.5) on Sun, Thu, Sat; 5 mg (5 mg x 1) all other days   Weekly warfarin total:   42.5 mg   Plan last modified:   Marin BurchD (2/1/2018)   Next INR check:   6/14/2018   Target end date:   Indefinite    Indications    TIA (transient ischemic attack) [G45.9]  Atrial fibrillation (CMS-HCC) [I48.91] [I48.91]             Anticoagulation Episode Summary     INR check location:   Outside Lab    Preferred lab:       Send INR reminders to:       Comments:   [facility]      Anticoagulation Care Providers     Provider Role Specialty Phone number    Darlene Moore M.D.  Cardiology [phone]        Anticoagulation Patient Findings    HPI:  Laurie Adams, on anticoagulation therapy with warfarin for TIA  Changes to current medical/health status since last appt: Pt had total joint replacement, pt did not bridge.  Denies signs/symptoms of bleeding and/or thrombosis since the last appt.    Denies any interval changes to diet  Denies any interval changes to medications since last appt.   Denies any complications or cost restrictions with current therapy.     A/P   INR  SUB-therapeutic.   INR was from 4 days ago, likely higher today. Requested pt to obtain next INR ASAP.    Next INR in 1 day.     Tyree Helton, MarinD

## 2018-06-15 LAB — INR PPP: 1 (ref 2–3.5)

## 2018-06-16 DIAGNOSIS — I10 ESSENTIAL HYPERTENSION: ICD-10-CM

## 2018-06-18 ENCOUNTER — ANTICOAGULATION MONITORING (OUTPATIENT)
Dept: VASCULAR LAB | Facility: MEDICAL CENTER | Age: 78
End: 2018-06-18

## 2018-06-18 DIAGNOSIS — G45.9 TRANSIENT CEREBRAL ISCHEMIA, UNSPECIFIED TYPE: ICD-10-CM

## 2018-06-18 DIAGNOSIS — I48.91 ATRIAL FIBRILLATION, UNSPECIFIED TYPE (HCC): ICD-10-CM

## 2018-06-18 RX ORDER — LISINOPRIL 5 MG/1
TABLET ORAL
Qty: 180 TAB | Refills: 3 | Status: SHIPPED | OUTPATIENT
Start: 2018-06-18 | End: 2019-03-26 | Stop reason: SDUPTHER

## 2018-06-18 NOTE — PROGRESS NOTES
OP Telephone Anticoagulation Service Note    Date: 6/18/2018      Anticoagulation Summary  As of 6/18/2018    INR goal:   2.0-3.0   TTR:   63.7 % (7.9 mo)   Today's INR:   1.0! (6/15/2018)   Warfarin maintenance plan:   7.5 mg (5 mg x 1.5) on Sun, Thu, Sat; 5 mg (5 mg x 1) all other days   Weekly warfarin total:   42.5 mg   Plan last modified:   Marin BurchD (2/1/2018)   Next INR check:   6/21/2018   Target end date:   Indefinite    Indications    TIA (transient ischemic attack) [G45.9]  Atrial fibrillation (CMS-HCC) [I48.91] [I48.91]             Anticoagulation Episode Summary     INR check location:   Outside Lab    Preferred lab:       Send INR reminders to:       Comments:   [facility]      Anticoagulation Care Providers     Provider Role Specialty Phone number    Darlene Moore M.D.  Cardiology [phone]        Anticoagulation Patient Findings      Plan: Sub-therapeutic INR 1.0 on 6/15/2018.     Spoke with patient on the phone about today's sub-therapeutic INR. Confirmed dosing. No missed tablets in the last week. Patient denies any changes in medications or diet; during phone call could not determine any reason for low INR. Patient denies any signs or symptoms of bleeding or clotting. Instructed patient to call clinic if any unusual bleeding or bruising occurs. Per Yessica, instructed patient to take 2 tablets (10 mg) today and tomorrow then continue normal weekly dose as outlined above. Will follow-up with patient in 3 days, June 21, 2018.       Shayy Marti, Pharmacy Intern  I have reviewed and agree with the plan above on  6/18/2018    Yessica Nuñez, Pharm D

## 2018-06-21 ENCOUNTER — ANTICOAGULATION MONITORING (OUTPATIENT)
Dept: VASCULAR LAB | Facility: MEDICAL CENTER | Age: 78
End: 2018-06-21

## 2018-06-21 DIAGNOSIS — I48.91 ATRIAL FIBRILLATION, UNSPECIFIED TYPE (HCC): ICD-10-CM

## 2018-06-21 LAB — INR PPP: 2.3 (ref 2–3.5)

## 2018-06-21 NOTE — DISCHARGE SUMMARY
Date of Admission: 6/7/18  Date of Discharge: 6/8/18    Preoperative diagnosis: Right Knee DJD  Postoperative diagnosis: Right Knee DJD    Procedure performed: Right Total Knee Arthoplasty    Surgeon and Attending Physician: Dr. Eric Bunn    Complications: None    Discharge Condition: The patient will be discharged home in stable condition and good pain relief.     Clinical History: Laurie is a 77 year old female who presented with a chief complaint of Right knee pain. The patient presented with advanced knee DJD. The patient tried and failed extensive conservative treatments and elected to undergo a Total knee arthroplasty.     Hospital Course: The patient was taken to the orthopedic unit following the procedure. The patient was in stable condition throughout the entire hospital stay. On Post op Day number 2 the patient was ambulatory safely in the abraham. The patient was cleared by physical therapy and occupational therapy prior to discharge. The patient was voiding, tolerating a regular diet, and had no complaints of nausea, vomiting, shortness of breath or chest pain. The patient was distally neurovascularly intact in bilateral lower extremities. Plantar flexion and dorsiflexion was 5/5 in strength. The incision was clean, dry and approximated, without signs of infection. Post op labs and vital signs remained stable. There were no complaints of calf pain to palpation, redness, heat or edema.     Discharge instructions:  1. To follow up with Dr. Bunn in 7-10 days  2. To monitor the incision closely, and call for symptoms of infection. Keep the incision clean, dry and covered. Ok to shower, but cover with saran wrap and tape. Change the dressing as needed.   3. Monitor for signs of DVT and call if present.   4. Use over-the-counter stool softeners or laxatives as needed for constipation.  5. Wean off narcotics as tolerated.   6. Weight Bearing as tolerated  7. To start outpatient as scheduled, or within 7 days.      Discharge medications:  1. Aspirin 325 mg 1 tab PO BID x 14 days.   2. Norco 10/325mg 1 tab PO Q 4 hours prn pain #84 NR  3. Warfarin - resume home routine    Thank you so much, we expect for this patient to do well.

## 2018-06-21 NOTE — PROGRESS NOTES
OP Telephone Anticoagulation Service Note    Date: 6/21/2018      Anticoagulation Summary  As of 6/21/2018    INR goal:   2.0-3.0   TTR:   62.7 % (8.1 mo)   Today's INR:   2.3   Warfarin maintenance plan:   7.5 mg (5 mg x 1.5) on Sun, Thu, Sat; 5 mg (5 mg x 1) all other days   Weekly warfarin total:   42.5 mg   Plan last modified:   Estee Mustafa PharmD (2/1/2018)   Next INR check:   6/28/2018   Target end date:   Indefinite    Indications    TIA (transient ischemic attack) [G45.9]  Atrial fibrillation (CMS-HCC) [I48.91] [I48.91]             Anticoagulation Episode Summary     INR check location:   Outside Lab    Preferred lab:       Send INR reminders to:       Comments:   [facility]      Anticoagulation Care Providers     Provider Role Specialty Phone number    Darlene Moore M.D.  Cardiology [phone]        Anticoagulation Patient Findings      Plan: Therapeutic INR 2.3 on 6/21/2018.     Spoke with patient on the phone about today's therapeutic INR. Confirmed dosing. No missed tablets in the last week. Patient denies any changes in medications or diet; patient is still recovering from knee surgery and states her  is cooking for her, so her diet is a little different than normal but she is doing her best to keep it consistant. Patient denies any signs or symptoms of bleeding or clotting. Instructed patient to call clinic if any unusual bleeding or bruising occurs. Will continue dosing as outlined above. Will follow-up with patient in 1 week(s), June 28, 2018.       Shayy Marti, Pharmacy Intern      I have reviewed and agree with the plan above on 06/21/2018      Virginia Mcarthur PharmD

## 2018-06-29 LAB — INR PPP: 3.8 (ref 2–3.5)

## 2018-07-02 ENCOUNTER — ANTICOAGULATION MONITORING (OUTPATIENT)
Dept: VASCULAR LAB | Facility: MEDICAL CENTER | Age: 78
End: 2018-07-02

## 2018-07-02 DIAGNOSIS — G45.9 TRANSIENT CEREBRAL ISCHEMIA, UNSPECIFIED TYPE: ICD-10-CM

## 2018-07-02 DIAGNOSIS — I48.91 ATRIAL FIBRILLATION, UNSPECIFIED TYPE (HCC): ICD-10-CM

## 2018-07-02 NOTE — PROGRESS NOTES
Anticoagulation Summary  As of 7/2/2018    INR goal:   2.0-3.0   TTR:   62.2 % (8.4 mo)   Today's INR:   3.8! (6/29/2018)   Warfarin maintenance plan:   7.5 mg (5 mg x 1.5) on Sun, Thu, Sat; 5 mg (5 mg x 1) all other days   Weekly warfarin total:   42.5 mg   Plan last modified:   Marin BurchD (2/1/2018)   Next INR check:   7/9/2018   Target end date:   Indefinite    Indications    TIA (transient ischemic attack) [G45.9]  Atrial fibrillation (CMS-HCC) [I48.91] [I48.91]             Anticoagulation Episode Summary     INR check location:   Outside Lab    Preferred lab:       Send INR reminders to:       Comments:   [facility]      Anticoagulation Care Providers     Provider Role Specialty Phone number    Virginia Mcarthur, PharmD       Narciso Contreras M.D.  Cardiology [phone]        Anticoagulation Patient Findings        Spoke to patient on the phone.   INR  supra-therapeutic.   Denies signs/symptoms of bleeding and/or thrombosis.   Denies changes to diet or medications.   Follow up appointment in 1 week(s).    Hold tonight then continue weekly warfarin dose as noted      George Villar, PharmD

## 2018-07-09 ENCOUNTER — PATIENT OUTREACH (OUTPATIENT)
Dept: HEALTH INFORMATION MANAGEMENT | Facility: OTHER | Age: 78
End: 2018-07-09

## 2018-07-13 ENCOUNTER — ANTICOAGULATION MONITORING (OUTPATIENT)
Dept: VASCULAR LAB | Facility: MEDICAL CENTER | Age: 78
End: 2018-07-13

## 2018-07-13 DIAGNOSIS — G45.9 TRANSIENT CEREBRAL ISCHEMIA, UNSPECIFIED TYPE: ICD-10-CM

## 2018-07-13 DIAGNOSIS — I48.91 ATRIAL FIBRILLATION, UNSPECIFIED TYPE (HCC): ICD-10-CM

## 2018-07-13 LAB — INR PPP: 2.3 (ref 2–3.5)

## 2018-07-14 DIAGNOSIS — I48.0 PAF (PAROXYSMAL ATRIAL FIBRILLATION) (HCC): ICD-10-CM

## 2018-07-14 NOTE — PROGRESS NOTES
Anticoagulation Summary  As of 7/13/2018    INR goal:   2.0-3.0   TTR:   61.4 % (8.9 mo)   Today's INR:   2.3   Warfarin maintenance plan:   7.5 mg (5 mg x 1.5) on Sun, Thu, Sat; 5 mg (5 mg x 1) all other days   Weekly warfarin total:   42.5 mg   No change documented:   Deb Pandya   Plan last modified:   Marin BurchD (2/1/2018)   Next INR check:   7/20/2018   Target end date:   Indefinite    Indications    TIA (transient ischemic attack) [G45.9]  Atrial fibrillation (CMS-HCC) [I48.91] [I48.91]             Anticoagulation Episode Summary     INR check location:   Outside Lab    Preferred lab:       Send INR reminders to:       Comments:   [facility]      Anticoagulation Care Providers     Provider Role Specialty Phone number    Virginia Mcarthur PharmD       Narciso Contreras M.D.  Cardiology [phone]        Anticoagulation Patient Findings  Patient Findings     Negatives:   Signs/symptoms of thrombosis, Signs/symptoms of bleeding, Laboratory test error suspected, Change in health, Change in alcohol use, Change in activity, Upcoming invasive procedure, Emergency department visit, Upcoming dental procedure, Missed doses, Extra doses, Change in medications, Change in diet/appetite, Hospital admission, Bruising, Other complaints        Spoke with the patient on the phone today, reporting a therapeutic INR of 2.3.  Confirmed the current warfarin dosing regimen and patient compliance. Patient denies any interval changes to diet and/or medications. Patient denies any signs/symptoms of bleeding or clotting. Patient will continue with the current warfarin dosing regimen, and will follow up again in 1 week.    Mitzi FunesD

## 2018-07-16 ENCOUNTER — ANTICOAGULATION MONITORING (OUTPATIENT)
Dept: VASCULAR LAB | Facility: MEDICAL CENTER | Age: 78
End: 2018-07-16

## 2018-07-16 DIAGNOSIS — I48.91 ATRIAL FIBRILLATION, UNSPECIFIED TYPE (HCC): ICD-10-CM

## 2018-07-16 DIAGNOSIS — G45.9 TRANSIENT CEREBRAL ISCHEMIA, UNSPECIFIED TYPE: ICD-10-CM

## 2018-07-16 RX ORDER — FLECAINIDE ACETATE 100 MG/1
TABLET ORAL
Qty: 180 TAB | Refills: 3 | Status: SHIPPED | OUTPATIENT
Start: 2018-07-16 | End: 2019-03-26 | Stop reason: SDUPTHER

## 2018-07-17 NOTE — PROGRESS NOTES
Patient Laurie Adams was an elective admission to [facility] on 6/7/18 for total knee arthroplasty.  The patient was discharged on 6/9/18 and instructed to follow up with her surgeon and PCP.  The patient successfully filled her discharge medication. The patient began physical therapy on 6/14/18 and followed up with her surgeon on 6/22/18.  The patient stated that her PCP no longer takes her insurance.  Patient advocate offered to assist the patient with finding a new PCP, however, the patient is choosing to wait until she has recovered from her knee surgery to do this.

## 2018-07-20 LAB — INR PPP: 2.7 (ref 2–3.5)

## 2018-07-23 ENCOUNTER — ANTICOAGULATION MONITORING (OUTPATIENT)
Dept: VASCULAR LAB | Facility: MEDICAL CENTER | Age: 78
End: 2018-07-23

## 2018-07-23 DIAGNOSIS — I48.91 ATRIAL FIBRILLATION, UNSPECIFIED TYPE (HCC): ICD-10-CM

## 2018-07-23 DIAGNOSIS — G45.9 TRANSIENT CEREBRAL ISCHEMIA, UNSPECIFIED TYPE: ICD-10-CM

## 2018-07-23 NOTE — PROGRESS NOTES
Anticoagulation Summary  As of 7/23/2018    INR goal:   2.0-3.0   TTR:   62.4 % (9.1 mo)   Today's INR:   2.7 (7/20/2018)   Warfarin maintenance plan:   7.5 mg (5 mg x 1.5) on Sun, Thu, Sat; 5 mg (5 mg x 1) all other days   Weekly warfarin total:   42.5 mg   Plan last modified:   Estee Mustafa PharmD (2/1/2018)   Next INR check:   8/6/2018   Target end date:   Indefinite    Indications    TIA (transient ischemic attack) [G45.9]  Atrial fibrillation (CMS-HCC) [I48.91] [I48.91]             Anticoagulation Episode Summary     INR check location:   Outside Lab    Preferred lab:       Send INR reminders to:       Comments:   [facility]      Anticoagulation Care Providers     Provider Role Specialty Phone number    Darlene Moore M.D.  Cardiology [phone]        Anticoagulation Patient Findings  Patient Findings     Negatives:   Signs/symptoms of thrombosis, Signs/symptoms of bleeding, Laboratory test error suspected, Change in health, Change in alcohol use, Change in activity, Upcoming invasive procedure, Emergency department visit, Upcoming dental procedure, Missed doses, Extra doses, Change in medications, Change in diet/appetite, Hospital admission, Bruising, Other complaints        Spoke with patient today regarding therapeutic INR of 2.7.  Patient denies any signs/symptoms of bruising or bleeding or any changes in diet and medications.  Instructed patient to call clinic with any questions or concerns.  Pt is to continue with current warfarin dosing regimen.  Follow up in 2 weeks, to reduce risk of adverse events related to this high risk medication,  Warfarin.    Tawanda Vivas, MarinD

## 2018-08-09 ENCOUNTER — ANTICOAGULATION MONITORING (OUTPATIENT)
Dept: VASCULAR LAB | Facility: MEDICAL CENTER | Age: 78
End: 2018-08-09

## 2018-08-09 LAB — INR PPP: 2.7 (ref 2–3.5)

## 2018-08-09 NOTE — PROGRESS NOTES
OP Anticoagulation Service Note  Anticoagulation Summary  As of 8/9/2018    INR goal:   2.0-3.0   TTR:   64.9 % (9.8 mo)   Today's INR:   2.7   Warfarin maintenance plan:   7.5 mg (5 mg x 1.5) on Sun, Thu, Sat; 5 mg (5 mg x 1) all other days   Weekly warfarin total:   42.5 mg   No change documented:   Ezequiel Suh Ass't   Plan last modified:   Marin BurchD (2/1/2018)   Next INR check:   9/6/2018   Target end date:   Indefinite    Indications    TIA (transient ischemic attack) [G45.9]  Atrial fibrillation (CMS-HCC) [I48.91] [I48.91]             Anticoagulation Episode Summary     INR check location:   Outside Lab    Preferred lab:       Send INR reminders to:       Comments:   [facility]      Anticoagulation Care Providers     Provider Role Specialty Phone number    Darlene Moore M.D.  Cardiology [phone]        Anticoagulation Patient Findings  Patient Findings     Negatives:   Signs/symptoms of thrombosis, Signs/symptoms of bleeding, Laboratory test error suspected, Change in health, Change in alcohol use, Change in activity, Upcoming invasive procedure, Emergency department visit, Upcoming dental procedure, Missed doses, Extra doses, Change in medications, Change in diet/appetite, Hospital admission, Bruising, Other complaints        Spoke with Laurie to report a therapeutic INR of  2.7.  Pt is to continue with current warfarin dosing regimen.  Pt denies any unusual s/s of bleeding, bruising, clotting or any changes to diet or medications.  Follow up in 3 weeks, to reduce risk of adverse events related to this high risk medication,  Warfarin.    Virginia Mcarthur PharmD

## 2018-08-13 DIAGNOSIS — Z79.01 CHRONIC ANTICOAGULATION: ICD-10-CM

## 2018-08-13 DIAGNOSIS — I48.91 ATRIAL FIBRILLATION, UNSPECIFIED TYPE (HCC): ICD-10-CM

## 2018-09-27 DIAGNOSIS — G45.9 TRANSIENT CEREBRAL ISCHEMIA, UNSPECIFIED TYPE: ICD-10-CM

## 2018-09-27 DIAGNOSIS — I48.0 PAF (PAROXYSMAL ATRIAL FIBRILLATION) (HCC): ICD-10-CM

## 2018-09-27 RX ORDER — WARFARIN SODIUM 5 MG/1
TABLET ORAL
Qty: 135 TAB | Refills: 1 | Status: SHIPPED | OUTPATIENT
Start: 2018-09-27 | End: 2019-03-26 | Stop reason: SDUPTHER

## 2018-10-04 ENCOUNTER — ANTICOAGULATION MONITORING (OUTPATIENT)
Dept: VASCULAR LAB | Facility: MEDICAL CENTER | Age: 78
End: 2018-10-04

## 2018-10-04 DIAGNOSIS — I48.91 ATRIAL FIBRILLATION, UNSPECIFIED TYPE (HCC): ICD-10-CM

## 2018-10-04 DIAGNOSIS — G45.9 TIA (TRANSIENT ISCHEMIC ATTACK): ICD-10-CM

## 2018-10-04 LAB — INR PPP: 2.1 (ref 2–3.5)

## 2018-10-05 NOTE — PROGRESS NOTES
OP Anticoagulation Telephone Note    Date: 10/5/2018  Anticoagulation Summary  As of 10/4/2018    INR goal:   2.0-3.0   TTR:   70.6 % (11.6 mo)   Today's INR:   2.1   Warfarin maintenance plan:   7.5 mg (5 mg x 1.5) on Sun, Thu, Sat; 5 mg (5 mg x 1) all other days   Weekly warfarin total:   42.5 mg   No change documented:   Helena Torres, Med Ass't   Plan last modified:   Estee Mustafa, PharmD (2/1/2018)   Next INR check:   11/1/2018   Target end date:   Indefinite    Indications    TIA (transient ischemic attack) [G45.9]  Atrial fibrillation (CMS-HCC) [I48.91] [I48.91]             Anticoagulation Episode Summary     INR check location:   Outside Lab    Preferred lab:       Send INR reminders to:       Comments:   [facility]      Anticoagulation Care Providers     Provider Role Specialty Phone number    Narciso Contreras M.D.  Cardiology [phone]    Lynne Doyle A.P.N. Responsible Cardiology [phone]    AISHWARYA BartonPPAIGE Responsible Cardiology [phone]    [facility]ticoagulation [facility] Responsible  [phone]        Anticoagulation Patient Findings  Patient Findings     Negatives:   Signs/symptoms of thrombosis, Signs/symptoms of bleeding, Laboratory test error suspected, Change in health, Change in alcohol use, Change in activity, Upcoming invasive procedure, Emergency department visit, Upcoming dental procedure, Missed doses, Extra doses, Change in medications, Change in diet/appetite, Hospital admission, Bruising, Other complaints      Plan:  Spoke with patient on the phone. Patient is therapeutic today. Patient denies any changes in medications or diet. Patient denies any signs or symptoms of bleeding or clotting. Instructed patient to call clinic if any unusual bleeding or bruising occurs. Will continue dosing as outlined above. Will follow-up with patient in 4 weeks.    Helena Torres, Medical Assistant    10/05/18  Cosignature - Estee Mustafa, PharmD

## 2018-11-07 ENCOUNTER — ANTICOAGULATION MONITORING (OUTPATIENT)
Dept: VASCULAR LAB | Facility: MEDICAL CENTER | Age: 78
End: 2018-11-07

## 2018-11-07 DIAGNOSIS — I48.91 ATRIAL FIBRILLATION, UNSPECIFIED TYPE (HCC): ICD-10-CM

## 2018-11-07 DIAGNOSIS — G45.9 TIA (TRANSIENT ISCHEMIC ATTACK): ICD-10-CM

## 2018-11-07 LAB — INR PPP: 3 (ref 2–3.5)

## 2018-11-07 NOTE — PROGRESS NOTES
Anticoagulation Summary  As of 11/7/2018    INR goal:   2.0-3.0   TTR:   73.2 % (1 y)   Today's INR:   3.0   Warfarin maintenance plan:   7.5 mg (5 mg x 1.5) on Sun, Thu, Sat; 5 mg (5 mg x 1) all other days   Weekly warfarin total:   42.5 mg   Plan last modified:   Estee Mustafa, Darlene (2/1/2018)   Next INR check:   12/19/2018   Target end date:   Indefinite    Indications    TIA (transient ischemic attack) [G45.9]  Atrial fibrillation (CMS-HCC) [I48.91] [I48.91]             Anticoagulation Episode Summary     INR check location:   Outside Lab    Preferred lab:       Send INR reminders to:       Comments:   [facility]      Anticoagulation Care Providers     Provider Role Specialty Phone number    Narciso Contreras M.D.  Cardiology [phone]    AISHWARYA MuellerP.N. Responsible Cardiology [phone]    AISHWARYA BartonP.NShaan Responsible Cardiology [phone]    [facility] Anticoagulation [facility] Responsible  [phone]        Anticoagulation Patient Findings      Spoke with patient to report a therapeutic INR.    Pt instructed to continue with current warfarin dosing regimen, confirms dosing.   Pt denies any s/s of bleeding, bruising, clotting or any changes to diet or medication.    Will follow up in 6 week(s).     Estee Mustafa, MarinD

## 2018-12-13 ENCOUNTER — ANTICOAGULATION MONITORING (OUTPATIENT)
Dept: VASCULAR LAB | Facility: MEDICAL CENTER | Age: 78
End: 2018-12-13

## 2018-12-13 DIAGNOSIS — I48.91 ATRIAL FIBRILLATION, UNSPECIFIED TYPE (HCC): ICD-10-CM

## 2018-12-13 DIAGNOSIS — G45.9 TIA (TRANSIENT ISCHEMIC ATTACK): ICD-10-CM

## 2018-12-13 LAB — INR PPP: 1.9 (ref 2–3.5)

## 2018-12-14 NOTE — PROGRESS NOTES
Anticoagulation Summary  As of 12/13/2018    INR goal:   2.0-3.0   TTR:   74.7 % (1.1 y)   Today's INR:   1.9!   Warfarin maintenance plan:   7.5 mg (5 mg x 1.5) on Sun, Thu, Sat; 5 mg (5 mg x 1) all other days   Weekly warfarin total:   42.5 mg   Plan last modified:   Estee Mustafa, PharmD (2/1/2018)   Next INR check:   1/10/2019   Target end date:   Indefinite    Indications    TIA (transient ischemic attack) [G45.9]  Atrial fibrillation (CMS-HCC) [I48.91] [I48.91]             Anticoagulation Episode Summary     INR check location:   Outside Lab    Preferred lab:       Send INR reminders to:       Comments:   [facility]      Anticoagulation Care Providers     Provider Role Specialty Phone number    Narciso Contreras M.D.  Cardiology [phone]    Lynne Doyle A.P.N. Responsible Cardiology [phone]    Blaire Crowley A.P.NShaan Responsible Cardiology [phone]    [facility] Anticoagulation Services Responsible  [phone]        Anticoagulation Patient Findings    HPI:  Laurie Adams, on anticoagulation therapy with warfarin for TIA.  Changes to current medical/health status since last appt: none  Denies signs/symptoms of bleeding and/or thrombosis since the last appt.    Denies any interval changes to diet  Denies any interval changes to medications since last appt.   Denies any complications or cost restrictions with current therapy.     A/P   INR  SUB-therapeutic.   Per Chest guidelines, no change for INR of 1.9.    Pt is to continue with current warfarin dosing regimen.     Next INR in 4 week(s).    Tyree Helton, PharmD

## 2019-01-29 ENCOUNTER — ANTICOAGULATION MONITORING (OUTPATIENT)
Dept: VASCULAR LAB | Facility: MEDICAL CENTER | Age: 79
End: 2019-01-29

## 2019-01-29 DIAGNOSIS — I48.91 ATRIAL FIBRILLATION, UNSPECIFIED TYPE (HCC): ICD-10-CM

## 2019-01-29 DIAGNOSIS — G45.9 TIA (TRANSIENT ISCHEMIC ATTACK): ICD-10-CM

## 2019-01-29 LAB — INR PPP: 3.3 (ref 2–3.5)

## 2019-01-30 NOTE — PROGRESS NOTES
Anticoagulation Summary  As of 1/29/2019    INR goal:   2.0-3.0   TTR:   74.4 % (1.3 y)   Today's INR:   3.3!   Warfarin maintenance plan:   7.5 mg (5 mg x 1.5) on Sun, Thu, Sat; 5 mg (5 mg x 1) all other days   Weekly warfarin total:   42.5 mg   Plan last modified:   Estee Mustafa, PharmD (2/1/2018)   Next INR check:   2/19/2019   Target end date:   Indefinite    Indications    TIA (transient ischemic attack) [G45.9]  Atrial fibrillation (CMS-HCC) [I48.91] [I48.91]             Anticoagulation Episode Summary     INR check location:   Outside Lab    Preferred lab:       Send INR reminders to:       Comments:   [facility]      Anticoagulation Care Providers     Provider Role Specialty Phone number    Narciso Contreras M.D.  Cardiology [phone]    JEANNA Mueller.P.N. Responsible Cardiology [phone]    AISHWARYA BartonP.NShaan Responsible Cardiology [phone]    [facility] Anticoagulation Services Responsible  [phone]        Anticoagulation Patient Findings    Spoke with Laurie to report to report a supra therapeutic INR of 3.3. Will reduce tonight's dose to 2.5mg. Continue current dosing regimen.  Follow up in 4 weeks, to reduce the risk of adverse events related to this high risk medication, warfarin.    Virginia Mcarthur, Clinical Pharmacist

## 2019-02-20 DIAGNOSIS — E78.5 DYSLIPIDEMIA, GOAL LDL BELOW 100: ICD-10-CM

## 2019-02-20 DIAGNOSIS — Z79.01 CHRONIC ANTICOAGULATION: ICD-10-CM

## 2019-02-20 DIAGNOSIS — I48.91 ATRIAL FIBRILLATION, UNSPECIFIED TYPE (HCC): ICD-10-CM

## 2019-02-20 DIAGNOSIS — E03.9 HYPOTHYROIDISM, UNSPECIFIED TYPE: ICD-10-CM

## 2019-02-20 DIAGNOSIS — I47.10 PAROXYSMAL SUPRAVENTRICULAR TACHYCARDIA: ICD-10-CM

## 2019-02-20 DIAGNOSIS — E78.5 HYPERLIPIDEMIA, UNSPECIFIED HYPERLIPIDEMIA TYPE: ICD-10-CM

## 2019-02-20 DIAGNOSIS — E11.22 TYPE 2 DIABETES MELLITUS WITH DIABETIC CHRONIC KIDNEY DISEASE, UNSPECIFIED CKD STAGE, UNSPECIFIED WHETHER LONG TERM INSULIN USE (HCC): ICD-10-CM

## 2019-02-20 RX ORDER — ATORVASTATIN CALCIUM 40 MG/1
40 TABLET, FILM COATED ORAL DAILY
Qty: 90 TAB | Refills: 0 | Status: SHIPPED | OUTPATIENT
Start: 2019-02-20 | End: 2019-03-26 | Stop reason: SDUPTHER

## 2019-03-11 ENCOUNTER — ANTICOAGULATION MONITORING (OUTPATIENT)
Dept: VASCULAR LAB | Facility: MEDICAL CENTER | Age: 79
End: 2019-03-11

## 2019-03-11 DIAGNOSIS — I48.91 ATRIAL FIBRILLATION, UNSPECIFIED TYPE (HCC): ICD-10-CM

## 2019-03-11 DIAGNOSIS — G45.9 TIA (TRANSIENT ISCHEMIC ATTACK): ICD-10-CM

## 2019-03-11 LAB — INR PPP: 2.5 (ref 2–3.5)

## 2019-03-11 NOTE — PROGRESS NOTES
Anticoagulation Summary  As of 3/11/2019    INR goal:   2.0-3.0   TTR:   73.4 % (1.4 y)   INR used for dosin.5 (3/11/2019)   Warfarin maintenance plan:   7.5 mg (5 mg x 1.5) every Sun, Thu, Sat; 5 mg (5 mg x 1) all other days   Weekly warfarin total:   42.5 mg   Plan last modified:   Estee Mustafa, PharmD (2018)   Next INR check:   4/15/2019   Target end date:   Indefinite    Indications    TIA (transient ischemic attack) [G45.9]  Atrial fibrillation (CMS-HCC) [I48.91] [I48.91]             Anticoagulation Episode Summary     INR check location:   Outside Lab    Preferred lab:       Send INR reminders to:       Comments:   [facility]      Anticoagulation Care Providers     Provider Role Specialty Phone number    Narciso Contreras M.D.  Cardiology [phone]    AISHWARYA MuellerP.N. Responsible Cardiology [phone]    AISHWARYA BartonP.NShaan Responsible Cardiology [phone]    [facility] Anticoagulation Services Responsible  [phone]        Anticoagulation Patient Findings     Left voicemail message to report a therapeutic INR of 2.5.  Pt to continue with current warfarin dosing regimen. Requested pt contact the clinic for any s/s of unusual bleeding, bruising, clotting or any changes to diet or medication. FU 5 weeks.  Tawanda Vivas, MarinD

## 2019-03-14 LAB — HBA1C MFR BLD: 5.7 % (ref ?–5.8)

## 2019-03-15 DIAGNOSIS — I48.91 ATRIAL FIBRILLATION, UNSPECIFIED TYPE (HCC): ICD-10-CM

## 2019-03-15 DIAGNOSIS — E78.5 HYPERLIPIDEMIA, UNSPECIFIED HYPERLIPIDEMIA TYPE: ICD-10-CM

## 2019-03-15 DIAGNOSIS — E11.22 TYPE 2 DIABETES MELLITUS WITH DIABETIC CHRONIC KIDNEY DISEASE, UNSPECIFIED CKD STAGE, UNSPECIFIED WHETHER LONG TERM INSULIN USE (HCC): ICD-10-CM

## 2019-03-15 DIAGNOSIS — E03.9 HYPOTHYROIDISM, UNSPECIFIED TYPE: ICD-10-CM

## 2019-03-26 ENCOUNTER — OFFICE VISIT (OUTPATIENT)
Dept: CARDIOLOGY | Facility: MEDICAL CENTER | Age: 79
End: 2019-03-26
Payer: MEDICARE

## 2019-03-26 VITALS
BODY MASS INDEX: 34.52 KG/M2 | OXYGEN SATURATION: 91 % | HEIGHT: 64 IN | HEART RATE: 72 BPM | WEIGHT: 202.2 LBS | SYSTOLIC BLOOD PRESSURE: 140 MMHG | DIASTOLIC BLOOD PRESSURE: 88 MMHG

## 2019-03-26 DIAGNOSIS — I48.0 PAF (PAROXYSMAL ATRIAL FIBRILLATION) (HCC): ICD-10-CM

## 2019-03-26 DIAGNOSIS — G45.9 TRANSIENT CEREBRAL ISCHEMIA, UNSPECIFIED TYPE: ICD-10-CM

## 2019-03-26 DIAGNOSIS — E78.5 HYPERLIPIDEMIA, UNSPECIFIED HYPERLIPIDEMIA TYPE: ICD-10-CM

## 2019-03-26 DIAGNOSIS — I10 ESSENTIAL HYPERTENSION: ICD-10-CM

## 2019-03-26 DIAGNOSIS — I48.0 PAROXYSMAL ATRIAL FIBRILLATION (HCC): ICD-10-CM

## 2019-03-26 PROCEDURE — 99214 OFFICE O/P EST MOD 30 MIN: CPT | Performed by: INTERNAL MEDICINE

## 2019-03-26 RX ORDER — ATORVASTATIN CALCIUM 40 MG/1
TABLET, FILM COATED ORAL
COMMUNITY
End: 2019-03-26

## 2019-03-26 RX ORDER — ATORVASTATIN CALCIUM 40 MG/1
40 TABLET, FILM COATED ORAL DAILY
Qty: 90 TAB | Refills: 3 | Status: SHIPPED | OUTPATIENT
Start: 2019-03-26 | End: 2020-03-16

## 2019-03-26 RX ORDER — OXYCODONE AND ACETAMINOPHEN 7.5; 325 MG/1; MG/1
TABLET ORAL
COMMUNITY
End: 2019-03-26

## 2019-03-26 RX ORDER — METOPROLOL SUCCINATE 100 MG/1
TABLET, EXTENDED RELEASE ORAL
COMMUNITY
End: 2019-03-26

## 2019-03-26 RX ORDER — AZITHROMYCIN 250 MG/1
TABLET, FILM COATED ORAL
COMMUNITY
End: 2019-03-26

## 2019-03-26 RX ORDER — AMOXICILLIN 500 MG/1
CAPSULE ORAL
COMMUNITY
Start: 2019-02-14 | End: 2021-05-04

## 2019-03-26 RX ORDER — FLECAINIDE ACETATE 50 MG/1
TABLET ORAL
COMMUNITY
End: 2019-03-26

## 2019-03-26 RX ORDER — WARFARIN SODIUM 5 MG/1
TABLET ORAL
Qty: 135 TAB | Refills: 1 | Status: SHIPPED | OUTPATIENT
Start: 2019-03-26 | End: 2020-01-07

## 2019-03-26 RX ORDER — METOPROLOL SUCCINATE 100 MG/1
100 TABLET, EXTENDED RELEASE ORAL DAILY
Qty: 90 TAB | Refills: 3 | Status: SHIPPED | OUTPATIENT
Start: 2019-03-26 | End: 2020-03-16

## 2019-03-26 RX ORDER — WARFARIN SODIUM 5 MG/1
TABLET ORAL
COMMUNITY
End: 2019-03-26

## 2019-03-26 RX ORDER — IBUPROFEN 800 MG/1
TABLET ORAL
COMMUNITY
End: 2019-03-26

## 2019-03-26 RX ORDER — MELOXICAM 15 MG/1
TABLET ORAL
COMMUNITY
End: 2019-03-26

## 2019-03-26 RX ORDER — FLECAINIDE ACETATE 100 MG/1
TABLET ORAL
COMMUNITY
End: 2019-03-26

## 2019-03-26 RX ORDER — FLECAINIDE ACETATE 100 MG/1
TABLET ORAL
Qty: 180 TAB | Refills: 3 | Status: SHIPPED | OUTPATIENT
Start: 2019-03-26 | End: 2020-04-16

## 2019-03-26 RX ORDER — ESTRADIOL 1 MG/1
TABLET ORAL
COMMUNITY
End: 2019-03-26

## 2019-03-26 RX ORDER — TRAMADOL HYDROCHLORIDE 50 MG/1
TABLET ORAL
COMMUNITY
End: 2019-03-26

## 2019-03-26 RX ORDER — BACLOFEN 10 MG/1
TABLET ORAL
COMMUNITY
End: 2019-03-26

## 2019-03-26 RX ORDER — SERTRALINE HYDROCHLORIDE 100 MG/1
TABLET, FILM COATED ORAL
COMMUNITY
End: 2019-03-26

## 2019-03-26 RX ORDER — METOCLOPRAMIDE 10 MG/1
TABLET ORAL
COMMUNITY
Start: 2017-05-17 | End: 2019-03-26

## 2019-03-26 RX ORDER — ALBUTEROL SULFATE 90 UG/1
AEROSOL, METERED RESPIRATORY (INHALATION)
COMMUNITY
End: 2020-03-03 | Stop reason: SDUPTHER

## 2019-03-26 RX ORDER — METOPROLOL TARTRATE 50 MG/1
TABLET, FILM COATED ORAL
COMMUNITY
End: 2019-03-26

## 2019-03-26 RX ORDER — OXYCODONE AND ACETAMINOPHEN 10; 325 MG/1; MG/1
TABLET ORAL
COMMUNITY
End: 2019-03-26

## 2019-03-26 RX ORDER — AZELASTINE HCL 205.5 UG/1
SPRAY NASAL
COMMUNITY
End: 2019-03-26

## 2019-03-26 RX ORDER — HYDROCODONE BITARTRATE AND ACETAMINOPHEN 10; 325 MG/1; MG/1
TABLET ORAL
COMMUNITY
End: 2019-03-26

## 2019-03-26 RX ORDER — LISINOPRIL 5 MG/1
TABLET ORAL
Qty: 180 TAB | Refills: 3 | Status: SHIPPED | OUTPATIENT
Start: 2019-03-26 | End: 2021-05-04

## 2019-03-26 RX ORDER — ALBUTEROL SULFATE 90 UG/1
AEROSOL, METERED RESPIRATORY (INHALATION)
COMMUNITY
End: 2019-03-26

## 2019-03-26 RX ORDER — LORATADINE 10 MG/1
TABLET ORAL
COMMUNITY
End: 2019-03-26

## 2019-03-26 RX ORDER — LEVOFLOXACIN 500 MG/1
TABLET, FILM COATED ORAL
COMMUNITY
End: 2019-03-26

## 2019-03-26 RX ORDER — FLUTICASONE PROPIONATE 50 MCG
SPRAY, SUSPENSION (ML) NASAL
COMMUNITY
End: 2019-03-26

## 2019-03-26 NOTE — PROGRESS NOTES
"    Cardiology Initial Consultation Note    Date of note:    3/26/2019    Primary Care Provider: Олег Colin M.D. (Inactive)  Referring Provider: Russel Arita M*     Patient Name: Laurie Adams   YOB: 1940  MRN:              [id number]    Chief Complaint: Follow up Afib    Laurie Adams is a 78 y.o. female  patient presented today for follow-up regarding her atrial fibrillation.  In 2015 she had ovarian surgery at [facility], she was dehydrated in postop.  She developed atrial fibrillation.  At that time echocardiogram performed showed normal ejection fraction no significant valvular abnormalities, mild concentric left ventricular hypertrophy.  She did have intermittent palpitations after that, was placed on flecainide was being followed by Dr. Contreras.  She is here for follow-up.  She has no recurrent palpitations doing well.  Denies chest pain or shortness of breath with exertion.  No specific complaints today.    ROS  Arthritis, back pain, hair loss.  All other systems reviewed and discussed using a comprehensive questionnaire and are negative.     Past medical history, family history, social history, allergies and labs are reviewed and updated as needed as documented below.    Past Medical History:   Diagnosis Date   • Arrhythmia 03/2015    A-fib takes flecainide   • Arthritis     fingers- osteo   • Breath shortness     02 @ 2L NOC   • Bronchial asthma 6/29/2012    Inhalers prn   • Bronchitis    • Cancer (HCC) 2005    squamous cell skin left chest   • Cerebral atherosclerosis    • Cold     Pt had \"head cold 6/1, surgery cristofer from 6/4 to 6/7, pt denies productive cough and SOB   • Dependence on supplemental oxygen     2 liters at night.   • Dizziness and giddiness     Episodic since ~2009   • Dyslipidemia, goal LDL below 100 6/29/2012   • History of tobacco use    • HTN (hypertension) 6/29/2012   • Obesity 6/29/2012   • Pneumonia     2015   • Stroke (HCC) 2010   " 2010-1017, multiple TIA's, generalized weakness   • TIA (transient ischemic attack) 6/29/2012   • Urinary incontinence          Past Surgical History:   Procedure Laterality Date   • KNEE ARTHROPLASTY TOTAL Right 6/7/2018    Procedure: KNEE ARTHROPLASTY TOTAL;  Surgeon: Eric Bunn M.D.;  Location: SURGERY [facility];  Service: Orthopedics   • CATARACT PHACO WITH IOL  6/4/2013    Performed by Noe Jean M.D. at [facility]   • CATARACT PHACO WITH IOL  5/21/2013    Performed by Noe Jean M.D. at [facility]   • SEPTOPLASTY  8/19/2009    Performed by PABLITO LORENZANA at SURGERY [facility]   • SOMNOPLASTY  8/19/2009    Performed by PABLITO LORENZANA at SURGERY [facility]   • ANTROSTOMY  8/19/2009    Performed by PABLITO LORENZANA at SURGERY [facility]   • ETHMOIDECTOMY  8/19/2009    Performed by PABLITO LORENZANA at SURGERY [facility]   • OTHER  2008    sinus   • COLON RESECTION  2007   • CHOLECYSTECTOMY  1988   • APPENDECTOMY  1954   • OTHER  1943    mastoid   • GANGLION EXCISION Bilateral 1970, 1975    left wrist, right finger   • HYSTERECTOMY, VAGINAL     • MASTOIDECTOMY     • PB CHOLECYSTOENTEROSTOMY+VIVIANA-EN-Y     • PB NASAL SCOPY,REPB CSF LEAK,SPHENOID     • WRIST FUSION           Current Outpatient Prescriptions   Medication Sig Dispense Refill   • atorvastatin (LIPITOR) 40 MG Tab Take 1 Tab by mouth every day. 90 Tab 3   • flecainide (TAMBOCOR) 100 MG Tab TAKE ONE TABLET BY MOUTH TWICE DAILY 180 Tab 3   • metoprolol SR (TOPROL XL) 100 MG TABLET SR 24 HR Take 1 Tab by mouth every day. 90 Tab 3   • lisinopril (PRINIVIL) 5 MG Tab TAKE ONE TABLET BY MOUTH TWICE DAILY 180 Tab 3   • warfarin (COUMADIN) 5 MG Tab TAKE 1 TO 1 & 1/2 (ONE TO ONE & ONE-HALF) TABLETS BY MOUTH ONCE DAILY 135 Tab 1   • loratadine (CLARITIN) 10 MG Tab Take 10 mg by mouth every day.     • Multiple Vitamins-Minerals (VISION FORMULA 2 PO) Take  by mouth every day.     •  "levalbuterol (XOPENEX HFA) 45 MCG/ACT inhaler Inhale 1-2 Puffs by mouth. Pt states 1-2 puffs as needed      • SERTRALINE 100 MG TABS Take 100 mg by mouth every day.     • VITAMIN D3 Take 5,000 Units by mouth every day.     • albuterol (VENTOLIN HFA) 108 (90 Base) MCG/ACT Aero Soln inhalation aerosol Ventolin HFA 90 mcg/actuation aerosol inhaler     • amoxicillin (AMOXIL) 500 MG Cap        No current facility-administered medications for this visit.          Allergies   Allergen Reactions   • Feldene [Piroxicam] Photosensitivity   • Sulfa Drugs Hives   • Codeine Anxiety         No family history on file.      Social History     Social History   • Marital status:      Spouse name: N/A   • Number of children: N/A   • Years of education: N/A     Occupational History   • Not on file.     Social History Main Topics   • Smoking status: Former Smoker     Packs/day: 1.00     Years: 40.00     Types: Cigarettes     Quit date: 8/14/1999   • Smokeless tobacco: Never Used   • Alcohol use No   • Drug use: No   • Sexual activity: Yes     Partners: Male     Other Topics Concern   • Not on file     Social History Narrative   • No narrative on file         Physical Exam:  Ambulatory Vitals  Blood pressure 140/88, pulse 72, height 1.626 m (5' 4\"), weight 91.7 kg (202 lb 3.2 oz), SpO2 91 %.   Oxygen Therapy:  Pulse Oximetry: 91 %  BP Readings from Last 4 Encounters:   03/26/19 140/88   06/09/18 153/66   04/26/18 127/64   11/09/17 120/70       Weight/BMI: Body mass index is 34.71 kg/m².  Wt Readings from Last 4 Encounters:   03/26/19 91.7 kg (202 lb 3.2 oz)   06/06/18 96.9 kg (213 lb 10 oz)   04/26/18 95.3 kg (210 lb)   11/09/17 90.7 kg (200 lb)     General: Well appearing and in no apparent distress  Head: atrumatic  Eyes: No conjunctival pallor   ENT: normal external appearance of nose and ears  Neck: JVD absent, carotid bruits absent  Lungs: respiratory sounds  normal, additional breath sounds absent  Heart: Regular rhythm,   " No palpable thrills on palpation, murmurs absent, no rubs,   Lower extremity edema absent.   Pedal pulses normal  Abdomen: soft, non tender, non distended.  Extremities/MSK: no clubbing, no cyanosis  Neurological: normal orientation, Gait normal   Psychiatric: Appropriate affect, intact judgement and insight  Skin: Warm extremities        Lab Data Review:  No results found for: CHOLSTRLTOT, LDL, HDL, TRIGLYCERIDE    Lab Results   Component Value Date/Time    SODIUM 142 2018 02:14 PM    POTASSIUM 3.9 2018 02:14 PM    CHLORIDE 105 2018 02:14 PM    CO2 31 2018 02:14 PM    GLUCOSE 124 (H) 2018 02:14 PM    BUN 33 (H) 2018 02:14 PM    CREATININE 1.28 2018 02:14 PM    CREATININE 0.8 2007 09:45 AM     Lab Results   Component Value Date/Time    ALKPHOSPHAT 78 2006 11:20 AM    ASTSGOT 18 2006 11:20 AM    ALTSGPT 29 2006 11:20 AM    TBILIRUBIN 0.5 2006 11:20 AM      Lab Results   Component Value Date/Time    WBC 9.4 2018 04:27 AM       Cardiac Imaging and Procedures Review:    EKG dated  : My personal interpretation is sinus rhythm, first-degree AV block, QRS duration 100      Medical Decision Makin-year-old female patient with history of hypertension, paroxysmal atrial fibrillation, recurrent transient ischemic attacks, obesity,dyslipidemia presents for regular follow-up.  Her recent laboratory data was reviewed, her hemoglobin A1c is 5.7, LDL was 137, normal CMP.  Renewed Coumadin, flecainide, metoprolol succinate, lisinopril.  Blood pressure reasonably controlled, advised to check at home.  No bleeding issues with Coumadin, tolerating well.  She is requesting her carotids to be checked, given her significant history of recurrent strokes will order carotid ultrasound.  She did not tolerate higher dose of statin in the past secondary to her fall, advised to do lifestyle modifications to get LDL less than 100.    Return in about 6  months (around 9/26/2019).    This note was dictated using Dragon speech recognition software.    Russel RUTHERFORD  Interventional cardiologist  [facility], [facility]  [address] [place] [id number]  Phone: [phone]  Fax: [phone]

## 2019-04-10 DIAGNOSIS — G45.9 TRANSIENT CEREBRAL ISCHEMIA, UNSPECIFIED TYPE: ICD-10-CM

## 2019-05-21 ENCOUNTER — TELEPHONE (OUTPATIENT)
Dept: VASCULAR LAB | Facility: MEDICAL CENTER | Age: 79
End: 2019-05-21

## 2019-05-21 NOTE — TELEPHONE ENCOUNTER
Pt is over due for PT/INR for warfarin monitoring. Called to remind patient to get PT/INR lab done.  Left VM     Yessica Nuñez, Pharm D

## 2019-05-22 ENCOUNTER — ANTICOAGULATION MONITORING (OUTPATIENT)
Dept: VASCULAR LAB | Facility: MEDICAL CENTER | Age: 79
End: 2019-05-22

## 2019-05-22 DIAGNOSIS — G45.9 TIA (TRANSIENT ISCHEMIC ATTACK): ICD-10-CM

## 2019-05-22 LAB — INR PPP: 1.8 (ref 2–3.5)

## 2019-05-22 NOTE — PROGRESS NOTES
Anticoagulation Summary  As of 2019    INR goal:   2.0-3.0   TTR:   73.2 % (1.6 y)   INR used for dosin.80! (2019)   Warfarin maintenance plan:   7.5 mg (5 mg x 1.5) every Sun, Thu, Sat; 5 mg (5 mg x 1) all other days   Weekly warfarin total:   42.5 mg   Plan last modified:   Estee Mustafa, PharmD (2018)   Next INR check:   2019   Target end date:   Indefinite    Indications    TIA (transient ischemic attack) [G45.9]  Atrial fibrillation (CMS-HCC) [I48.91] [I48.91]             Anticoagulation Episode Summary     INR check location:   Outside Lab    Preferred lab:       Send INR reminders to:       Comments:   [facility]      Anticoagulation Care Providers     Provider Role Specialty Phone number    Narciso Contreras M.D.  Cardiology [phone]    AISHWARYA MuellerP.N. Responsible Cardiology [phone]    AISHWARYA BartonP.NShaan Responsible Cardiology [phone]    [facility] Anticoagulation Services Responsible  [phone]        Anticoagulation Patient Findings          HPI:  Laurie Adams, on anticoagulation therapy with warfarin for TIA.   Changes to current medical/health status since last appt: none  Denies signs/symptoms of bleeding and/or thrombosis since the last appt.    Denies any interval changes to diet  Denies any interval changes to medications since last appt.   Denies any complications or cost restrictions with current therapy.     A/P   INR  SUB-therapeutic.   Bolus today then Pt is to continue with current warfarin dosing regimen.     Next INR in 2 week(s).    Tyree Helton, PharmD

## 2019-06-05 ENCOUNTER — ANTICOAGULATION MONITORING (OUTPATIENT)
Dept: VASCULAR LAB | Facility: MEDICAL CENTER | Age: 79
End: 2019-06-05

## 2019-06-05 DIAGNOSIS — G45.9 TIA (TRANSIENT ISCHEMIC ATTACK): ICD-10-CM

## 2019-06-05 LAB — INR PPP: 2.4 (ref 2–3.5)

## 2019-06-05 NOTE — PROGRESS NOTES
Tried to call pt x2 regarding therapeutic INR. Number busy, will have to try again later.     INR   Date Value Ref Range Status   06/05/2019 2.40  Final     Comment:     [facility]     No results found for: PAM Mustafa, PharmD

## 2019-06-06 NOTE — PROGRESS NOTES
OP Telephone Anticoagulation Service Note    Date: 2019      Anticoagulation Summary  As of 2019    INR goal:   2.0-3.0   TTR:   73.0 % (1.6 y)   INR used for dosin.40 (2019)   Warfarin maintenance plan:   7.5 mg (5 mg x 1.5) every Sun, Thu, Sat; 5 mg (5 mg x 1) all other days   Weekly warfarin total:   42.5 mg   Plan last modified:   Marin BurchD (2018)   Next INR check:   2019   Target end date:   Indefinite    Indications    TIA (transient ischemic attack) [G45.9]  Atrial fibrillation (CMS-HCC) [I48.91] [I48.91]             Anticoagulation Episode Summary     INR check location:   Outside Lab    Preferred lab:       Send INR reminders to:       Comments:   [facility]      Anticoagulation Care Providers     Provider Role Specialty Phone number    Narciso Contreras M.D.  Cardiology [phone]    Lynne Doyle A.P.N. Responsible Cardiology [phone]    AISHWARYA BartonPShaanNShaan Responsible Cardiology [phone]    [facility] Anticoagulation Services Responsible  [phone]        Anticoagulation Patient Findings        Plan: Spoke with patient on the phone. Patient is  therapeutic today. Confirmed dosing. No missed tablets in the last week. Patient denies any changes in medications or diet. Patient denies any signs or symptoms of bleeding or clotting. Instructed patient to call clinic if any unusual bleeding or bruising occurs. Will continue dosing as outlined. Will follow-up with patient in 5 week(s) d/t holiday              Yessica Nuñez, PharmD

## 2019-07-01 ENCOUNTER — ANTICOAGULATION MONITORING (OUTPATIENT)
Dept: VASCULAR LAB | Facility: MEDICAL CENTER | Age: 79
End: 2019-07-01

## 2019-07-01 DIAGNOSIS — G45.9 TIA (TRANSIENT ISCHEMIC ATTACK): ICD-10-CM

## 2019-07-01 DIAGNOSIS — I48.91 ATRIAL FIBRILLATION, UNSPECIFIED TYPE (HCC): ICD-10-CM

## 2019-07-01 LAB — INR PPP: 3 (ref 2–3.5)

## 2019-07-01 NOTE — PROGRESS NOTES
Anticoagulation Summary  As of 7/1/2019    INR goal:   2.0-3.0   TTR:   74.1 % (1.7 y)   INR used for dosing:   3.00 (7/1/2019)   Warfarin maintenance plan:   7.5 mg (5 mg x 1.5) every Sun, Thu, Sat; 5 mg (5 mg x 1) all other days   Weekly warfarin total:   42.5 mg   Plan last modified:   Estee Mustafa, PharmD (2/1/2018)   Next INR check:   7/29/2019   Target end date:   Indefinite    Indications    TIA (transient ischemic attack) [G45.9]  Atrial fibrillation (CMS-HCC) [I48.91] [I48.91]             Anticoagulation Episode Summary     INR check location:   Outside Lab    Preferred lab:       Send INR reminders to:       Comments:   [facility]      Anticoagulation Care Providers     Provider Role Specialty Phone number    Narciso Contreras M.D.  Cardiology [phone]    AISHWARYA MuellerP.N. Responsible Cardiology [phone]    AISHWARYA BartonP.NShaan Responsible Cardiology [phone]    [facility] Anticoagulation [facility] Responsible  [phone]        Anticoagulation Patient Findings  Patient Findings     Negatives:   Signs/symptoms of thrombosis, Signs/symptoms of bleeding, Laboratory test error suspected, Change in health, Change in alcohol use, Change in activity, Upcoming invasive procedure, Emergency department visit, Upcoming dental procedure, Missed doses, Extra doses, Change in medications, Change in diet/appetite, Hospital admission, Bruising, Other complaints        Spoke with the patient on the phone today, reporting a therapeutic INR of 3.0.  Confirmed the current warfarin dosing regimen and patient compliance. Patient denies any interval changes to diet and/or medications. Patient denies any signs/symptoms of bleeding or clotting.   Patient instructed to continue with the current warfarin dosing regimen, and asked to follow up again in 4 weeks.     Mitzi Pandya  PharmD

## 2019-07-23 ENCOUNTER — ANTICOAGULATION MONITORING (OUTPATIENT)
Dept: VASCULAR LAB | Facility: MEDICAL CENTER | Age: 79
End: 2019-07-23

## 2019-07-23 DIAGNOSIS — I48.91 ATRIAL FIBRILLATION, UNSPECIFIED TYPE (HCC): ICD-10-CM

## 2019-07-23 DIAGNOSIS — G45.9 TIA (TRANSIENT ISCHEMIC ATTACK): ICD-10-CM

## 2019-07-23 LAB — INR PPP: 2.8 (ref 2–3.5)

## 2019-07-23 NOTE — PROGRESS NOTES
Anticoagulation Summary  As of 2019    INR goal:   2.0-3.0   TTR:   75.0 % (1.8 y)   INR used for dosin.80 (2019)   Warfarin maintenance plan:   7.5 mg (5 mg x 1.5) every Sun, Thu, Sat; 5 mg (5 mg x 1) all other days   Weekly warfarin total:   42.5 mg   Plan last modified:   Estee Mustafa, PharmD (2018)   Next INR check:   2019   Target end date:   Indefinite    Indications    TIA (transient ischemic attack) [G45.9]  Atrial fibrillation (CMS-HCC) [I48.91] [I48.91]             Anticoagulation Episode Summary     INR check location:   Outside Lab    Preferred lab:       Send INR reminders to:       Comments:   [facility]      Anticoagulation Care Providers     Provider Role Specialty Phone number    Narciso Contreras M.D.  Cardiology [phone]    AISHWARYA MuellerP.N. Responsible Cardiology [phone]    AISHWARYA BartonP.NShaan Responsible Cardiology [phone]    [facility] Anticoagulation Services Responsible  [phone]        Anticoagulation Patient Findings          HPI:  Laurie Adams, on anticoagulation therapy with warfarin for TIA  Changes to current medical/health status since last appt: none  Denies signs/symptoms of bleeding and/or thrombosis since the last appt.    Denies any interval changes to diet  Denies any interval changes to medications since last appt.   Denies any complications or cost restrictions with current therapy.     A/P   INR  therapeutic.   Pt is to continue with current warfarin dosing regimen.     Next INR in 4 week(s).    Tyree Helton, PharmD

## 2019-09-12 ENCOUNTER — TELEPHONE (OUTPATIENT)
Dept: VASCULAR LAB | Facility: MEDICAL CENTER | Age: 79
End: 2019-09-12

## 2019-09-12 NOTE — TELEPHONE ENCOUNTER
Pt is over due for PT/INR for warfarin monitoring. Called to remind patient to get PT/INR lab done.  Phone busy, will send letter    Yessica Nuñez, Pharm D

## 2019-09-12 NOTE — LETTER
September 12, 2019        Laurie Adams   [address]  [facility] [address]        Dear Laurie Adams ,    We have been unsuccessful in our attempts to contact you regarding your Anticoagulation Service appointments. Warfarin is a potent blood-thinning agent that requires monitoring to ensure that the dosage is correct for your body.  If it isn't, you could develop serious, sometimes life-threatening bleeding problems or life-threatening blood clots or stroke could result.     It is extremely important that you have your lab work drawn as soon as possible.  We are open Monday-Friday 8 am until 5 pm.  You may reach our Service at [phone].     To monitor you effectively, we need to be able to communicate with you.  This is a requirement to be followed by our Service.  If we are unable to contact you on three separate occasions, you will be discharged from the Anticoagulation Service.     If you repeatedly fail to keep your lab appointments, you are at risk of being discharged from the Service.           Sincerely,           Barry Tolentino, PharmD, [facility]  Pharmacy Clinical Supervisor  [facility]  Outpatient Anticoagulation Service

## 2019-09-17 ENCOUNTER — ANTICOAGULATION MONITORING (OUTPATIENT)
Dept: VASCULAR LAB | Facility: MEDICAL CENTER | Age: 79
End: 2019-09-17

## 2019-09-17 ENCOUNTER — TELEPHONE (OUTPATIENT)
Dept: VASCULAR LAB | Facility: MEDICAL CENTER | Age: 79
End: 2019-09-17

## 2019-09-17 DIAGNOSIS — G45.9 TIA (TRANSIENT ISCHEMIC ATTACK): ICD-10-CM

## 2019-09-17 DIAGNOSIS — I48.91 ATRIAL FIBRILLATION, UNSPECIFIED TYPE (HCC): ICD-10-CM

## 2019-09-17 LAB — INR PPP: 2 (ref 2–3.5)

## 2019-09-18 NOTE — PROGRESS NOTES
Attempted to reach patient regarding therapeutic INR, phone line is continuously busy, will attempt to reach at later time.  Tawanda Vivas, PharmD, BCACP

## 2019-09-20 NOTE — PROGRESS NOTES
Unable to reach patient, phone continues to be out of service  Virginia Mcarthur, Clinical Pharmacist, CDE, CACP

## 2019-10-17 ENCOUNTER — ANTICOAGULATION MONITORING (OUTPATIENT)
Dept: VASCULAR LAB | Facility: MEDICAL CENTER | Age: 79
End: 2019-10-17

## 2019-10-17 DIAGNOSIS — G45.9 TIA (TRANSIENT ISCHEMIC ATTACK): ICD-10-CM

## 2019-10-17 DIAGNOSIS — I48.91 ATRIAL FIBRILLATION, UNSPECIFIED TYPE (HCC): ICD-10-CM

## 2019-10-17 LAB — INR PPP: 2.2 (ref 2–3.5)

## 2019-10-18 NOTE — PROGRESS NOTES
OP Telephone Anticoagulation Service Note    Date: 10/17/2019      Anticoagulation Summary  As of 10/17/2019    INR goal:   2.0-3.0   TTR:   78.0 % (2 y)   INR used for dosin.20 (10/17/2019)   Warfarin maintenance plan:   7.5 mg (5 mg x 1.5) every Sun, Thu, Sat; 5 mg (5 mg x 1) all other days   Weekly warfarin total:   42.5 mg   Plan last modified:   Marin BurchD (2018)   Next INR check:   2019   Target end date:   Indefinite    Indications    TIA (transient ischemic attack) [G45.9]  Atrial fibrillation (CMS-HCC) [I48.91] [I48.91]             Anticoagulation Episode Summary     INR check location:   Outside Lab    Preferred lab:       Send INR reminders to:       Comments:   [facility]      Anticoagulation Care Providers     Provider Role Specialty Phone number    Narciso Contreras M.D.  Cardiology [phone]    Lynne Doyle A.P.N. Responsible Cardiology [phone]    AISHWARYA BartonPPAIGE Responsible Cardiology [phone]    [facility] Anticoagulation Services Responsible  [phone]        Anticoagulation Patient Findings        Plan: Spoke with patient on the phone. Patient is  therapeutic today. Confirmed dosing. No missed tablets in the last week. Patient stopped lisinopril and started losartan. Patient denies any signs or symptoms of bleeding or clotting. Instructed patient to call clinic if any unusual bleeding or bruising occurs. Will continue dosing as outlined. Will follow-up with patient in 6 week(s).              Yessica Nuñez, PharmD

## 2019-12-27 ENCOUNTER — ANTICOAGULATION MONITORING (OUTPATIENT)
Dept: VASCULAR LAB | Facility: MEDICAL CENTER | Age: 79
End: 2019-12-27

## 2019-12-27 DIAGNOSIS — G45.9 TIA (TRANSIENT ISCHEMIC ATTACK): ICD-10-CM

## 2019-12-27 DIAGNOSIS — I48.91 ATRIAL FIBRILLATION, UNSPECIFIED TYPE (HCC): ICD-10-CM

## 2019-12-27 NOTE — PROGRESS NOTES
Anticoagulation clinic    Reminder voice message for patient regarding getting INR done ASAP for anticoagulation clinic.     George Villar, PharmD

## 2019-12-30 ENCOUNTER — ANTICOAGULATION MONITORING (OUTPATIENT)
Dept: VASCULAR LAB | Facility: MEDICAL CENTER | Age: 79
End: 2019-12-30

## 2019-12-30 DIAGNOSIS — I48.91 ATRIAL FIBRILLATION, UNSPECIFIED TYPE (HCC): ICD-10-CM

## 2019-12-30 DIAGNOSIS — G45.9 TIA (TRANSIENT ISCHEMIC ATTACK): ICD-10-CM

## 2019-12-30 LAB — INR PPP: 2.8 (ref 2–3.5)

## 2019-12-30 NOTE — PROGRESS NOTES
Anticoagulation Summary  As of 2019    INR goal:   2.0-3.0   TTR:   80.0 % (2.2 y)   INR used for dosin.80 (2019)   Warfarin maintenance plan:   7.5 mg (5 mg x 1.5) every Sun, Thu, Sat; 5 mg (5 mg x 1) all other days   Weekly warfarin total:   42.5 mg   Plan last modified:   Marin BurchD (2018)   Next INR check:   3/13/2020   Target end date:   Indefinite    Indications    TIA (transient ischemic attack) [G45.9]  Atrial fibrillation (CMS-HCC) [I48.91] [I48.91]             Anticoagulation Episode Summary     INR check location:   Outside Lab    Preferred lab:       Send INR reminders to:       Comments:   [facility] [place]      Anticoagulation Care Providers     Provider Role Specialty Phone number    Nacriso Contreras M.D.  Cardiology [phone]    AISHWARYA MuellerP.N. Responsible Cardiology [phone]    AISHWARYA BartonPPAIGE Responsible Cardiology [phone]    [facility] Anticoagulation Services Responsible  [phone]        Anticoagulation Patient Findings        Spoke to patient on the phone.   INR  therapeutic.   Denies signs/symptoms of bleeding and/or thrombosis.   Denies changes to diet or medications.   Follow up appointment in 8 week(s).    Continue weekly warfarin dose as noted      George Villar, PharmD, MS, BCACP, LCC    This note was created using voice recognition software (Dragon). The accuracy of the dictation is limited by the abilities of the software. I have reviewed the note prior to signing, however some errors in grammar and context are still possible. If you have any questions related to this note please do not hesitate to contact our office.

## 2020-01-07 DIAGNOSIS — I48.0 PAF (PAROXYSMAL ATRIAL FIBRILLATION) (HCC): ICD-10-CM

## 2020-01-07 DIAGNOSIS — G45.9 TRANSIENT CEREBRAL ISCHEMIA, UNSPECIFIED TYPE: ICD-10-CM

## 2020-01-07 RX ORDER — WARFARIN SODIUM 5 MG/1
TABLET ORAL
Qty: 135 TAB | Refills: 0 | Status: SHIPPED | OUTPATIENT
Start: 2020-01-07 | End: 2020-05-14

## 2020-02-18 ENCOUNTER — ANTICOAGULATION MONITORING (OUTPATIENT)
Dept: VASCULAR LAB | Facility: MEDICAL CENTER | Age: 80
End: 2020-02-18

## 2020-02-18 DIAGNOSIS — G45.9 TIA (TRANSIENT ISCHEMIC ATTACK): ICD-10-CM

## 2020-02-18 LAB — INR PPP: 2.7 (ref 2–3.5)

## 2020-02-19 NOTE — PROGRESS NOTES
Anticoagulation Summary  As of 2020    INR goal:   2.0-3.0   TTR:   81.2 % (2.3 y)   INR used for dosin.70 (2020)   Warfarin maintenance plan:   7.5 mg (5 mg x 1.5) every Sun, Thu, Sat; 5 mg (5 mg x 1) all other days   Weekly warfarin total:   42.5 mg   Plan last modified:   Estee Mustafa PharmD (2018)   Next INR check:   3/31/2020   Target end date:   Indefinite    Indications    TIA (transient ischemic attack) [G45.9]  Atrial fibrillation (CMS-HCC) [I48.91] [I48.91]             Anticoagulation Episode Summary     INR check location:   Outside Lab    Preferred lab:       Send INR reminders to:       Comments:   [facility] [place]      Anticoagulation Care Providers     Provider Role Specialty Phone number    Narciso Contreras M.D.  Cardiology [phone]    AISHWARYA MuellerP.N. Responsible Cardiology [phone]    AISHWARYA BartonPPAIGE Responsible Cardiology [phone]    [facility] Anticoagulation Services Responsible  [phone]        Anticoagulation Patient Findings      Spoke to patient on the phone.   INR  therapeutic.   Denies signs/symptoms of bleeding and/or thrombosis.   Denies changes to diet or medications.   Follow up appointment in 6 week(s).    Continue weekly warfarin dose as noted      George Villar, PharmD, MS, BCACP, LCC    This note was created using voice recognition software (Dragon). The accuracy of the dictation is limited by the abilities of the software. I have reviewed the note prior to signing, however some errors in grammar and context are still possible. If you have any questions related to this note please do not hesitate to contact our office.

## 2020-03-03 ENCOUNTER — OFFICE VISIT (OUTPATIENT)
Dept: CARDIOLOGY | Facility: MEDICAL CENTER | Age: 80
End: 2020-03-03
Payer: MEDICARE

## 2020-03-03 VITALS
WEIGHT: 200.95 LBS | OXYGEN SATURATION: 94 % | DIASTOLIC BLOOD PRESSURE: 72 MMHG | RESPIRATION RATE: 16 BRPM | SYSTOLIC BLOOD PRESSURE: 122 MMHG | HEIGHT: 64 IN | BODY MASS INDEX: 34.31 KG/M2 | HEART RATE: 63 BPM

## 2020-03-03 DIAGNOSIS — I48.0 PAROXYSMAL ATRIAL FIBRILLATION (HCC): ICD-10-CM

## 2020-03-03 LAB — EKG IMPRESSION: NORMAL

## 2020-03-03 PROCEDURE — 99214 OFFICE O/P EST MOD 30 MIN: CPT | Performed by: INTERNAL MEDICINE

## 2020-03-03 PROCEDURE — 93000 ELECTROCARDIOGRAM COMPLETE: CPT | Performed by: INTERNAL MEDICINE

## 2020-03-03 RX ORDER — ALBUTEROL SULFATE 90 UG/1
AEROSOL, METERED RESPIRATORY (INHALATION)
Qty: 8.5 G | Refills: 11 | Status: ON HOLD | OUTPATIENT
Start: 2020-03-03 | End: 2021-05-13 | Stop reason: SDUPTHER

## 2020-03-03 NOTE — PROGRESS NOTES
"      Cardiology Follow-up Consultation Note    Date of note:    3/3/2020    Primary Care Provider: Pcp Pt States None    Name:             Laurie Adams   YOB: 1940  MRN:               [id number]    CC: Follow up PAF, HTN, fall    Patient ID/HPI:   79-year-old female patient with history of multiple TIAs, paroxysmal atrial fibrillation, hypertension  Here for follow-up.  Since last evaluated by me she had an episode of atrial fibrillation lasted for several hours.  She felt heart racing, lightheaded when it happened but eventually it is resolved.  She is taking Coumadin, Eliquis, metoprolol.  She fell 3 times last month due to loss of balance.  Fortunately did not have any trauma.  Denies chest pain, shortness of breath.    ROS  Arthritis joint pains, loss of balance, falls.  All other systems reviewed and negative.    Past Medical History:   Diagnosis Date   • Arrhythmia 03/2015    A-fib takes flecainide   • Arthritis     fingers- osteo   • Breath shortness     02 @ 2L NOC   • Bronchial asthma 6/29/2012    Inhalers prn   • Bronchitis    • Cancer (HCC) 2005    squamous cell skin left chest   • Cerebral atherosclerosis    • Cold     Pt had \"head cold 6/1, surgery cristofer from 6/4 to 6/7, pt denies productive cough and SOB   • Dependence on supplemental oxygen     2 liters at night.   • Dizziness and giddiness     Episodic since ~2009   • Dyslipidemia, goal LDL below 100 6/29/2012   • History of tobacco use    • HTN (hypertension) 6/29/2012   • Obesity 6/29/2012   • Pneumonia     2015   • Stroke (HCC) 2010 2010-1017, multiple TIA's, generalized weakness   • TIA (transient ischemic attack) 6/29/2012   • Urinary incontinence          Past Surgical History:   Procedure Laterality Date   • KNEE ARTHROPLASTY TOTAL Right 6/7/2018    Procedure: KNEE ARTHROPLASTY TOTAL;  Surgeon: Eric Bunn M.D.;  Location: SURGERY [facility];  Service: Orthopedics   • CATARACT PHACO WITH IOL  6/4/2013   " Performed by Noe Jean M.D. at [facility]   • CATARACT PHACO WITH IOL  5/21/2013    Performed by Noe Jean M.D. at [facility] ORS   • SEPTOPLASTY  8/19/2009    Performed by PABLITO LORENZANA at SURGERY [facility] ORS   • SOMNOPLASTY  8/19/2009    Performed by PABLITO LORENZANA at SURGERY [facility] ORS   • ANTROSTOMY  8/19/2009    Performed by PABLITO LORENZANA at SURGERY [facility] ORS   • ETHMOIDECTOMY  8/19/2009    Performed by PABLITO LORENZANA at [facility] ORS   • OTHER  2008    sinus   • COLON RESECTION  2007   • CHOLECYSTECTOMY  1988   • APPENDECTOMY  1954   • OTHER  1943    mastoid   • GANGLION EXCISION Bilateral 1970, 1975    left wrist, right finger   • HYSTERECTOMY, VAGINAL     • MASTOIDECTOMY     • PB CHOLECYSTOENTEROSTOMY+VIVIANA-EN-Y     • PB NASAL SCOPY,REPB CSF LEAK,SPHENOID     • WRIST FUSION           Current Outpatient Medications   Medication Sig Dispense Refill   • albuterol (VENTOLIN HFA) 108 (90 Base) MCG/ACT Aero Soln inhalation aerosol Ventolin HFA 90 mcg/actuation aerosol inhaler 8.5 g 11   • warfarin (COUMADIN) 5 MG Tab TAKE 1 TO 1 & 1/2 (ONE TO ONE & ONE-HALF) TABLETS BY MOUTH ONCE DAILY 135 Tab 0   • atorvastatin (LIPITOR) 40 MG Tab Take 1 Tab by mouth every day. 90 Tab 3   • flecainide (TAMBOCOR) 100 MG Tab TAKE ONE TABLET BY MOUTH TWICE DAILY 180 Tab 3   • metoprolol SR (TOPROL XL) 100 MG TABLET SR 24 HR Take 1 Tab by mouth every day. 90 Tab 3   • loratadine (CLARITIN) 10 MG Tab Take 10 mg by mouth every day.     • Multiple Vitamins-Minerals (VISION FORMULA 2 PO) Take  by mouth every day.     • SERTRALINE 100 MG TABS Take 100 mg by mouth every day.     • VITAMIN D3 Take 5,000 Units by mouth every day.     • amoxicillin (AMOXIL) 500 MG Cap      • lisinopril (PRINIVIL) 5 MG Tab TAKE ONE TABLET BY MOUTH TWICE DAILY 180 Tab 3   • levalbuterol (XOPENEX HFA) 45 MCG/ACT inhaler Inhale 1-2 Puffs by mouth. Pt states 1-2 puffs as needed        "    No current facility-administered medications for this visit.          Allergies   Allergen Reactions   • Feldene [Piroxicam] Photosensitivity   • Sulfa Drugs Hives   • Codeine Anxiety         History reviewed. No pertinent family history.      Social History     Socioeconomic History   • Marital status:      Spouse name: Not on file   • Number of children: Not on file   • Years of education: Not on file   • Highest education level: Not on file   Occupational History   • Not on file   Social Needs   • Financial resource strain: Not on file   • Food insecurity     Worry: Not on file     Inability: Not on file   • Transportation needs     Medical: Not on file     Non-medical: Not on file   Tobacco Use   • Smoking status: Former Smoker     Packs/day: 1.00     Years: 40.00     Pack years: 40.00     Types: Cigarettes     Last attempt to quit: 1999     Years since quittin.5   • Smokeless tobacco: Never Used   Substance and Sexual Activity   • Alcohol use: No   • Drug use: No   • Sexual activity: Yes     Partners: Male   Lifestyle   • Physical activity     Days per week: Not on file     Minutes per session: Not on file   • Stress: Not on file   Relationships   • Social connections     Talks on phone: Not on file     Gets together: Not on file     Attends [employer] service: Not on file     Active member of club or organization: Not on file     Attends meetings of clubs or organizations: Not on file     Relationship status: Not on file   • Intimate partner violence     Fear of current or ex partner: Not on file     Emotionally abused: Not on file     Physically abused: Not on file     Forced sexual activity: Not on file   Other Topics Concern   • Not on file   Social History Narrative   • Not on file         Physical Exam:  Ambulatory Vitals  /72 (BP Location: Left arm, Patient Position: Sitting, BP Cuff Size: Large adult)   Pulse 63   Resp 16   Ht 1.626 m (5' 4\")   Wt 91.2 kg (200 lb 15.2 oz)  "  SpO2 94%    Oxygen Therapy:  Pulse Oximetry: 94 %  BP Readings from Last 4 Encounters:   20 122/72   19 140/88   18 153/66   18 127/64       Weight/BMI: Body mass index is 34.49 kg/m².  Wt Readings from Last 4 Encounters:   20 91.2 kg (200 lb 15.2 oz)   19 91.7 kg (202 lb 3.2 oz)   18 96.9 kg (213 lb 10 oz)   18 95.3 kg (210 lb)       General: Well appearing and in no apparent distress  Head: atrumatic  Eyes: No conjunctival pallor   ENT: normal external appearance of nose and ears  Neck: JVD absent, carotid bruits absent  Lungs: respiratory sounds  normal, additional breath sounds absent  Heart: Regular rhythm,   No palpable thrills on palpation, murmurs absent, no rubs,   Lower extremity edema absent.   Pedal pulses normal  Abdomen: soft, non tender, non distended.  Extremities/MSK: no clubbing, no cyanosis  Neurological: normal orientation, Gait normal   Psychiatric: Appropriate affect, intact judgement and insight  Skin: Warm extremities      Lab Data Review:  No results found for: CHOLSTRLTOT, LDL, HDL, TRIGLYCERIDE    Lab Results   Component Value Date/Time    SODIUM 142 2018 02:14 PM    POTASSIUM 3.9 2018 02:14 PM    CHLORIDE 105 2018 02:14 PM    CO2 31 2018 02:14 PM    GLUCOSE 124 (H) 2018 02:14 PM    BUN 33 (H) 2018 02:14 PM    CREATININE 1.28 2018 02:14 PM    CREATININE 0.8 2007 09:45 AM     Lab Results   Component Value Date/Time    ALKPHOSPHAT 78 2006 11:20 AM    ASTSGOT 18 2006 11:20 AM    ALTSGPT 29 2006 11:20 AM    TBILIRUBIN 0.5 2006 11:20 AM      Lab Results   Component Value Date/Time    WBC 9.4 2018 04:27 AM   Her recent laboratory data was reviewed, her hemoglobin A1c is 5.7, LDL was 137, normal CMP.    Medical Decision Makin-year-old female patient with history of hypertension, paroxysmal atrial fibrillation, recurrent transient ischemic attacks,  obesity,dyslipidemia presents for regular follow-up.  -She has been having recurrent falls from loss of balance.  She had balance exercises in the past from physical therapy but did not help much.  I strongly recommended she uses a cane for support.    -She did not have significant trauma with her falls, mostly controlled falls.  She has recurrent TIAs.  We will continue Coumadin for now.  -I advised her to take an extra dose of metoprolol if she has recurrence of atrial fibrillation.  She is already in 100 mg of flecainide twice a day.  We will continue that.  Blood pressure is well controlled, continue lisinopril.      Return in about 1 year (around 3/3/2021).      Russel RUTHERFORD  Interventional cardiologist  [facility], [facility]  [address] [place] [address]  Phone: [phone]  Fax: [phone]

## 2020-03-14 DIAGNOSIS — G45.9 TRANSIENT CEREBRAL ISCHEMIA, UNSPECIFIED TYPE: ICD-10-CM

## 2020-03-14 DIAGNOSIS — I48.0 PAROXYSMAL ATRIAL FIBRILLATION (HCC): ICD-10-CM

## 2020-03-14 DIAGNOSIS — E78.5 HYPERLIPIDEMIA, UNSPECIFIED HYPERLIPIDEMIA TYPE: ICD-10-CM

## 2020-03-16 RX ORDER — ATORVASTATIN CALCIUM 40 MG/1
TABLET, FILM COATED ORAL
Qty: 90 TAB | Refills: 3 | Status: SHIPPED | OUTPATIENT
Start: 2020-03-16 | End: 2021-05-20

## 2020-03-16 RX ORDER — METOPROLOL SUCCINATE 100 MG/1
TABLET, EXTENDED RELEASE ORAL
Qty: 90 TAB | Refills: 3 | Status: SHIPPED | OUTPATIENT
Start: 2020-03-16 | End: 2020-06-08

## 2020-03-30 ENCOUNTER — ANTICOAGULATION MONITORING (OUTPATIENT)
Dept: VASCULAR LAB | Facility: MEDICAL CENTER | Age: 80
End: 2020-03-30

## 2020-03-30 DIAGNOSIS — G45.9 TIA (TRANSIENT ISCHEMIC ATTACK): ICD-10-CM

## 2020-03-30 LAB — INR PPP: 3.3 (ref 2–3.5)

## 2020-03-30 NOTE — PROGRESS NOTES
Detail Level: Detailed Spoke with   Pt currently admitted at [facility] for tests, he's going to go get her later today.   Will call back tomorrow to discuss INR result with pt.   Slightly supratherapeutic     INR   Date Value Ref Range Status   03/30/2020 3.30  Final     Comment:     Banner Destiney Mustafa, PharmD, BCACP, [facility]     Lab Facility: 1 Type Of Destruction Used: Curettage Bill For Surgical Tray: no Anesthesia Volume In Cc (Will Not Render If 0): 0.5 Wound Care: Bacitracin Was A Bandage Applied: Yes Billing Type: Third-Party Bill Depth Of Biopsy: dermis Hemostasis: Jewel's Silver Nitrate Text: The wound bed was treated with silver nitrate after the biopsy was performed. Anesthesia Type: 1% lidocaine with epinephrine Notification Instructions: Patient will be notified of biopsy results. However, patient instructed to call the office if not contacted within 2-3 weeks. Curettage Text: The wound bed was treated with curettage after the biopsy was performed. Biopsy Method: Personna blade Size Of Lesion In Cm: 2.7 Lab: 3 X Size Of Lesion In Cm: 0 Dressing: bandage Electrodesiccation And Curettage Text: The wound bed was treated with electrodesiccation and curettage after the biopsy was performed. Cryotherapy Text: The wound bed was treated with cryotherapy after the biopsy was performed. Post-Care Instructions: I reviewed with the patient in detail post-care instructions. Patient is to keep the biopsy site dry overnight, and then apply Vaseline twice daily and keep covered with band-aid until healed. Consent: Written consent was obtained and risks were reviewed including but not limited to scarring, infection, bleeding, scabbing, incomplete removal, nerve damage and allergy to anesthesia. Electrodesiccation Text: The wound bed was treated with electrodesiccation after the biopsy was performed. Biopsy Type: H and E

## 2020-03-31 NOTE — PROGRESS NOTES
Anticoagulation Summary  As of 3/30/2020    INR goal:   2.0-3.0   TTR:   79.8 % (2.4 y)   INR used for dosing:   3.30! (3/30/2020)   Warfarin maintenance plan:   7.5 mg (5 mg x 1.5) every Sun, Thu, Sat; 5 mg (5 mg x 1) all other days   Weekly warfarin total:   42.5 mg   Plan last modified:   Virginia Mcarthur (3/31/2020)   Next INR check:   4/13/2020   Target end date:   Indefinite    Indications    TIA (transient ischemic attack) [G45.9]  Atrial fibrillation (CMS-HCC) [I48.91] [I48.91]             Anticoagulation Episode Summary     INR check location:   Outside Lab    Preferred lab:       Send INR reminders to:       Comments:   [facility] [place]      Anticoagulation Care Providers     Provider Role Specialty Phone number    Narciso Contreras M.D.  Cardiology [phone]    JEANNA Mueller.P.N. Responsible Cardiology [phone]    AISHWARYA BartonP.NShaan Responsible Cardiology [phone]    [facility] Anticoagulation Services Responsible  [phone]        Anticoagulation Patient Findings          Left voicemail message to report a supra therapeutic INR of 3.3.  Pt to REDUCE TONIGHT'S DOSE TO  2.5mg then continue with current warfarin dosing regimen. Requested pt contact the clinic for any s/s of unusual bleeding, bruising, clotting or any changes to diet or medication.  Follow up in 2 weeks, to reduce the risk of adverse events related to this high risk medication, warfarin.    Virginia Mcarthur, Clinical Pharmacist

## 2020-04-16 DIAGNOSIS — I48.0 PAF (PAROXYSMAL ATRIAL FIBRILLATION) (HCC): ICD-10-CM

## 2020-04-17 RX ORDER — FLECAINIDE ACETATE 100 MG/1
TABLET ORAL
Qty: 180 TAB | Refills: 3 | Status: SHIPPED | OUTPATIENT
Start: 2020-04-17 | End: 2020-05-28

## 2020-04-28 ENCOUNTER — TELEPHONE (OUTPATIENT)
Dept: VASCULAR LAB | Facility: MEDICAL CENTER | Age: 80
End: 2020-04-28

## 2020-04-29 ENCOUNTER — ANTICOAGULATION MONITORING (OUTPATIENT)
Dept: VASCULAR LAB | Facility: MEDICAL CENTER | Age: 80
End: 2020-04-29

## 2020-04-29 DIAGNOSIS — G45.9 TIA (TRANSIENT ISCHEMIC ATTACK): ICD-10-CM

## 2020-04-29 LAB — INR PPP: 2 (ref 2–3.5)

## 2020-04-29 NOTE — PROGRESS NOTES
Anticoagulation Summary  As of 2020    INR goal:   2.0-3.0   TTR:   79.7 % (2.5 y)   INR used for dosin.00 (2020)   Warfarin maintenance plan:   7.5 mg (5 mg x 1.5) every Sun, Thu, Sat; 5 mg (5 mg x 1) all other days   Weekly warfarin total:   42.5 mg   Plan last modified:   Virginia Mcarthur (2020)   Next INR check:   6/10/2020   Target end date:   Indefinite    Indications    TIA (transient ischemic attack) [G45.9]  Atrial fibrillation (CMS-HCC) [I48.91] [I48.91]             Anticoagulation Episode Summary     INR check location:   Outside Lab    Preferred lab:       Send INR reminders to:       Comments:   [facility] [place]      Anticoagulation Care Providers     Provider Role Specialty Phone number    Narciso Contreras M.D.  Cardiology [phone]    AISHWARYA MuellerPShaanN. Responsible Cardiology [phone]    AISHWARYA BartonPShaanNShaan Responsible Cardiology [phone]    [facility] Anticoagulation Services Responsible  [phone]        Anticoagulation Patient Findings          Spoke with Laurie to report a therapeutic INR of 2.0.  She reports being discharged on 35mg/week.Continue current dosing regimen.  Follow up in 4 weeks, to reduce the risk of adverse events related to this high risk medication, warfarin.    Virginia Mcarthur, Clinical Pharmacist

## 2020-05-27 ENCOUNTER — TELEPHONE (OUTPATIENT)
Dept: CARDIOLOGY | Facility: MEDICAL CENTER | Age: 80
End: 2020-05-27

## 2020-05-27 DIAGNOSIS — I48.0 PAROXYSMAL ATRIAL FIBRILLATION (HCC): ICD-10-CM

## 2020-05-27 DIAGNOSIS — G45.9 TRANSIENT CEREBRAL ISCHEMIA, UNSPECIFIED TYPE: ICD-10-CM

## 2020-05-27 NOTE — TELEPHONE ENCOUNTER
"Pt explains that she had pneumonia and was hospitalized for 8 days at [facility] beginning March 30th. She notes being sick for about 1-2 weeks before the admission as well. She believes she has been in a-fib since she came down with this pneumonia. Checking BP and HR daily. -130's/60-75; HR 70's -90; occasional palpitations, radial pulse is irregular, home pulse oximeter monitor shows irregular rhythm. No med changes made during admission and taking meds as prescribed.     Pt reports feeling \"pretty good\", she reports a lingering cough. She continues to be off balance, had another recent fall but caught herself with the wall, no trauma. No other reported symptoms.    She is mostly worried that she used to only be in a-fib occasionally but now constant.     To Dr. Arita  "

## 2020-05-28 NOTE — TELEPHONE ENCOUNTER
Message   Received: Yesterday   Message Contents   LUKE Rodriguez R.N.    Caller: Unspecified (Yesterday, 11:16 AM)               Lets stop flecainide. Ok to be in Afib unless she feels fatigue etc.   As long as rate is controlled, we are okay.      -----------------------------------------------------------------------    Contacted pt and her  said that she was still in bed. He will have her call back later.   -----------------------------------------------------------------  1000: pt returned call. Explained Dr. Arita's recommendations to stop flecainide explaining that this medication was no longer working well to control her a-fib. Advised that she monitor her symptoms and contact the office if any of these symptoms start to bother her. Advised that she also continue to check her BP and HR daily and discussed parameters for HR that would warrant a return call. Offered an in office or virtual visit in one month to follow up and assess response to stopping flecainide but patient declined saying that she does not feel comfortable going into [place] due to coronavirus and does not have access to computer or smart phone.  Explained that if her HR stays controlled and she continue to feel well she is okay to continue to monitor but would recommend an appt if concerns arise. Pt agrees with plan.

## 2020-06-03 ENCOUNTER — ANTICOAGULATION MONITORING (OUTPATIENT)
Dept: VASCULAR LAB | Facility: MEDICAL CENTER | Age: 80
End: 2020-06-03

## 2020-06-03 DIAGNOSIS — G45.9 TIA (TRANSIENT ISCHEMIC ATTACK): ICD-10-CM

## 2020-06-03 LAB — INR PPP: 2.5 (ref 2–3.5)

## 2020-06-04 NOTE — PROGRESS NOTES
Anticoagulation Summary  As of 6/3/2020    INR goal:   2.0-3.0   TTR:   80.4 % (2.6 y)   INR used for dosin.50 (6/3/2020)   Warfarin maintenance plan:   5 mg (5 mg x 1) every day   Weekly warfarin total:   35 mg   Plan last modified:   Virginia MARCUS Filter (2020)   Next INR check:   7/15/2020   Target end date:   Indefinite    Indications    TIA (transient ischemic attack) [G45.9]  Atrial fibrillation (CMS-HCC) [I48.91] [I48.91]             Anticoagulation Episode Summary     INR check location:   Outside Lab    Preferred lab:       Send INR reminders to:       Comments:   [facility]      Anticoagulation Care Providers     Provider Role Specialty Phone number    Narciso Contreras M.D.  Cardiology [phone]    JEANNA Mueller.P.N. Responsible Cardiology [phone]    AISHWARYA BartonPShaanNShaan Responsible Cardiology [phone]    [facility] Anticoagulation Services Responsible  [phone]        Anticoagulation Patient Findings      Spoke with patient to report a therapeutic INR.    Pt instructed to continue with current warfarin dosing regimen, confirms dosing.   Pt denies any s/s of bleeding, bruising, clotting or any changes to diet or medication.    Will follow up in 6 week(s).     Sandrine Kelly, PharmD

## 2020-06-08 RX ORDER — METOPROLOL SUCCINATE 100 MG/1
150 TABLET, EXTENDED RELEASE ORAL DAILY
Qty: 135 TAB | Refills: 1 | Status: SHIPPED | OUTPATIENT
Start: 2020-06-08 | End: 2021-02-23

## 2020-06-08 NOTE — TELEPHONE ENCOUNTER
Pt reports elevated HR now off the flecainide: HR , but always over 100 with light exertion like walking around the house. SBP in the 130's    Feeling lightheaded on occasion with this with low energy.  Not feeling as well off the flecainide. Can feel more palpitations and irregular beats.     Discussed potentially needing an EKG and clinic evaluation. Pt however lives in [place] and unable to get into town as her  usually drives long distances but needs back surgery. She apparently does not have a great relationship with her PCP in [place]. Worried about coronavirus risk as well. She is interested in going back on flecainide but understands this med did not work very well for her but did control her HR better. She could get an EKG at [facility] if needed.    To Dr. Arita

## 2020-06-09 NOTE — TELEPHONE ENCOUNTER
Message   Received: Today   Message Contents   LUKE Rodriguez R.N.    Caller: Unspecified (1 week ago)               LETS INCREASE TOPROL XL  MG DAILY.     ------------------------------------------------------------    Contacted pt and explained recommendations to increase metoprolol to 1.5 tablets daily or 150mg a day. Explained indication for this including better control of heart rate with a-fib and less palpitations. Advised that she continue to monitor her BP and HR closely and contact if no improvement in her HR or symptoms or if side effects arise. Pt states understanding and agrees with plan.     rx for Toprol 150mg updated to pharmacy.

## 2020-07-02 RX ORDER — DIGOXIN 125 MCG
125 TABLET ORAL DAILY
Qty: 30 TAB | Refills: 5 | Status: SHIPPED | OUTPATIENT
Start: 2020-07-02 | End: 2020-12-02 | Stop reason: SDUPTHER

## 2020-07-03 NOTE — TELEPHONE ENCOUNTER
Russel Arita M.D.  You Just now (5:48 PM)       Send prescription for digoxin 125 mcg daily.      --------------------------------------------------------------------------    Pt notified of recommendations per Dr Arita. Discussed MOA and potential side effects of digoxin. Pt advised to continue to monitor BP and HR closely with new med and to update us in a few weeks. Pt states understanding and agrees with plan.  rx sent to preferred pharmacy.

## 2020-07-03 NOTE — TELEPHONE ENCOUNTER
"S/w pt who explains her HR continues to be elevated: 90's to 110bpm just sitting around. BP has been good and a little lower than before: . Pt complains that she can feel her heart \"jumping around at night\" and it bothers her to the point that it is hard for her to sleep. Continued fatigued but this isn't worsening.     Last adjustment was increasing Toprol XL to 150mg daily. Stopped flecainide previously. She says she cannot do a telemed appt. No technology and lives remotely.     To Dr. Arita  "

## 2020-07-15 ENCOUNTER — ANTICOAGULATION MONITORING (OUTPATIENT)
Dept: VASCULAR LAB | Facility: MEDICAL CENTER | Age: 80
End: 2020-07-15

## 2020-07-15 DIAGNOSIS — G45.9 TIA (TRANSIENT ISCHEMIC ATTACK): ICD-10-CM

## 2020-07-15 LAB — INR PPP: 1.6 (ref 2–3.5)

## 2020-07-15 NOTE — PROGRESS NOTES
Anticoagulation Summary  As of 7/15/2020    INR goal:   2.0-3.0   TTR:   79.4 % (2.7 y)   INR used for dosin.60! (7/15/2020)   Warfarin maintenance plan:   7.5 mg (5 mg x 1.5) every Wed; 5 mg (5 mg x 1) all other days   Weekly warfarin total:   37.5 mg   Plan last modified:   Sandrine Kelly PharmD (7/15/2020)   Next INR check:   2020   Target end date:   Indefinite    Indications    TIA (transient ischemic attack) [G45.9]  Atrial fibrillation (CMS-HCC) [I48.91] [I48.91]             Anticoagulation Episode Summary     INR check location:   Outside Lab    Preferred lab:       Send INR reminders to:       Comments:   [facility]      Anticoagulation Care Providers     Provider Role Specialty Phone number    Narciso Contreras M.D.  Cardiology [phone]    Lynne Doyle A.P.N. Responsible Cardiology [phone]    JEANNA Barton.P.NShaan Responsible Cardiology [phone]    [facility] Anticoagulation Services Responsible  [phone]        Anticoagulation Patient Findings  Patient Findings     Positives:   Change in diet/appetite (increased vit K intake)    Negatives:   Signs/symptoms of thrombosis, Signs/symptoms of bleeding, Laboratory test error suspected, Change in health, Change in alcohol use, Change in activity, Upcoming invasive procedure, Emergency department visit, Upcoming dental procedure, Missed doses, Extra doses, Change in medications, Hospital admission, Bruising, Other complaints          Spoke with pt.  INR is subtherapeutic.   Pt denies any unusual s/s of bleeding, bruising, clotting or any changes to medications. Denies any etoh, cranberries, supplements, or illness.   Pt verifies warfarin weekly dosing.     Will have pt increase weekly regimen d/t diet change    Repeat INR in 2 week(s).     Sandrine Kelly, PharmD

## 2020-07-28 ENCOUNTER — ANTICOAGULATION MONITORING (OUTPATIENT)
Dept: VASCULAR LAB | Facility: MEDICAL CENTER | Age: 80
End: 2020-07-28

## 2020-07-28 DIAGNOSIS — G45.9 TIA (TRANSIENT ISCHEMIC ATTACK): ICD-10-CM

## 2020-07-29 LAB — INR PPP: 3.4 (ref 2–3.5)

## 2020-07-30 ENCOUNTER — ANTICOAGULATION MONITORING (OUTPATIENT)
Dept: VASCULAR LAB | Facility: MEDICAL CENTER | Age: 80
End: 2020-07-30

## 2020-07-30 DIAGNOSIS — G45.9 TIA (TRANSIENT ISCHEMIC ATTACK): ICD-10-CM

## 2020-07-30 NOTE — PROGRESS NOTES
Anticoagulation Summary  As of 7/30/2020    INR goal:   2.0-3.0   TTR:   79.0 % (2.8 y)   INR used for dosing:   3.40! (7/29/2020)   Warfarin maintenance plan:   7.5 mg (5 mg x 1.5) every Wed; 5 mg (5 mg x 1) all other days   Weekly warfarin total:   37.5 mg   Plan last modified:   Sandrine Kelly, PharmD (7/15/2020)   Next INR check:   8/6/2020   Target end date:   Indefinite    Indications    TIA (transient ischemic attack) [G45.9]  Atrial fibrillation (CMS-HCC) [I48.91] [I48.91]             Anticoagulation Episode Summary     INR check location:   Outside Lab    Preferred lab:       Send INR reminders to:       Comments:   [facility]      Anticoagulation Care Providers     Provider Role Specialty Phone number    Narciso Contreras M.D.  Cardiology [phone]    Lynne Doyel A.P.N. Responsible Cardiology [phone]    AISHWARYA BartonP.NShaan Responsible Cardiology [phone]    [facility] Anticoagulation Services Responsible  [phone]        Anticoagulation Patient Findings  Patient Findings     Positives:   Change in diet/appetite (A few less greens this week)    Negatives:   Signs/symptoms of thrombosis, Signs/symptoms of bleeding, Laboratory test error suspected, Change in health, Change in alcohol use, Change in activity, Upcoming invasive procedure, Emergency department visit, Upcoming dental procedure, Missed doses, Extra doses, Change in medications, Hospital admission, Bruising, Other complaints          Spoke with Merle.  INR is supra-therapeutic.   Pt denies any unusual s/s of bleeding, bruising, clotting or any changes medications. Denies any etoh, cranberries, supplements, or illness.   Pt verifies warfarin weekly dosing.     Will have pt reduce dose tonight to 2.5 mg then continue with normal weekly dosing.    Repeat INR in 1 week(s).     Salas Jameson, Pharmacy Intern

## 2020-08-07 ENCOUNTER — ANTICOAGULATION MONITORING (OUTPATIENT)
Dept: VASCULAR LAB | Facility: MEDICAL CENTER | Age: 80
End: 2020-08-07

## 2020-08-07 DIAGNOSIS — G45.9 TIA (TRANSIENT ISCHEMIC ATTACK): ICD-10-CM

## 2020-08-07 DIAGNOSIS — I48.91 ATRIAL FIBRILLATION, UNSPECIFIED TYPE (HCC): ICD-10-CM

## 2020-08-07 LAB — INR PPP: 3.2 (ref 2–3.5)

## 2020-08-07 NOTE — PROGRESS NOTES
Attempted to call patient, but phone busy. Will try again later.     8/7/20 2:48pm  Mitzi Pandya PharmD       OP   Telephone Anticoagulation Service Note      Anticoagulation Summary  As of 8/7/2020    INR goal:  2.0-3.0   TTR:  78.5 % (2.8 y)   INR used for dosing:  3.20 (8/5/2020)   Warfarin maintenance plan:  5 mg (5 mg x 1) every day   Weekly warfarin total:  35 mg   Plan last modified:  Deb Pandya (8/10/2020)   Next INR check:  8/19/2020   Target end date:  Indefinite    Indications    TIA (transient ischemic attack) [G45.9]  Atrial fibrillation (CMS-HCC) [I48.91] [I48.91]             Anticoagulation Episode Summary     INR check location:  Outside Lab    Preferred lab:      Send INR reminders to:      Comments:  [facility]      Anticoagulation Care Providers     Provider Role Specialty Phone number    Narciso Contreras M.D.  Cardiology [phone]    Lynne Doyle A.P.N. Responsible Cardiology [phone]    AISHWARYA BartonPShaanNShaan Responsible Cardiology [phone]    [facility] Anticoagulation Services Responsible  [phone]        Anticoagulation Patient Findings  Patient Findings     Negatives:  Signs/symptoms of thrombosis, Signs/symptoms of bleeding, Laboratory test error suspected, Change in health, Change in alcohol use, Change in activity, Upcoming invasive procedure, Emergency department visit, Upcoming dental procedure, Missed doses, Extra doses, Change in medications, Change in diet/appetite, Hospital admission, Bruising, Other complaints        Spoke with the patient on the phone today, reporting a SUPRA-therapeutic INR of 3.2 from last week. Confirmed the current warfarin dosing regimen and patient compliance. Patient denies any interval changes to diet and/or medications. Patient denies any signs/symptoms of bleeding or clotting.  Patient instructed to begin reduced weekly regimen of 5mg QD. Patient will retest again in 1 week.    Mitzi Pandya PharmD

## 2020-08-21 ENCOUNTER — ANTICOAGULATION MONITORING (OUTPATIENT)
Dept: VASCULAR LAB | Facility: MEDICAL CENTER | Age: 80
End: 2020-08-21

## 2020-08-21 DIAGNOSIS — I48.91 ATRIAL FIBRILLATION, UNSPECIFIED TYPE (HCC): ICD-10-CM

## 2020-08-21 DIAGNOSIS — G45.9 TIA (TRANSIENT ISCHEMIC ATTACK): ICD-10-CM

## 2020-08-21 LAB — INR PPP: 4 (ref 2–3.5)

## 2020-08-21 NOTE — PROGRESS NOTES
"OP   Telephone Anticoagulation Service Note      Anticoagulation Summary  As of 2020    INR goal:  2.0-3.0   TTR:  77.3 % (2.8 y)   INR used for dosin.00 (2020)   Warfarin maintenance plan:  5 mg (5 mg x 1) every day   Weekly warfarin total:  35 mg   Plan last modified:  Deb Pandya (8/10/2020)   Next INR check:  2020   Target end date:  Indefinite    Indications    TIA (transient ischemic attack) [G45.9]  Atrial fibrillation (CMS-HCC) [I48.91] [I48.91]             Anticoagulation Episode Summary     INR check location:  Outside Lab    Preferred lab:      Send INR reminders to:      Comments:  [facility]      Anticoagulation Care Providers     Provider Role Specialty Phone number    Narciso Contreras M.D.  Cardiology [phone]    AISHWARYA MuellerPShaanNShaan Responsible Cardiology [phone]    AISHWARYA BartonPShaanNShaan Responsible Cardiology [phone]    [facility] Anticoagulation Services Responsible  [phone]        Anticoagulation Patient Findings  Patient Findings     Positives:  Change in diet/appetite    Negatives:  Signs/symptoms of thrombosis, Signs/symptoms of bleeding, Laboratory test error suspected, Change in health, Change in alcohol use, Change in activity, Upcoming invasive procedure, Emergency department visit, Upcoming dental procedure, Missed doses, Extra doses, Change in medications, Hospital admission, Bruising, Other complaints    Comments:  Patient had cut back on greens this past week because she thought she was eating too much        Spoke with the patient on the phone today, reporting a SUPRA-therapeutic INR of 4.0. Confirmed the current warfarin dosing regimen and patient compliance. Patient did report that she has been eating less greens because she thought she was eating \"too much\". Patient denies any interval changes to diet and/or medications. Patient denies any signs/symptoms of bleeding or clotting.  Patient instructed to HOLD warfarin TONIGHT " ONLY, then to resume her current regimen and to resume a consistent and sustainable amount of greens and to retest again in 2 weeks.     Mitzi FunesD

## 2020-09-02 LAB — INR PPP: 2.7 (ref 2–3.5)

## 2020-09-04 ENCOUNTER — ANTICOAGULATION MONITORING (OUTPATIENT)
Dept: VASCULAR LAB | Facility: MEDICAL CENTER | Age: 80
End: 2020-09-04

## 2020-09-04 ENCOUNTER — TELEPHONE (OUTPATIENT)
Dept: VASCULAR LAB | Facility: MEDICAL CENTER | Age: 80
End: 2020-09-04

## 2020-09-04 DIAGNOSIS — G45.9 TIA (TRANSIENT ISCHEMIC ATTACK): ICD-10-CM

## 2020-09-04 DIAGNOSIS — I48.91 ATRIAL FIBRILLATION, UNSPECIFIED TYPE (HCC): ICD-10-CM

## 2020-09-04 NOTE — PROGRESS NOTES
OP   Telephone Anticoagulation Service Note      Anticoagulation Summary  As of 2020    INR goal:  2.0-3.0   TTR:  76.7 % (2.9 y)   INR used for dosin.70 (2020)   Warfarin maintenance plan:  5 mg (5 mg x 1) every day   Weekly warfarin total:  35 mg   No change documented:  Deb Pandya   Plan last modified:  Deb Pandya (8/10/2020)   Next INR check:  2020   Target end date:  Indefinite    Indications    TIA (transient ischemic attack) [G45.9]  Atrial fibrillation (CMS-HCC) [I48.91] [I48.91]             Anticoagulation Episode Summary     INR check location:  Outside Lab    Preferred lab:      Send INR reminders to:      Comments:  [facility]      Anticoagulation Care Providers     Provider Role Specialty Phone number    Narciso Contreras M.D.  Cardiology [phone]    Lynne Doyle A.P.N. Responsible Cardiology [phone]    Blaire Crowley A.P.NShaan Responsible Cardiology [phone]    [facility] Anticoagulation Services Responsible  [phone]        Anticoagulation Patient Findings  Patient Findings     Negatives:  Signs/symptoms of thrombosis, Signs/symptoms of bleeding, Laboratory test error suspected, Change in health, Change in alcohol use, Change in activity, Upcoming invasive procedure, Emergency department visit, Upcoming dental procedure, Missed doses, Extra doses, Change in medications, Change in diet/appetite, Hospital admission, Bruising, Other complaints         Spoke with the patient on the phone today, reporting a therapeutic INR of 2.7. Confirmed the current warfarin dosing regimen and patient compliance. Patient denies any interval changes to diet and/or medications. Patient denies any signs/symptoms of bleeding or clotting.  Patient instructed to continue with the current warfarin dosing regimen, and asked to follow up again in 3 weeks. (per pt preference).    Mitzi Pandya  PharmD

## 2020-09-04 NOTE — TELEPHONE ENCOUNTER
----- Message from George Villar PharmD sent at 9/4/2020  7:37 AM PDT -----  Regarding: [facility] INR  Got a INR from [facility] 9/2/2020 but they did not send us the INR result...she would like a call back.    George Villar, Darlene, MS, BCACP, [facility]  Phone [phone] fax [phone]    This note was created using voice recognition software (Dragon). The accuracy of the dictation is limited by the abilities of the software. I have reviewed the note prior to signing, however some errors in grammar and context are still possible. If you have any questions related to this note please do not hesitate to contact our office.

## 2020-09-25 ENCOUNTER — ANTICOAGULATION MONITORING (OUTPATIENT)
Dept: VASCULAR LAB | Facility: MEDICAL CENTER | Age: 80
End: 2020-09-25

## 2020-09-25 DIAGNOSIS — I48.91 ATRIAL FIBRILLATION, UNSPECIFIED TYPE (HCC): ICD-10-CM

## 2020-09-25 DIAGNOSIS — G45.9 TIA (TRANSIENT ISCHEMIC ATTACK): ICD-10-CM

## 2020-09-25 LAB — INR PPP: 3.2 (ref 2–3.5)

## 2020-09-25 NOTE — PROGRESS NOTES
Anticoagulation Summary  As of 9/25/2020    INR goal:  2.0-3.0   TTR:  76.3 % (2.9 y)   INR used for dosing:  3.20 (9/25/2020)   Warfarin maintenance plan:  5 mg (5 mg x 1) every day   Weekly warfarin total:  35 mg   Plan last modified:  Deb Pandya (8/10/2020)   Next INR check:  10/23/2020   Target end date:  Indefinite    Indications    TIA (transient ischemic attack) [G45.9]  Atrial fibrillation (CMS-HCC) [I48.91] [I48.91]             Anticoagulation Episode Summary     INR check location:  Outside Lab    Preferred lab:      Send INR reminders to:      Comments:  [facility]      Anticoagulation Care Providers     Provider Role Specialty Phone number    Narciso Contreras M.D.  Cardiology [phone]    AISHWARAY MuellerP.N. Responsible Cardiology [phone]    AISHWARYA BartonPShaanNShaan Responsible Cardiology [phone]    [facility] Anticoagulation Services Responsible  [phone]        Anticoagulation Patient Findings          Spoke with Laurie to report a supra therpaeutic INR of 3.2  Pt denies any etoh, duplicate dosing, is actually eating more greens.  Will reduce tonight's dose to 2.5mg then resume current dosing regimen. Follow up in 4 weeks, to reduce the risk of adverse events related to this high risk medication, warfarin.    Virginia Mcarthur, Clinical Pharmacist

## 2020-10-27 ENCOUNTER — ANTICOAGULATION MONITORING (OUTPATIENT)
Dept: VASCULAR LAB | Facility: MEDICAL CENTER | Age: 80
End: 2020-10-27

## 2020-10-27 DIAGNOSIS — I48.91 ATRIAL FIBRILLATION, UNSPECIFIED TYPE (HCC): ICD-10-CM

## 2020-10-27 DIAGNOSIS — G45.9 TIA (TRANSIENT ISCHEMIC ATTACK): ICD-10-CM

## 2020-10-27 LAB — INR PPP: 3.7 (ref 2–3.5)

## 2020-10-28 NOTE — PROGRESS NOTES
OP   Telephone Anticoagulation Service Note      Anticoagulation Summary  As of 10/27/2020    INR goal:  2.0-3.0   TTR:  74.1 % (3 y)   INR used for dosing:  3.70 (10/27/2020)   Warfarin maintenance plan:  2.5 mg (5 mg x 0.5) every Sat; 5 mg (5 mg x 1) all other days   Weekly warfarin total:  32.5 mg   Plan last modified:  Deb Pandya (10/27/2020)   Next INR check:  11/17/2020   Target end date:  Indefinite    Indications    TIA (transient ischemic attack) [G45.9]  Atrial fibrillation (CMS-HCC) [I48.91] [I48.91]             Anticoagulation Episode Summary     INR check location:  Outside Lab    Preferred lab:      Send INR reminders to:      Comments:  [facility]      Anticoagulation Care Providers     Provider Role Specialty Phone number    Narciso Contreras M.D.  Cardiology [phone]    AISHWARYA MuellerP.N. Responsible Cardiology [phone]    AISHWARYA BartonPShaanNShaan Responsible Cardiology [phone]    [facility] Anticoagulation Services Responsible  [phone]        Anticoagulation Patient Findings  Patient Findings     Negatives:  Signs/symptoms of thrombosis, Signs/symptoms of bleeding, Laboratory test error suspected, Change in health, Change in alcohol use, Change in activity, Upcoming invasive procedure, Emergency department visit, Upcoming dental procedure, Missed doses, Extra doses, Change in medications, Change in diet/appetite, Hospital admission, Bruising, Other complaints         .Spoke with the patient on the phone today, reporting a SUPRA-therapeutic INR of 3.7. Confirmed the current warfarin dosing regimen and patient compliance. Patient denies any interval changes to diet and/or medications. Patient denies any signs/symptoms of bleeding or clotting.  Patient instructed to HOLD dose TONIGHT ONLY, as a one time adjustment, then to begin reduced weekly regimen of 2.5mg on Sat and 5mg ROW. Patient asked to retest again in 3 weeks (per pt preference).     Mitzi Pandya   PharmD

## 2020-11-19 ENCOUNTER — ANTICOAGULATION MONITORING (OUTPATIENT)
Dept: VASCULAR LAB | Facility: MEDICAL CENTER | Age: 80
End: 2020-11-19

## 2020-11-19 DIAGNOSIS — G45.9 TIA (TRANSIENT ISCHEMIC ATTACK): ICD-10-CM

## 2020-11-19 DIAGNOSIS — I48.91 ATRIAL FIBRILLATION, UNSPECIFIED TYPE (HCC): ICD-10-CM

## 2020-11-19 LAB — INR PPP: 2.8 (ref 2–3.5)

## 2020-11-20 NOTE — PROGRESS NOTES
OP   Telephone Anticoagulation Service Note      Anticoagulation Summary  As of 2020    INR goal:  2.0-3.0   TTR:  73.1 % (3.1 y)   INR used for dosin.80 (2020)   Warfarin maintenance plan:  5 mg (5 mg x 1) every day   Weekly warfarin total:  35 mg   Plan last modified:  Deb Pandya (2020)   Next INR check:  2020   Target end date:  Indefinite    Indications    TIA (transient ischemic attack) [G45.9]  Atrial fibrillation (CMS-HCC) [I48.91] [I48.91]             Anticoagulation Episode Summary     INR check location:  Outside Lab    Preferred lab:      Send INR reminders to:      Comments:  [facility]      Anticoagulation Care Providers     Provider Role Specialty Phone number    Narciso Contreras M.D.  Cardiology [phone]    MITCH Mueller Responsible Cardiology [phone]    MICHELLE BartonNShaan Responsible Cardiology [phone]    [facility] Anticoagulation Services Responsible  [phone]        Anticoagulation Patient Findings     Spoke with the patient on the phone today, reporting a therapeutic INR of 2.8.   Confirmed the current warfarin dosing regimen and patient compliance.  Patient reports she has been taking 5mg QD. Since she is therapeutic will have her continue with this dosing.   Patient denies any interval changes to diet and/or medications. Patient denies any signs/symptoms of bleeding or clotting.  Patient would like to continue with the current warfarin dosing regimen, and asked to follow up again in 4 weeks (per pt preference).     Mitzi Pandya  PharmD

## 2020-11-20 NOTE — PROGRESS NOTES
Attempted to call pt regarding INR of 2.8, however, phone line is repeatedly busy.      LANI Ely  [facility]

## 2020-12-02 DIAGNOSIS — I47.10 PAROXYSMAL SUPRAVENTRICULAR TACHYCARDIA (HCC): ICD-10-CM

## 2020-12-02 RX ORDER — DIGOXIN 125 MCG
125 TABLET ORAL DAILY
Qty: 30 TAB | Refills: 11 | Status: SHIPPED | OUTPATIENT
Start: 2020-12-02 | End: 2021-06-08

## 2020-12-18 LAB — INR PPP: 4.1 (ref 2–3.5)

## 2020-12-19 ENCOUNTER — ANTICOAGULATION MONITORING (OUTPATIENT)
Dept: VASCULAR LAB | Facility: MEDICAL CENTER | Age: 80
End: 2020-12-19

## 2020-12-19 DIAGNOSIS — G45.9 TIA (TRANSIENT ISCHEMIC ATTACK): ICD-10-CM

## 2020-12-19 DIAGNOSIS — I48.91 ATRIAL FIBRILLATION, UNSPECIFIED TYPE (HCC): ICD-10-CM

## 2020-12-21 NOTE — PROGRESS NOTES
OP   Telephone Anticoagulation Service Note      Anticoagulation Summary  As of 2020    INR goal:  2.0-3.0   TTR:  71.6 % (3.2 y)   INR used for dosin.10 (2020)   Warfarin maintenance plan:  2.5 mg (5 mg x 0.5) every Tue; 5 mg (5 mg x 1) all other days   Weekly warfarin total:  32.5 mg   Plan last modified:  Deb Pandya (2020)   Next INR check:  2021   Target end date:  Indefinite    Indications    TIA (transient ischemic attack) [G45.9]  Atrial fibrillation (CMS-HCC) [I48.91] [I48.91]             Anticoagulation Episode Summary     INR check location:  Outside Lab    Preferred lab:      Send INR reminders to:      Comments:  [facility]      Anticoagulation Care Providers     Provider Role Specialty Phone number    Narciso Contreras M.D.  Cardiology [phone]    AISHWARYA MuellerPShaanN. Responsible Cardiology [phone]    AISHWARYA BartonPPAIGE Responsible Cardiology [phone]    [facility] Anticoagulation Services Responsible  [phone]        Anticoagulation Patient Findings    Left a message on the patient's voicemail today, informing the patient of a SUPRA-therapeutic INR of 4.1.  Unable to verify dosing or any interval changes, but instructed the patient to call the clinic with any changes to diet or medications, with any signs/sx of bleeding or clotting or with any questions or concerns.     Patient instructed to HOLD warfarin TONIGHT ONLY, then to begin reduced weekly regimen of 2.5mg on , and 5mg . Patient asked to retest again in 2 weeks.     Mitzi FunesD

## 2021-01-01 NOTE — LETTER
"     [facility]   [address], [address]  [place] [address]  Phone: [phone]  Fax: [phone]              Laurie Adams  1940    Encounter Date: 3/26/2019    Russel Arita M.D.          PROGRESS NOTE:      Cardiology Initial Consultation Note    Date of note:    3/26/2019    Primary Care Provider: Олег Colin M.D. (Inactive)  Referring Provider: Russel Arita M*     Patient Name: Laurie Adams   YOB: 1940  MRN:              [id number]    Chief Complaint: Follow up Afib    Laurie Adams is a 78 y.o. female  patient presented today for follow-up regarding her atrial fibrillation.  In 2015 she had ovarian surgery at [facility], she was dehydrated in postop.  She developed atrial fibrillation.  At that time echocardiogram performed showed normal ejection fraction no significant valvular abnormalities, mild concentric left ventricular hypertrophy.  She did have intermittent palpitations after that, was placed on flecainide was being followed by Dr. Contreras.  She is here for follow-up.  She has no recurrent palpitations doing well.  Denies chest pain or shortness of breath with exertion.  No specific complaints today.    ROS  Arthritis, back pain, hair loss.  All other systems reviewed and discussed using a comprehensive questionnaire and are negative.     Past medical history, family history, social history, allergies and labs are reviewed and updated as needed as documented below.    Past Medical History:   Diagnosis Date   • Arrhythmia 03/2015    A-fib takes flecainide   • Arthritis     fingers- osteo   • Breath shortness     02 @ 2L NOC   • Bronchial asthma 6/29/2012    Inhalers prn   • Bronchitis    • Cancer (HCC) 2005    squamous cell skin left chest   • Cerebral atherosclerosis    • Cold     Pt had \"head cold 6/1, surgery cristofer from 6/4 to 6/7, pt denies productive cough and SOB   • Dependence on " [FreeTextEntry3] : alert supplemental oxygen     2 liters at night.   • Dizziness and giddiness     Episodic since ~2009   • Dyslipidemia, goal LDL below 100 6/29/2012   • History of tobacco use    • HTN (hypertension) 6/29/2012   • Obesity 6/29/2012   • Pneumonia     2015   • Stroke (HCC) 2010 2010-1017, multiple TIA's, generalized weakness   • TIA (transient ischemic attack) 6/29/2012   • Urinary incontinence          Past Surgical History:   Procedure Laterality Date   • KNEE ARTHROPLASTY TOTAL Right 6/7/2018    Procedure: KNEE ARTHROPLASTY TOTAL;  Surgeon: Eric Bunn M.D.;  Location: SURGERY [facility];  Service: Orthopedics   • CATARACT PHACO WITH IOL  6/4/2013    Performed by Noe Jean M.D. at [facility]   • CATARACT PHACO WITH IOL  5/21/2013    Performed by Noe Jean M.D. at [facility]   • SEPTOPLASTY  8/19/2009    Performed by PABLITO LORENZANA at SURGERY [facility]   • SOMNOPLASTY  8/19/2009    Performed by PABLITO LORENZANA at SURGERY [facility]   • ANTROSTOMY  8/19/2009    Performed by PABLITO LORENZANA at SURGERY [facility]   • ETHMOIDECTOMY  8/19/2009    Performed by PABLITO LORENZANA at SURGERY [facility]   • OTHER  2008    sinus   • COLON RESECTION  2007   • CHOLECYSTECTOMY  1988   • APPENDECTOMY  1954   • OTHER  1943    mastoid   • GANGLION EXCISION Bilateral 1970, 1975    left wrist, right finger   • HYSTERECTOMY, VAGINAL     • MASTOIDECTOMY     • PB CHOLECYSTOENTEROSTOMY+VIVIANA-EN-Y     • PB NASAL SCOPY,REPB CSF LEAK,SPHENOID     • WRIST FUSION           Current Outpatient Prescriptions   Medication Sig Dispense Refill   • atorvastatin (LIPITOR) 40 MG Tab Take 1 Tab by mouth every day. 90 Tab 3   • flecainide (TAMBOCOR) 100 MG Tab TAKE ONE TABLET BY MOUTH TWICE DAILY 180 Tab 3   • metoprolol SR (TOPROL XL) 100 MG TABLET SR 24 HR Take 1 Tab by mouth every day. 90 Tab 3   • lisinopril (PRINIVIL) 5 MG Tab TAKE ONE TABLET BY MOUTH TWICE DAILY 180  "Tab 3   • warfarin (COUMADIN) 5 MG Tab TAKE 1 TO 1 & 1/2 (ONE TO ONE & ONE-HALF) TABLETS BY MOUTH ONCE DAILY 135 Tab 1   • loratadine (CLARITIN) 10 MG Tab Take 10 mg by mouth every day.     • Multiple Vitamins-Minerals (VISION FORMULA 2 PO) Take  by mouth every day.     • levalbuterol (XOPENEX HFA) 45 MCG/ACT inhaler Inhale 1-2 Puffs by mouth. Pt states 1-2 puffs as needed      • SERTRALINE 100 MG TABS Take 100 mg by mouth every day.     • VITAMIN D3 Take 5,000 Units by mouth every day.     • albuterol (VENTOLIN HFA) 108 (90 Base) MCG/ACT Aero Soln inhalation aerosol Ventolin HFA 90 mcg/actuation aerosol inhaler     • amoxicillin (AMOXIL) 500 MG Cap        No current facility-administered medications for this visit.          Allergies   Allergen Reactions   • Feldene [Piroxicam] Photosensitivity   • Sulfa Drugs Hives   • Codeine Anxiety         No family history on file.      Social History     Social History   • Marital status:      Spouse name: N/A   • Number of children: N/A   • Years of education: N/A     Occupational History   • Not on file.     Social History Main Topics   • Smoking status: Former Smoker     Packs/day: 1.00     Years: 40.00     Types: Cigarettes     Quit date: 8/14/1999   • Smokeless tobacco: Never Used   • Alcohol use No   • Drug use: No   • Sexual activity: Yes     Partners: Male     Other Topics Concern   • Not on file     Social History Narrative   • No narrative on file         Physical Exam:  Ambulatory Vitals  Blood pressure 140/88, pulse 72, height 1.626 m (5' 4\"), weight 91.7 kg (202 lb 3.2 oz), SpO2 91 %.   Oxygen Therapy:  Pulse Oximetry: 91 %  BP Readings from Last 4 Encounters:   03/26/19 140/88   06/09/18 153/66   04/26/18 127/64   11/09/17 120/70       Weight/BMI: Body mass index is 34.71 kg/m².  Wt Readings from Last 4 Encounters:   03/26/19 91.7 kg (202 lb 3.2 oz)   06/06/18 96.9 kg (213 lb 10 oz)   04/26/18 95.3 kg (210 lb)   11/09/17 90.7 kg (200 lb)     General: Well " appearing and in no apparent distress  Head: atrumatic  Eyes: No conjunctival pallor   ENT: normal external appearance of nose and ears  Neck: JVD absent, carotid bruits absent  Lungs: respiratory sounds  normal, additional breath sounds absent  Heart: Regular rhythm,   No palpable thrills on palpation, murmurs absent, no rubs,   Lower extremity edema absent.   Pedal pulses normal  Abdomen: soft, non tender, non distended.  Extremities/MSK: no clubbing, no cyanosis  Neurological: normal orientation, Gait normal   Psychiatric: Appropriate affect, intact judgement and insight  Skin: Warm extremities        Lab Data Review:  No results found for: CHOLSTRLTOT, LDL, HDL, TRIGLYCERIDE    Lab Results   Component Value Date/Time    SODIUM 142 2018 02:14 PM    POTASSIUM 3.9 2018 02:14 PM    CHLORIDE 105 2018 02:14 PM    CO2 31 2018 02:14 PM    GLUCOSE 124 (H) 2018 02:14 PM    BUN 33 (H) 2018 02:14 PM    CREATININE 1.28 2018 02:14 PM    CREATININE 0.8 2007 09:45 AM     Lab Results   Component Value Date/Time    ALKPHOSPHAT 78 2006 11:20 AM    ASTSGOT 18 2006 11:20 AM    ALTSGPT 29 2006 11:20 AM    TBILIRUBIN 0.5 2006 11:20 AM      Lab Results   Component Value Date/Time    WBC 9.4 2018 04:27 AM       Cardiac Imaging and Procedures Review:    EKG dated  : My personal interpretation is sinus rhythm, first-degree AV block, QRS duration 100      Medical Decision Makin-year-old female patient with history of hypertension, paroxysmal atrial fibrillation, recurrent transient ischemic attacks, obesity,dyslipidemia presents for regular follow-up.  Her recent laboratory data was reviewed, her hemoglobin A1c is 5.7, LDL was 137, normal CMP.  Renewed Coumadin, flecainide, metoprolol succinate, lisinopril.  Blood pressure reasonably controlled, advised to check at home.  No bleeding issues with Coumadin, tolerating well.  She is requesting her  carotids to be checked, given her significant history of recurrent strokes will order carotid ultrasound.  She did not tolerate higher dose of statin in the past secondary to her fall, advised to do lifestyle modifications to get LDL less than 100.    Return in about 6 months (around 9/26/2019).    This note was dictated using Dragon speech recognition software.    Russel RUTHERFORD  Interventional cardiologist  [facility], [address]  [address] [address]  Phone: [phone]  Fax: [phone]                  No Recipients

## 2021-01-05 LAB — INR PPP: 2.6 (ref 2–3.5)

## 2021-01-06 ENCOUNTER — ANTICOAGULATION MONITORING (OUTPATIENT)
Dept: VASCULAR LAB | Facility: MEDICAL CENTER | Age: 81
End: 2021-01-06

## 2021-01-06 DIAGNOSIS — I48.91 ATRIAL FIBRILLATION, UNSPECIFIED TYPE (HCC): ICD-10-CM

## 2021-01-06 DIAGNOSIS — G45.9 TIA (TRANSIENT ISCHEMIC ATTACK): ICD-10-CM

## 2021-01-06 NOTE — PROGRESS NOTES
"Anticoagulation Summary  As of 2021    INR goal:  2.0-3.0   TTR:  70.9 % (3.2 y)   INR used for dosin.60 (2021)   Warfarin maintenance plan:  2.5 mg (5 mg x 0.5) every Tue; 5 mg (5 mg x 1) all other days   Weekly warfarin total:  32.5 mg   Plan last modified:  Deb Pandya (2020)   Next INR check:  2021   Target end date:  Indefinite    Indications    TIA (transient ischemic attack) [G45.9]  Atrial fibrillation (CMS-HCC) [I48.91] [I48.91]             Anticoagulation Episode Summary     INR check location:  Outside Lab    Preferred lab:      Send INR reminders to:      Comments:  [facility]      Anticoagulation Care Providers     Provider Role Specialty Phone number    Narciso Contreras M.D.  Cardiology [phone]    AISHWARYA MuellerP.N. Responsible Cardiology [phone]    AISHWARYA BartonPPAIGE Responsible Cardiology [phone]    [facility] Anticoagulation Services Responsible  [phone]        Anticoagulation Patient Findings      Spoke with patient to report a therapeutic INR.    Pt instructed to continue with current warfarin dosing regimen, confirms dosing.   Pt denies any s/s of bleeding, bruising, clotting or any changes to medication.  Pt reports over the past weeks she has consistently been eating more \"greens\" lately and drinks roughly half a glass of V8, which she states is solely a vegetable blend. I reminder her of foods that may decrease her INR and that consistent intake is key to avoid fluctuation in her INR, pt confirmed understanding.   Will follow up in 3 week(s).       Jin Rucker, Pharmacy Intern    "

## 2021-01-26 LAB — INR PPP: 2.9 (ref 2–3.5)

## 2021-01-27 ENCOUNTER — ANTICOAGULATION MONITORING (OUTPATIENT)
Dept: VASCULAR LAB | Facility: MEDICAL CENTER | Age: 81
End: 2021-01-27

## 2021-01-27 DIAGNOSIS — G45.9 TIA (TRANSIENT ISCHEMIC ATTACK): ICD-10-CM

## 2021-01-27 DIAGNOSIS — I48.91 ATRIAL FIBRILLATION, UNSPECIFIED TYPE (HCC): ICD-10-CM

## 2021-01-27 NOTE — PROGRESS NOTES
Anticoagulation Summary  As of 2021    INR goal:  2.0-3.0   TTR:  71.4 % (3.3 y)   INR used for dosin.90 (2021)   Warfarin maintenance plan:  2.5 mg (5 mg x 0.5) every Tue; 5 mg (5 mg x 1) all other days   Weekly warfarin total:  32.5 mg   Plan last modified:  Deb Pandya (2020)   Next INR check:  2021   Target end date:  Indefinite    Indications    TIA (transient ischemic attack) [G45.9]  Atrial fibrillation (CMS-HCC) [I48.91] [I48.91]             Anticoagulation Episode Summary     INR check location:  Outside Lab    Preferred lab:      Send INR reminders to:      Comments:  [facility]      Anticoagulation Care Providers     Provider Role Specialty Phone number    Narciso Contreras M.D.  Cardiology [phone]    AISHWARYA MuellerP.N. Responsible Cardiology [phone]    AISHWARYA BartonP.NShaan Responsible Cardiology [phone]    [facility] Anticoagulation Services Responsible  [phone]        Anticoagulation Patient Findings      Left voicemail message to report a therapeutic INR of 2.90.    Will have pt continue with current warfarin dosing regimen. Requested pt contact the clinic for any s/s of unusual bleeding, bruising, clotting or any changes to diet or medication.    FU INR in 3 week(s).    Jin Rucker, Pharmacy Intern

## 2021-02-18 ENCOUNTER — ANTICOAGULATION MONITORING (OUTPATIENT)
Dept: VASCULAR LAB | Facility: MEDICAL CENTER | Age: 81
End: 2021-02-18

## 2021-02-18 DIAGNOSIS — I48.91 ATRIAL FIBRILLATION, UNSPECIFIED TYPE (HCC): ICD-10-CM

## 2021-02-18 DIAGNOSIS — G45.9 TIA (TRANSIENT ISCHEMIC ATTACK): ICD-10-CM

## 2021-02-18 LAB — INR PPP: 2.8 (ref 2–3.5)

## 2021-02-19 NOTE — PROGRESS NOTES
OP Telephone Anticoagulation Service Note    Date: 2021      Anticoagulation Summary  As of 2021    INR goal:  2.0-3.0   TTR:  72.0 % (3.3 y)   INR used for dosin.80 (2021)   Warfarin maintenance plan:  2.5 mg (5 mg x 0.5) every Tue; 5 mg (5 mg x 1) all other days   Weekly warfarin total:  32.5 mg   Plan last modified:  Deb Pandya (2020)   Next INR check:  3/18/2021   Target end date:  Indefinite    Indications    TIA (transient ischemic attack) [G45.9]  Atrial fibrillation (CMS-HCC) [I48.91] [I48.91]             Anticoagulation Episode Summary     INR check location:  Outside Lab    Preferred lab:      Send INR reminders to:      Comments:  [facility]      Anticoagulation Care Providers     Provider Role Specialty Phone number    Narciso Contreras M.D.  Cardiology [phone]    JEANNA Mueller.P.NShaan Responsible Cardiology [phone]    MITCH Barton Responsible Cardiology [phone]    [facility] Anticoagulation Services Responsible  [phone]        Anticoagulation Patient Findings      INR therapeutic at 2.8.  Spoke w/ pt on phone.  Verified regimen w/ pt.  Instructed pt to continue on with current regimen.  NO s/s bleeding reported per pt.  NO changes in diet reported per pt.  NO changes in medications reported per pt.  Check INR in 4 week(s).  Instructed pt to call clinic at [phone] if there are any questions.  Pt stated understanding.    Pt is not on antiplatelet therapy.    Nick Wolfe, MarinD

## 2021-03-18 ENCOUNTER — ANTICOAGULATION MONITORING (OUTPATIENT)
Dept: CARDIOLOGY | Facility: MEDICAL CENTER | Age: 81
End: 2021-03-18

## 2021-03-18 DIAGNOSIS — I48.91 ATRIAL FIBRILLATION, UNSPECIFIED TYPE (HCC): ICD-10-CM

## 2021-03-18 DIAGNOSIS — G45.9 TIA (TRANSIENT ISCHEMIC ATTACK): ICD-10-CM

## 2021-03-18 LAB — INR PPP: 2.6 (ref 2–3.5)

## 2021-03-18 NOTE — PROGRESS NOTES
OP Telephone Anticoagulation Service Note    Date: 3/18/2021      Anticoagulation Summary  As of 3/18/2021    INR goal:  2.0-3.0   TTR:  72.6 % (3.4 y)   INR used for dosin.60 (3/18/2021)   Warfarin maintenance plan:  2.5 mg (5 mg x 0.5) every Tue; 5 mg (5 mg x 1) all other days   Weekly warfarin total:  32.5 mg   Plan last modified:  Deb Pandya (2020)   Next INR check:  2021   Target end date:  Indefinite    Indications    TIA (transient ischemic attack) [G45.9]  Atrial fibrillation (CMS-HCC) [I48.91] [I48.91]             Anticoagulation Episode Summary     INR check location:  Outside Lab    Preferred lab:      Send INR reminders to:      Comments:  [facility]      Anticoagulation Care Providers     Provider Role Specialty Phone number    Narciso Contreras M.D.  Cardiology [phone]    JEANNA Mueller.P.NShaan Responsible Cardiology [phone]    MITCH Barton Responsible Cardiology [phone]    [facility] Anticoagulation Services Responsible  [phone]        Anticoagulation Patient Findings      INR therapeutic at 2.6.  Spoke w/ pt on phone.  Verified regimen w/ pt.  Instructed pt to continue on with current regimen.  NO s/s bleeding reported per pt.  NO changes in diet reported per pt.  NO changes in medications reported per pt.  Check INR in 5 week(s).  Instructed pt to call clinic at [phone] if there are any questions.  Pt stated understanding.    Pt is not on antiplatelet therapy.    Nick Wolfe, MarinD

## 2021-04-22 ENCOUNTER — ANTICOAGULATION MONITORING (OUTPATIENT)
Dept: VASCULAR LAB | Facility: MEDICAL CENTER | Age: 81
End: 2021-04-22

## 2021-04-22 DIAGNOSIS — I48.91 ATRIAL FIBRILLATION, UNSPECIFIED TYPE (HCC): ICD-10-CM

## 2021-04-22 DIAGNOSIS — G45.9 TIA (TRANSIENT ISCHEMIC ATTACK): ICD-10-CM

## 2021-04-22 LAB — INR PPP: 2.8 (ref 2–3.5)

## 2021-04-23 NOTE — PROGRESS NOTES
OP   Telephone Anticoagulation Service Note      Anticoagulation Summary  As of 2021    INR goal:  2.0-3.0   TTR:  73.3 % (3.5 y)   INR used for dosin.80 (2021)   Warfarin maintenance plan:  2.5 mg (5 mg x 0.5) every Tue; 5 mg (5 mg x 1) all other days   Weekly warfarin total:  32.5 mg   Plan last modified:  Deb Pandya (2020)   Next INR check:  6/3/2021   Target end date:  Indefinite    Indications    TIA (transient ischemic attack) [G45.9]  Atrial fibrillation (CMS-HCC) [I48.91] [I48.91]             Anticoagulation Episode Summary     INR check location:  Outside Lab    Preferred lab:      Send INR reminders to:      Comments:  [facility]      Anticoagulation Care Providers     Provider Role Specialty Phone number    Narciso Contreras M.D.  Cardiology [phone]    AISHWARYA MuellerPShaanN. Responsible Cardiology [phone]    MITCH Barton Responsible Cardiology [phone]    [facility] Anticoagulation Services Responsible  [phone]        Anticoagulation Patient Findings    Spoke with the patient on the phone today, reporting a therapeutic INR of 2.8.   Confirmed the current warfarin dosing regimen and patient compliance.  Patient denies any interval changes to diet and/or medications. Patient denies any signs/symptoms of bleeding or clotting.  Patient instructed to continue with the current warfarin dosing regimen, and asked to follow up again in 6 weeks.     Mitzi FunesD

## 2021-05-04 ENCOUNTER — TELEPHONE (OUTPATIENT)
Dept: CARDIOLOGY | Facility: MEDICAL CENTER | Age: 81
End: 2021-05-04

## 2021-05-04 ENCOUNTER — OFFICE VISIT (OUTPATIENT)
Dept: CARDIOLOGY | Facility: MEDICAL CENTER | Age: 81
End: 2021-05-04
Payer: MEDICARE

## 2021-05-04 VITALS
RESPIRATION RATE: 16 BRPM | HEIGHT: 64 IN | DIASTOLIC BLOOD PRESSURE: 86 MMHG | BODY MASS INDEX: 33.46 KG/M2 | HEART RATE: 88 BPM | WEIGHT: 196 LBS | SYSTOLIC BLOOD PRESSURE: 132 MMHG | OXYGEN SATURATION: 93 %

## 2021-05-04 DIAGNOSIS — I49.9 CARDIAC ARRHYTHMIA, UNSPECIFIED CARDIAC ARRHYTHMIA TYPE: ICD-10-CM

## 2021-05-04 DIAGNOSIS — I48.91 ATRIAL FIBRILLATION, UNSPECIFIED TYPE (HCC): ICD-10-CM

## 2021-05-04 LAB — EKG IMPRESSION: NORMAL

## 2021-05-04 PROCEDURE — 93000 ELECTROCARDIOGRAM COMPLETE: CPT | Performed by: INTERNAL MEDICINE

## 2021-05-04 PROCEDURE — 99214 OFFICE O/P EST MOD 30 MIN: CPT | Performed by: INTERNAL MEDICINE

## 2021-05-04 RX ORDER — FLECAINIDE ACETATE 100 MG/1
100 TABLET ORAL 2 TIMES DAILY
Qty: 60 TABLET | Refills: 11 | Status: SHIPPED | OUTPATIENT
Start: 2021-05-04 | End: 2022-05-27

## 2021-05-04 RX ORDER — LOSARTAN POTASSIUM 25 MG/1
25 TABLET ORAL DAILY
Status: ON HOLD | COMMUNITY
Start: 2021-04-05 | End: 2021-05-11 | Stop reason: SDUPTHER

## 2021-05-04 NOTE — PROGRESS NOTES
"      Cardiology Follow-up Consultation Note    Date of note:    5/4/2021    Primary Care Provider: Pcp Pt States None    Name:             Laurie Adams   YOB: 1940  MRN:               [id number]    CC: Follow-up atrial fibrillation, hypertension    Patient ID/HPI:   80-year-old female patient here for follow-up atrial fibrillation, hypertension.  Last year she was hospitalized with pneumonia, her flecainide was stopped due to unknown reasons.  She is not taking her flecainide since last year.  She complains of feeling fatigue, lack of energy.  She continues to have falls-2 recently with minor trauma.  Takes other medications regularly including warfarin.      ROS  Other systems reviewed and negative.    Past Medical History:   Diagnosis Date   • Arrhythmia 03/2015    A-fib takes flecainide   • Arthritis     fingers- osteo   • Breath shortness     02 @ 2L NOC   • Bronchial asthma 6/29/2012    Inhalers prn   • Bronchitis    • Cancer (HCC) 2005    squamous cell skin left chest   • Cerebral atherosclerosis    • Cold     Pt had \"head cold 6/1, surgery cristofer from 6/4 to 6/7, pt denies productive cough and SOB   • Dependence on supplemental oxygen     2 liters at night.   • Dizziness and giddiness     Episodic since ~2009   • Dyslipidemia, goal LDL below 100 6/29/2012   • History of tobacco use    • HTN (hypertension) 6/29/2012   • Obesity 6/29/2012   • Pneumonia     2015   • Stroke (HCC) 2010 2010-1017, multiple TIA's, generalized weakness   • TIA (transient ischemic attack) 6/29/2012   • Urinary incontinence          Past Surgical History:   Procedure Laterality Date   • KNEE ARTHROPLASTY TOTAL Right 6/7/2018    Procedure: KNEE ARTHROPLASTY TOTAL;  Surgeon: Eric Bunn M.D.;  Location: SURGERY [facility];  Service: Orthopedics   • CATARACT PHACO WITH IOL  6/4/2013    Performed by Noe Jean M.D. at [facility]   • CATARACT PHACO WITH IOL  5/21/2013    Performed " by Noe Jean M.D. at [facility]   • SEPTOPLASTY  8/19/2009    Performed by PABLITO LORENZANA at SUR[facility]ERY [facility]   • SOMNOPLASTY  8/19/2009    Performed by PABLITO LORENZANA at SUR[facility]   • ANTROSTOMY  8/19/2009    Performed by PABLITO LORENZANA at SUR[facility]   • ETHMOIDECTOMY  8/19/2009    Performed by PABLITO LORENZANA at SUR[facility]   • OTHER  2008    sinus   • COLON RESECTION  2007   • CHOLECYSTECTOMY  1988   • APPENDECTOMY  1954   • OTHER  1943    mastoid   • GANGLION EXCISION Bilateral 1970, 1975    left wrist, right finger   • HYSTERECTOMY, VAGINAL     • MASTOIDECTOMY     • PB CHOLECYSTOENTEROSTOMY+VIVIANA-EN-Y     • PB NASAL SCOPY,REPB CSF LEAK,SPHENOID     • WRIST FUSION           Current Outpatient Medications   Medication Sig Dispense Refill   • flecainide (TAMBOCOR) 100 MG Tab Take 1 tablet by mouth 2 times a day. 60 tablet 11   • metoprolol SR (TOPROL XL) 100 MG TABLET SR 24 HR Take 1 tablet by mouth once daily 90 tablet 0   • digoxin (LANOXIN) 125 MCG Tab Take 1 Tab by mouth every day. 30 Tab 11   • warfarin (COUMADIN) 5 MG Tab TAKE 1 TO 1 & 1/2 (ONE TO ONE & ONE-HALF) TABLETS BY MOUTH ONCE DAILY 135 Tab 3   • atorvastatin (LIPITOR) 40 MG Tab Take 1 tablet by mouth once daily 90 Tab 3   • albuterol (VENTOLIN HFA) 108 (90 Base) MCG/ACT Aero Soln inhalation aerosol Ventolin HFA 90 mcg/actuation aerosol inhaler 8.5 g 11   • Multiple Vitamins-Minerals (VISION FORMULA 2 PO) Take  by mouth every day.     • levalbuterol (XOPENEX HFA) 45 MCG/ACT inhaler Inhale 1-2 Puffs by mouth. Pt states 1-2 puffs as needed      • SERTRALINE 100 MG TABS Take 100 mg by mouth every day.     • VITAMIN D3 Take 5,000 Units by mouth every day.     • losartan (COZAAR) 25 MG Tab Take 25 mg by mouth.       No current facility-administered medications for this visit.         Allergies   Allergen Reactions   • Feldene [Piroxicam] Photosensitivity   • Sulfa Drugs  "Hives   • Codeine Anxiety         History reviewed. No pertinent family history.      Social History     Socioeconomic History   • Marital status:      Spouse name: Not on file   • Number of children: Not on file   • Years of education: Not on file   • Highest education level: Not on file   Occupational History   • Not on file   Tobacco Use   • Smoking status: Former Smoker     Packs/day: 1.00     Years: 40.00     Pack years: 40.00     Types: Cigarettes     Quit date: 1999     Years since quittin.7   • Smokeless tobacco: Never Used   Substance and Sexual Activity   • Alcohol use: No   • Drug use: No   • Sexual activity: Yes     Partners: Male   Other Topics Concern   • Not on file   Social History Narrative   • Not on file     Social Determinants of Health     Financial Resource Strain:    • Difficulty of Paying Living Expenses:    Food Insecurity:    • Worried About Running Out of Food in the Last Year:    • Ran Out of Food in the Last Year:    Transportation Needs:    • Lack of Transportation (Medical):    • Lack of Transportation (Non-Medical):    Physical Activity:    • Days of Exercise per Week:    • Minutes of Exercise per Session:    Stress:    • Feeling of Stress :    Social Connections:    • Frequency of Communication with Friends and Family:    • Frequency of Social Gatherings with Friends and Family:    • Attends Church Services:    • Active Member of Clubs or Organizations:    • Attends Club or Organization Meetings:    • Marital Status:    Intimate Partner Violence:    • Fear of Current or Ex-Partner:    • Emotionally Abused:    • Physically Abused:    • Sexually Abused:          Physical Exam:  Ambulatory Vitals  /86 (BP Location: Left arm, Patient Position: Sitting, BP Cuff Size: Adult)   Pulse 88   Resp 16   Ht 1.626 m (5' 4\")   Wt 88.9 kg (196 lb)   SpO2 93%    Oxygen Therapy:  Pulse Oximetry: 93 %  BP Readings from Last 4 Encounters:   21 132/86   20 " 122/72   03/26/19 140/88   06/09/18 153/66       Weight/BMI: Body mass index is 33.64 kg/m².  Wt Readings from Last 4 Encounters:   05/04/21 88.9 kg (196 lb)   03/03/20 91.2 kg (200 lb 15.2 oz)   03/26/19 91.7 kg (202 lb 3.2 oz)   06/06/18 96.9 kg (213 lb 10 oz)       General: Well appearing and in no apparent distress  Head: atrumatic  Eyes: No conjunctival pallor   ENT: normal external appearance of nose and ears  Neck: JVD absent, carotid bruits absent  Lungs: respiratory sounds  normal, additional breath sounds absent  Heart: Irregularly irregular rhythm, tachycardia no palpable thrills on palpation, murmurs absent, no rubs,   Lower extremity edema absent.   Pedal pulses normal  Abdomen: soft, non tender, non distended.  Extremities/MSK: no clubbing, no cyanosis  Neurological: normal orientation, Gait normal   Psychiatric: Appropriate affect, intact judgement and insight  Skin: Warm extremities        Lab Data Review:  No results found for: CHOLSTRLTOT, LDL, HDL, TRIGLYCERIDE    Lab Results   Component Value Date/Time    SODIUM 142 05/24/2018 02:14 PM    POTASSIUM 3.9 05/24/2018 02:14 PM    CHLORIDE 105 05/24/2018 02:14 PM    CO2 31 05/24/2018 02:14 PM    GLUCOSE 124 (H) 05/24/2018 02:14 PM    BUN 33 (H) 05/24/2018 02:14 PM    CREATININE 1.28 05/24/2018 02:14 PM    CREATININE 0.8 12/11/2007 09:45 AM     Lab Results   Component Value Date/Time    ALKPHOSPHAT 78 11/13/2006 11:20 AM    ASTSGOT 18 11/13/2006 11:20 AM    ALTSGPT 29 11/13/2006 11:20 AM    TBILIRUBIN 0.5 11/13/2006 11:20 AM      Lab Results   Component Value Date/Time    WBC 9.4 06/09/2018 04:27 AM     EKG today shows atrial fibrillation with rapid ventricular response    Impression and Plan:  80-year-old female patient with hypertension, paroxysmal atrial fibrillation, recurrent transient ischemic attacks, obesity, dyslipidemia presented for follow-up.  Unfortunately she is in A. fib on flecainide, likely the cause for her fatigue.  Recommend  cardioversion, restarting flecainide.  Continue warfarin.  She will need echocardiogram at some point.  Blood pressure well controlled.      Russel RUTHERFORD  Interventional cardiologist  [facility], [facility]  [address] [address]  Phone: [phone]  Fax: [phone]

## 2021-05-04 NOTE — TELEPHONE ENCOUNTER
Patient is scheduled on 5-6-21 for a CV w/anesthesia with Dr. Arita. No meds to stop and patient to check in at 11:30 for a 1:30 procedure. H&P was done on 5-4-21 by Dr. Arita. Pre admit to call patient.

## 2021-05-05 NOTE — OR NURSING
Attempted to complete pre-surgery covid-19 screening. Called twice, and got a busy signal both times.

## 2021-05-06 ENCOUNTER — HOSPITAL ENCOUNTER (OUTPATIENT)
Facility: MEDICAL CENTER | Age: 81
DRG: 871 | End: 2021-05-06
Attending: INTERNAL MEDICINE | Admitting: INTERNAL MEDICINE
Payer: MEDICARE

## 2021-05-06 ENCOUNTER — APPOINTMENT (OUTPATIENT)
Dept: CARDIOLOGY | Facility: MEDICAL CENTER | Age: 81
DRG: 871 | End: 2021-05-06
Attending: INTERNAL MEDICINE
Payer: MEDICARE

## 2021-05-06 VITALS
SYSTOLIC BLOOD PRESSURE: 155 MMHG | TEMPERATURE: 98.4 F | DIASTOLIC BLOOD PRESSURE: 72 MMHG | WEIGHT: 194.67 LBS | BODY MASS INDEX: 33.23 KG/M2 | RESPIRATION RATE: 16 BRPM | HEIGHT: 64 IN | HEART RATE: 77 BPM | OXYGEN SATURATION: 93 %

## 2021-05-06 DIAGNOSIS — I48.91 ATRIAL FIBRILLATION, UNSPECIFIED TYPE (HCC): ICD-10-CM

## 2021-05-06 LAB
ANION GAP SERPL CALC-SCNC: 10 MMOL/L (ref 7–16)
BUN SERPL-MCNC: 19 MG/DL (ref 8–22)
CALCIUM SERPL-MCNC: 9.1 MG/DL (ref 8.5–10.5)
CHLORIDE SERPL-SCNC: 105 MMOL/L (ref 96–112)
CO2 SERPL-SCNC: 25 MMOL/L (ref 20–33)
CREAT SERPL-MCNC: 0.92 MG/DL (ref 0.5–1.4)
EKG IMPRESSION: NORMAL
EKG IMPRESSION: NORMAL
ERYTHROCYTE [DISTWIDTH] IN BLOOD BY AUTOMATED COUNT: 46.7 FL (ref 35.9–50)
GLUCOSE SERPL-MCNC: 112 MG/DL (ref 65–99)
HCT VFR BLD AUTO: 46.6 % (ref 37–47)
HGB BLD-MCNC: 14.8 G/DL (ref 12–16)
INR PPP: 2.03 (ref 0.87–1.13)
MCH RBC QN AUTO: 29.8 PG (ref 27–33)
MCHC RBC AUTO-ENTMCNC: 31.8 G/DL (ref 33.6–35)
MCV RBC AUTO: 93.8 FL (ref 81.4–97.8)
PLATELET # BLD AUTO: 292 K/UL (ref 164–446)
PMV BLD AUTO: 11.8 FL (ref 9–12.9)
POTASSIUM SERPL-SCNC: 4.8 MMOL/L (ref 3.6–5.5)
PROTHROMBIN TIME: 23.6 SEC (ref 12–14.6)
RBC # BLD AUTO: 4.97 M/UL (ref 4.2–5.4)
SARS-COV+SARS-COV-2 AG RESP QL IA.RAPID: NOTDETECTED
SARS-COV-2 RNA RESP QL NAA+PROBE: NOTDETECTED
SODIUM SERPL-SCNC: 140 MMOL/L (ref 135–145)
SPECIMEN SOURCE: NORMAL
SPECIMEN SOURCE: NORMAL
WBC # BLD AUTO: 8.3 K/UL (ref 4.8–10.8)

## 2021-05-06 PROCEDURE — 92960 CARDIOVERSION ELECTRIC EXT: CPT | Performed by: INTERNAL MEDICINE

## 2021-05-06 PROCEDURE — 93010 ELECTROCARDIOGRAM REPORT: CPT | Mod: 59 | Performed by: INTERNAL MEDICINE

## 2021-05-06 PROCEDURE — 93005 ELECTROCARDIOGRAM TRACING: CPT | Performed by: INTERNAL MEDICINE

## 2021-05-06 PROCEDURE — 85610 PROTHROMBIN TIME: CPT

## 2021-05-06 PROCEDURE — 160002 HCHG RECOVERY MINUTES (STAT)

## 2021-05-06 PROCEDURE — U0005 INFEC AGEN DETEC AMPLI PROBE: HCPCS

## 2021-05-06 PROCEDURE — 700111 HCHG RX REV CODE 636 W/ 250 OVERRIDE (IP): Performed by: INTERNAL MEDICINE

## 2021-05-06 PROCEDURE — 700111 HCHG RX REV CODE 636 W/ 250 OVERRIDE (IP)

## 2021-05-06 PROCEDURE — 80048 BASIC METABOLIC PNL TOTAL CA: CPT

## 2021-05-06 PROCEDURE — 92960 CARDIOVERSION ELECTRIC EXT: CPT

## 2021-05-06 PROCEDURE — 700101 HCHG RX REV CODE 250: Performed by: INTERNAL MEDICINE

## 2021-05-06 PROCEDURE — 85027 COMPLETE CBC AUTOMATED: CPT

## 2021-05-06 PROCEDURE — 99152 MOD SED SAME PHYS/QHP 5/>YRS: CPT | Performed by: INTERNAL MEDICINE

## 2021-05-06 PROCEDURE — 700105 HCHG RX REV CODE 258: Performed by: INTERNAL MEDICINE

## 2021-05-06 PROCEDURE — 87426 SARSCOV CORONAVIRUS AG IA: CPT

## 2021-05-06 PROCEDURE — A9270 NON-COVERED ITEM OR SERVICE: HCPCS | Performed by: INTERNAL MEDICINE

## 2021-05-06 PROCEDURE — U0003 INFECTIOUS AGENT DETECTION BY NUCLEIC ACID (DNA OR RNA); SEVERE ACUTE RESPIRATORY SYNDROME CORONAVIRUS 2 (SARS-COV-2) (CORONAVIRUS DISEASE [COVID-19]), AMPLIFIED PROBE TECHNIQUE, MAKING USE OF HIGH THROUGHPUT TECHNOLOGIES AS DESCRIBED BY CMS-2020-01-R: HCPCS

## 2021-05-06 RX ORDER — SODIUM CHLORIDE, SODIUM LACTATE, POTASSIUM CHLORIDE, CALCIUM CHLORIDE 600; 310; 30; 20 MG/100ML; MG/100ML; MG/100ML; MG/100ML
INJECTION, SOLUTION INTRAVENOUS CONTINUOUS
Status: ACTIVE | OUTPATIENT
Start: 2021-05-06 | End: 2021-05-06

## 2021-05-06 RX ORDER — MIDAZOLAM HYDROCHLORIDE 1 MG/ML
INJECTION INTRAMUSCULAR; INTRAVENOUS
Status: COMPLETED
Start: 2021-05-06 | End: 2021-05-06

## 2021-05-06 RX ORDER — MIDAZOLAM HYDROCHLORIDE 1 MG/ML
.5-2 INJECTION INTRAMUSCULAR; INTRAVENOUS PRN
Status: DISCONTINUED | OUTPATIENT
Start: 2021-05-06 | End: 2021-05-06 | Stop reason: HOSPADM

## 2021-05-06 RX ORDER — SODIUM CHLORIDE 9 MG/ML
500 INJECTION, SOLUTION INTRAVENOUS
Status: DISCONTINUED | OUTPATIENT
Start: 2021-05-06 | End: 2021-05-06 | Stop reason: HOSPADM

## 2021-05-06 RX ADMIN — SODIUM CHLORIDE, POTASSIUM CHLORIDE, SODIUM LACTATE AND CALCIUM CHLORIDE: 600; 310; 30; 20 INJECTION, SOLUTION INTRAVENOUS at 11:30

## 2021-05-06 RX ADMIN — FENTANYL CITRATE 50 MCG: 50 INJECTION, SOLUTION INTRAMUSCULAR; INTRAVENOUS at 13:58

## 2021-05-06 RX ADMIN — POVIDONE IODINE 15 ML: 100 SOLUTION TOPICAL at 11:30

## 2021-05-06 RX ADMIN — FENTANYL CITRATE 25 MCG: 50 INJECTION, SOLUTION INTRAMUSCULAR; INTRAVENOUS at 14:02

## 2021-05-06 RX ADMIN — MIDAZOLAM HYDROCHLORIDE 1 MG: 1 INJECTION, SOLUTION INTRAMUSCULAR; INTRAVENOUS at 14:02

## 2021-05-06 RX ADMIN — MIDAZOLAM HYDROCHLORIDE 2 MG: 1 INJECTION, SOLUTION INTRAMUSCULAR; INTRAVENOUS at 13:58

## 2021-05-06 NOTE — OR NURSING
1428: Patient arrived to recovery from echo procedure room s/p cardioversion with RN. Patient is alert and awake. Updated on POC. Patient tolerating PO intake.  1440: EKG completed at bedside.  1500: Criteria met to discharge patient. Discharge paperwork reviewed with patient and responsible adult. Discussed activity, site care, worsening symptoms, and follow-up. Verbalized understanding. No further questions. PIV removed, tip intact.  1510: Patient escorted out with RN. Patient declines use of a wheelchair. Discharge instructions and personal belongings in possession of the patient. Copy of discharge instructions signed and placed in the chart. Patient discharged to home with responsible adult.

## 2021-05-06 NOTE — DISCHARGE INSTRUCTIONS
ACTIVITY: Rest and take it easy for the first 24 hours.  A responsible adult is recommended to remain with you during that time.  It is normal to feel sleepy.  We encourage you to not do anything that requires balance, judgment or coordination.    MILD FLU-LIKE SYMPTOMS ARE NORMAL. YOU MAY EXPERIENCE GENERALIZED MUSCLE ACHES, THROAT IRRITATION, HEADACHE AND/OR SOME NAUSEA.    FOR 24 HOURS DO NOT:  Drive, operate machinery or run household appliances.  Drink beer or alcoholic beverages.   Make important decisions or sign legal documents.    SPECIAL INSTRUCTIONS: Follow up with your Cardiologist.  Resume home medications.    Electrical Cardioversion, Care After  This sheet gives you information about how to care for yourself after your procedure. Your health care provider may also give you more specific instructions. If you have problems or questions, contact your health care provider.  What can I expect after the procedure?  After the procedure, it is common to have:  · Some redness on the skin where the shocks were given.  Follow these instructions at home:  · Do not drive for 24 hours if you were given a medicine to help you relax (sedative).  · Take over-the-counter and prescription medicines only as told by your health care provider.  · Ask your health care provider how to check your pulse. Check it often.  · Rest for 48 hours after the procedure or as told by your health care provider.  · Avoid or limit your caffeine use as told by your health care provider.  Contact a health care provider if:  · You feel like your heart is beating too quickly or your pulse is not regular.  · You have a serious muscle cramp that does not go away.  Get help right away if:  · You have discomfort in your chest.  · You are dizzy or you feel faint.  · You have trouble breathing or you are short of breath.  · Your speech is slurred.  · You have trouble moving an arm or leg on one side of your body.  · Your fingers or toes turn cold  or blue.  This information is not intended to replace advice given to you by your health care provider. Make sure you discuss any questions you have with your health care provider.    DIET: To avoid nausea, slowly advance diet as tolerated, avoiding spicy or greasy foods for the first day.  Add more substantial food to your diet according to your physician's instructions.  Babies can be fed formula or breast milk as soon as they are hungry.  INCREASE FLUIDS AND FIBER TO AVOID CONSTIPATION.    FOLLOW-UP APPOINTMENT:  A follow-up appointment should be arranged with your doctor; call to schedule.    You should CALL YOUR PHYSICIAN if you develop:  Fever greater than 101 degrees F.  Pain not relieved by medication, or persistent nausea or vomiting.  Excessive bleeding (blood soaking through dressing) or unexpected drainage from the wound.  Extreme redness or swelling around the incision site, drainage of pus or foul smelling drainage.  Inability to urinate or empty your bladder within 8 hours.  Problems with breathing or chest pain.    You should call 911 if you develop problems with breathing or chest pain.  If you are unable to contact your doctor or surgical center, you should go to the nearest emergency room or urgent care center.  Physician's telephone #: [phone]    If any questions arise, call your doctor.  If your doctor is not available, please feel free to call the Surgical Center at [phone]. The Contact Center is open Monday through Friday 7AM to 5PM and may speak to a nurse at [phone], or toll free at [phone].     A registered nurse may call you a few days after your surgery to see how you are doing after your procedure.    MEDICATIONS: Resume taking daily medication.  Take prescribed pain medication with food.  If no medication is prescribed, you may take non-aspirin pain medication if needed.  PAIN MEDICATION CAN BE VERY CONSTIPATING.  Take a stool softener or laxative such as  senokot, pericolace, or milk of magnesia if needed.    If your physician has prescribed pain medication that includes Acetaminophen (Tylenol), do not take additional Acetaminophen (Tylenol) while taking the prescribed medication.    Depression / Suicide Risk    As you are discharged from this [facility] facility, it is important to learn how to keep safe from harming yourself.    Recognize the warning signs:  · Abrupt changes in personality, positive or negative- including increase in energy   · Giving away possessions  · Change in eating patterns- significant weight changes-  positive or negative  · Change in sleeping patterns- unable to sleep or sleeping all the time   · Unwillingness or inability to communicate  · Depression  · Unusual sadness, discouragement and loneliness  · Talk of wanting to die  · Neglect of personal appearance   · Rebelliousness- reckless behavior  · Withdrawal from people/activities they love  · Confusion- inability to concentrate     If you or a loved one observes any of these behaviors or has concerns about self-harm, here's what you can do:  · Talk about it- your feelings and reasons for harming yourself  · Remove any means that you might use to hurt yourself (examples: pills, rope, extension cords, firearm)  · Get professional help from the community (Mental Health, Substance Abuse, psychological counseling)  · Do not be alone:Call your Safe Contact- someone whom you trust who will be there for you.  · Call your local CRISIS HOTLINE 540-1094 or 105-646-1760  · Call your local Children's Mobile Crisis Response Team [place] (788) 672-4132 or www.Z2  · Call the toll free National Suicide Prevention Hotlines   · National Suicide Prevention Lifeline 919-288-FYYF (8445)  · National Hope Line Network 800-SUICIDE (181-6258)

## 2021-05-07 ENCOUNTER — APPOINTMENT (OUTPATIENT)
Dept: RADIOLOGY | Facility: MEDICAL CENTER | Age: 81
DRG: 871 | End: 2021-05-07
Attending: EMERGENCY MEDICINE
Payer: MEDICARE

## 2021-05-07 ENCOUNTER — HOSPITAL ENCOUNTER (OUTPATIENT)
Dept: RADIOLOGY | Facility: MEDICAL CENTER | Age: 81
End: 2021-05-07
Payer: MEDICARE

## 2021-05-07 ENCOUNTER — HOSPITAL ENCOUNTER (INPATIENT)
Facility: MEDICAL CENTER | Age: 81
LOS: 5 days | DRG: 871 | End: 2021-05-13
Attending: EMERGENCY MEDICINE | Admitting: STUDENT IN AN ORGANIZED HEALTH CARE EDUCATION/TRAINING PROGRAM
Payer: MEDICARE

## 2021-05-07 DIAGNOSIS — J44.1 ACUTE EXACERBATION OF CHRONIC OBSTRUCTIVE PULMONARY DISEASE (COPD) (HCC): ICD-10-CM

## 2021-05-07 DIAGNOSIS — R09.02 HYPOXIA: ICD-10-CM

## 2021-05-07 DIAGNOSIS — J44.1 CHRONIC OBSTRUCTIVE PULMONARY DISEASE WITH ACUTE EXACERBATION (HCC): ICD-10-CM

## 2021-05-07 DIAGNOSIS — I50.9 CONGESTIVE HEART FAILURE, UNSPECIFIED HF CHRONICITY, UNSPECIFIED HEART FAILURE TYPE (HCC): ICD-10-CM

## 2021-05-07 DIAGNOSIS — J18.9 PNEUMONIA OF RIGHT LOWER LOBE DUE TO INFECTIOUS ORGANISM: ICD-10-CM

## 2021-05-07 LAB
ALBUMIN SERPL BCP-MCNC: 4.2 G/DL (ref 3.2–4.9)
ALBUMIN/GLOB SERPL: 1.3 G/DL
ALP SERPL-CCNC: 102 U/L (ref 30–99)
ALT SERPL-CCNC: 18 U/L (ref 2–50)
ANION GAP SERPL CALC-SCNC: 11 MMOL/L (ref 7–16)
APTT PPP: 32.9 SEC (ref 24.7–36)
AST SERPL-CCNC: 18 U/L (ref 12–45)
BASOPHILS # BLD AUTO: 0.2 % (ref 0–1.8)
BASOPHILS # BLD: 0.04 K/UL (ref 0–0.12)
BILIRUB SERPL-MCNC: 0.7 MG/DL (ref 0.1–1.5)
BUN SERPL-MCNC: 15 MG/DL (ref 8–22)
CALCIUM SERPL-MCNC: 8.9 MG/DL (ref 8.5–10.5)
CHLORIDE SERPL-SCNC: 102 MMOL/L (ref 96–112)
CO2 SERPL-SCNC: 28 MMOL/L (ref 20–33)
CREAT SERPL-MCNC: 0.87 MG/DL (ref 0.5–1.4)
D DIMER PPP IA.FEU-MCNC: 0.43 UG/ML (FEU) (ref 0–0.5)
EOSINOPHIL # BLD AUTO: 0.02 K/UL (ref 0–0.51)
EOSINOPHIL NFR BLD: 0.1 % (ref 0–6.9)
ERYTHROCYTE [DISTWIDTH] IN BLOOD BY AUTOMATED COUNT: 46.3 FL (ref 35.9–50)
FLUAV RNA SPEC QL NAA+PROBE: NEGATIVE
FLUBV RNA SPEC QL NAA+PROBE: NEGATIVE
GLOBULIN SER CALC-MCNC: 3.2 G/DL (ref 1.9–3.5)
GLUCOSE SERPL-MCNC: 147 MG/DL (ref 65–99)
HCT VFR BLD AUTO: 44.1 % (ref 37–47)
HGB BLD-MCNC: 14.3 G/DL (ref 12–16)
IMM GRANULOCYTES # BLD AUTO: 0.06 K/UL (ref 0–0.11)
IMM GRANULOCYTES NFR BLD AUTO: 0.4 % (ref 0–0.9)
INR PPP: 2.01 (ref 0.87–1.13)
LACTATE BLD-SCNC: 2.8 MMOL/L (ref 0.5–2)
LYMPHOCYTES # BLD AUTO: 0.93 K/UL (ref 1–4.8)
LYMPHOCYTES NFR BLD: 5.6 % (ref 22–41)
MCH RBC QN AUTO: 30 PG (ref 27–33)
MCHC RBC AUTO-ENTMCNC: 32.4 G/DL (ref 33.6–35)
MCV RBC AUTO: 92.6 FL (ref 81.4–97.8)
MONOCYTES # BLD AUTO: 0.84 K/UL (ref 0–0.85)
MONOCYTES NFR BLD AUTO: 5.1 % (ref 0–13.4)
NEUTROPHILS # BLD AUTO: 14.68 K/UL (ref 2–7.15)
NEUTROPHILS NFR BLD: 88.6 % (ref 44–72)
NRBC # BLD AUTO: 0 K/UL
NRBC BLD-RTO: 0 /100 WBC
NT-PROBNP SERPL IA-MCNC: 1305 PG/ML (ref 0–125)
PLATELET # BLD AUTO: 299 K/UL (ref 164–446)
PMV BLD AUTO: 11.8 FL (ref 9–12.9)
POTASSIUM SERPL-SCNC: 4 MMOL/L (ref 3.6–5.5)
PROT SERPL-MCNC: 7.4 G/DL (ref 6–8.2)
PROTHROMBIN TIME: 23.4 SEC (ref 12–14.6)
RBC # BLD AUTO: 4.76 M/UL (ref 4.2–5.4)
RSV RNA SPEC QL NAA+PROBE: NEGATIVE
SARS-COV-2 RNA RESP QL NAA+PROBE: NOTDETECTED
SODIUM SERPL-SCNC: 141 MMOL/L (ref 135–145)
SPECIMEN SOURCE: NORMAL
TROPONIN T SERPL-MCNC: 29 NG/L (ref 6–19)
WBC # BLD AUTO: 16.6 K/UL (ref 4.8–10.8)

## 2021-05-07 PROCEDURE — 0241U HCHG SARS-COV-2 COVID-19 NFCT DS RESP RNA 4 TRGT MIC: CPT

## 2021-05-07 PROCEDURE — 85610 PROTHROMBIN TIME: CPT

## 2021-05-07 PROCEDURE — 83605 ASSAY OF LACTIC ACID: CPT

## 2021-05-07 PROCEDURE — 71045 X-RAY EXAM CHEST 1 VIEW: CPT

## 2021-05-07 PROCEDURE — 85730 THROMBOPLASTIN TIME PARTIAL: CPT

## 2021-05-07 PROCEDURE — C9803 HOPD COVID-19 SPEC COLLECT: HCPCS | Performed by: EMERGENCY MEDICINE

## 2021-05-07 PROCEDURE — 80053 COMPREHEN METABOLIC PANEL: CPT

## 2021-05-07 PROCEDURE — 99285 EMERGENCY DEPT VISIT HI MDM: CPT

## 2021-05-07 PROCEDURE — 85025 COMPLETE CBC W/AUTO DIFF WBC: CPT

## 2021-05-07 PROCEDURE — 83880 ASSAY OF NATRIURETIC PEPTIDE: CPT

## 2021-05-07 PROCEDURE — 85379 FIBRIN DEGRADATION QUANT: CPT

## 2021-05-07 PROCEDURE — 84484 ASSAY OF TROPONIN QUANT: CPT

## 2021-05-07 PROCEDURE — 93005 ELECTROCARDIOGRAM TRACING: CPT | Performed by: EMERGENCY MEDICINE

## 2021-05-07 PROCEDURE — 84145 PROCALCITONIN (PCT): CPT

## 2021-05-07 RX ORDER — AZITHROMYCIN 500 MG/1
500 INJECTION, POWDER, LYOPHILIZED, FOR SOLUTION INTRAVENOUS ONCE
Status: COMPLETED | OUTPATIENT
Start: 2021-05-07 | End: 2021-05-08

## 2021-05-07 NOTE — PROCEDURES
Cardiology Progress Note - Patrick Yung 78 y o  female MRN: [id number]    Unit/Bed#: ICU 03 Encounter: [id number]      Assessment:  1  New-onset atrial fibrillation with RVR  2  Acute diastolic heart failure  3  ?Sepsis 2/2 multi-focal pneumonia  · procalcitonin negative x2  4  Hypertension  5  Hyperlipidemia  6  Morbid Obesity, Body mass index is 53 85 kg/m²  Plan  New onset atrial fibrillation with RVR  · HR was slightly better controlled overnight, but continues to go to 130s with minimal activity and anxiety  · On cardizem 120 q8h + metoprolol  bid  · HR remains in 120s  · Will add digoxin 0 125mg daily  · On eliquis 5 bid for anticoagulation  · Sleep study as outpatient    Addendum - 8/29/19 - 1 pm - Her heart rate has continued to be in 110-130s, despite high doses of BB+CCB  She will benefit from cardioversion attempt  But needs SUSIE prior to that, and as a result, will transfer to [facility] for SUSIE-cardioversion  Have discussed with Radha Gonzales at [facility] regarding the same  Please keep NPO after midnight  Acute diastolic heart failure  · Likely 2/2 rapid afib over the past week  · On IV lasix 40 tid  · UO 5 L yesterday --> Net -2 9 L yesterday  · Wt down to 285 lbs  · She feels much better today, but is still hypoxic at 92% on 6 L NC  · Continue IV lasix 40 tid  · K 3 4 today, replete to keep K =4  · CXR today - B/L pleural effusion and vascular congestion  · Still very volume overloaded, her lowest weight over the last 6 months was 258 lbs  · May need to reconsider thoracocentesis  Will d/w critical care/IR      Subjective:    No significant events overnight  Out of bed to chair today, without any shortness of breath       Review of Systems  No c/o chest pain, No c/o palpitations, c/o shortness of breath, No c/o dizziness or light-headedness, No c/o abdominal pain, no c/o nausea/vomitting  All other systems negative    Telemetry Review: Afib, HR 90s overnight, now back up to 130s    Objective: Procedure performed: External DC Cardioversion    Assistant: None    Anesthesia: 3 mg of Versed 75 mcg of fentanyl given.  I supervised moderate sedation over a trained independent nursing staff.  Sedation start time 1358, sedation stop time 1408    Indication: Atrial fibrillation    Preprocedural Diagnosis:   Atrial Fibrillation  Postprocedural Diagnosis: Sinus Rhythm    Description of procedure:    Patient was brought to the pre/post procedure area of the cath lab. Informed consent was obtained. Defibrillator pads were placed in the anterior and posterior position.  Adequate sedation was obtained with assistance of anesthesia.  Patient was successfully cardioverted with 200 J synchronized biphasic energy into sinus rhythm. she was monitored in the recovery area until criteria met and will be discharged home.    Conclusion: Successful DC cardioversion    EBL: None    Complications: None      Electronically signed:   Russel Arita  Interventional Cardiologist [facility]     Vitals: Blood pressure 143/70, pulse (!) 127, temperature 97 7 °F (36 5 °C), temperature source Temporal, resp  rate 12, height 5' 1" (1 549 m), weight 129 kg (285 lb), SpO2 92 %  , Body mass index is 53 85 kg/m² ,   Orthostatic Blood Pressures      Most Recent Value   Blood Pressure  143/70 filed at 08/29/2019 0600   Patient Position - Orthostatic VS  Lying filed at 08/29/2019 2709         Systolic (47NXD), BEE:201 , Min:112 , FYM:926     Diastolic (41VNP), LOD:65, Min:61, Max:90    Wt Readings from Last 5 Encounters:   08/29/19 129 kg (285 lb)     I/O       08/26 0701 - 08/27 0700 08/27 0701 - 08/28 0700 08/28 0701 - 08/29 0700    P  O   360     I V  (mL/kg) 135 3 (1)  281 5 (2 1)    IV Piggyback 1300      Total Intake(mL/kg) 1435 3 (11) 360 (2 7) 281 5 (2 1)    Urine (mL/kg/hr) 2000 1900 (0 6) 500 (0 9)    Stool  0 0    Total Output 2000 1900 500    Net -564 8 -1540 -218 5           Unmeasured Stool Occurrence  1 x 1 x              Physical Exam    Constitutional:  Bhanu Clark appears well, alert and oriented x 3, pleasant and cooperative  No acute distress   HEENT:    Normocephalic, atraumatic   Neck:  Supple, No JVD   Lungs:  Clear to auscultation bilaterally, respirations unlabored    Chest Wall:  No tenderness or deformity, right basal rales and decreased breath sounds   Heart::  Irregularly, irregular, tachycardia+, S1 and S2 normal, no murmur, rub or gallop    Abdomen:  Soft, non-tender, non-distended   Neurological:  Awake, alert, oriented x3   Non-focal exam    Extremities:  B/L 1-2+ edema, No calf tenderness         Laboratory Results:  Results from last 7 days   Lab Units 08/26/19  1729 08/26/19  1239 08/26/19  0918   TROPONIN I ng/mL <0 03 <0 03 <0 03       CBC with diff:   Results from last 7 days   Lab Units 08/29/19  0452 08/28/19  0445 08/27/19  0503 08/26/19  0918   WBC Thousand/uL 8 90 10 60 14 30* 18 40*   HEMOGLOBIN g/dL 12 1 11 6* 12 1 13 5   HEMATOCRIT % 36 1* 34 3* 36 2* 40 7*   MCV fL 90 90 90 91   PLATELETS Thousands/uL 294 259 237 263   MCH pg 30 2 30 5 30 1 30 2   MCHC g/dL 33 6 33 8 33 3 33 2   RDW % 13 4 13 5 13 4 13 7   MPV fL 7 9* 8 3* 8 5* 8 7         CMP:  Results from last 7 days   Lab Units 08/29/19  0452 08/28/19  0445 08/27/19  0503 08/26/19  0918   POTASSIUM mmol/L 3 4* 3 6 3 7 3 6   CHLORIDE mmol/L 105 104 108* 101   CO2 mmol/L 28 27 22 24   BUN mg/dL 24 25 21 20   CREATININE mg/dL 0 76 0 85 0 75 0 83   CALCIUM mg/dL 8 8 8 7 8 7 9 4   AST U/L  --   --  18 13   ALT U/L  --   --  22 18   ALK PHOS U/L  --   --  116 98   EGFR ml/min/1 73sq m 80 70 81 72         BMP:  Results from last 7 days   Lab Units 08/29/19  0452 08/28/19 0445 08/27/19  0503 08/26/19  0918   POTASSIUM mmol/L 3 4* 3 6 3 7 3 6   CHLORIDE mmol/L 105 104 108* 101   CO2 mmol/L 28 27 22 24   BUN mg/dL 24 25 21 20   CREATININE mg/dL 0 76 0 85 0 75 0 83   CALCIUM mg/dL 8 8 8 7 8 7 9 4       BNP:   No results for input(s): BNP in the last 72 hours      Magnesium:   Results from last 7 days   Lab Units 08/29/19  0452 08/27/19  0503   MAGNESIUM mg/dL 2 3 2 2       Coags:   Results from last 7 days   Lab Units 08/26/19  0918   PTT seconds 34   INR  1 26       TSH:        Hemoglobin A1C       Lipid Profile:       Meds/Allergies   all current active meds have been reviewed and current meds:   Current Facility-Administered Medications   Medication Dose Route Frequency    acetaminophen (TYLENOL) tablet 650 mg  650 mg Oral Q6H PRN    apixaban (ELIQUIS) tablet 5 mg  5 mg Oral BID    atorvastatin (LIPITOR) tablet 40 mg  40 mg Oral Daily With Dinner    diltiazem (CARDIZEM) tablet 120 mg  120 mg Oral Q8H [facility]    diphenhydrAMINE (BENADRYL) tablet 25 mg  25 mg Oral HS PRN    docusate sodium (COLACE) capsule 100 mg  100 mg Oral BID    escitalopram (LEXAPRO) tablet 20 mg  20 mg Oral Daily    furosemide (LASIX) injection 40 mg  40 mg Intravenous TID (diuretic)    [START ON 8/30/2019] levofloxacin (LEVAQUIN) tablet 750 mg  750 mg Oral Q24H    melatonin tablet 3 mg  3 mg Oral HS    metoprolol (LOPRESSOR) injection 5 mg  5 mg Intravenous Q6H PRN    metoprolol succinate (TOPROL-XL) 24 hr tablet 100 mg  100 mg Oral Q12H    ondansetron (ZOFRAN) injection 4 mg  4 mg Intravenous Q6H PRN    potassium chloride (K-DUR,KLOR-CON) CR tablet 40 mEq  40 mEq Oral BID     Medications Prior to Admission   Medication    amLODIPine-benazepril (LOTREL) 10-40 MG per capsule    ASPIRIN 81 PO    atorvastatin (LIPITOR) 40 mg tablet    escitalopram (LEXAPRO) 20 mg tablet            Cardiac testing:   Results for orders placed during the hospital encounter of 19   Echo complete with contrast if indicated    Narrative [facility] [employer] [address]    Transthoracic Echocardiogram  2D, M-mode, Doppler, and Color Doppler    Study date:  26-Aug-2019    Patient: Tyrell Pena  MR number: [id number]  Account number: [de-identified]  : 1950  Age: 78 years  Gender: Female  Status: Inpatient  Location: [facility]   Height: 61 in  Weight: 290 4 lb  BP: 122/ 67 mmHg    Indications: Atrial fibrillation  Diagnoses: I48 0 - Atrial fibrillation    Sonographer:  Izaiah Silver RDCS  Referring Physician:  Virginia Martin MD  Group:  [facility]  Interpreting Physician:  Rae Xavier MD    SUMMARY    LEFT VENTRICLE:  Systolic function was normal  Ejection fraction was estimated to be 60 %  There were no regional wall motion abnormalities  RIGHT VENTRICLE:  Wall thickness was mildly increased  MITRAL VALVE:  There was mild regurgitation  HISTORY: Dyspnea  PROCEDURE: The study was performed in the [facility]  This was a stat study  The transthoracic approach was used  The study included complete 2D imaging, M-mode, complete spectral Doppler, and color Doppler  The heart  rate was 142 bpm, at the start of the study   Images were obtained from the parasternal, apical, subcostal, and suprasternal notch acoustic windows  Intravenous contrast ( 0 8 ml of Definity in NSS) was administered to opacify the left  ventricle  Echocardiographic views were limited due to restricted patient mobility, poor acoustic window availability, decreased penetration, and lung interference  This was a technically difficult study  LEFT VENTRICLE: Size was normal  Systolic function was normal  Ejection fraction was estimated to be 60 %  There were no regional wall motion abnormalities  Wall thickness was normal  No evidence of apical thrombus  DOPPLER: The study was  not technically sufficient to allow evaluation of LV diastolic function  RIGHT VENTRICLE: The size was normal  Systolic function was normal  Wall thickness was mildly increased  LEFT ATRIUM: Size was normal     RIGHT ATRIUM: Size was normal     MITRAL VALVE: Valve structure was normal  There was normal leaflet separation  DOPPLER: The transmitral velocity was within the normal range  There was no evidence for stenosis  There was mild regurgitation  AORTIC VALVE: The valve was trileaflet  Leaflets exhibited normal thickness and normal cuspal separation  DOPPLER: Transaortic velocity was within the normal range  There was no evidence for stenosis  There was no significant  regurgitation  TRICUSPID VALVE: The valve structure was normal  There was normal leaflet separation  DOPPLER: The transtricuspid velocity was within the normal range  There was no evidence for stenosis  There was no significant regurgitation  PULMONIC VALVE: Not well visualized  DOPPLER: The transpulmonic velocity was within the normal range  There was no significant regurgitation  PERICARDIUM: There was no pericardial effusion  The pericardium was normal in appearance  AORTA: The root exhibited normal size  SYSTEMIC VEINS: IVC: The inferior vena cava was normal in size      SYSTEM MEASUREMENT TABLES    2D  %FS: 30 72 %  AV Diam: 2 98 cm  Ao asc: 3 04 cm  EDV(Teich): 106 66 ml  EF(Teich): 58 19 %  ESV(Teich): 44 6 ml  IVSd: 1 29 cm  LA Area: 22 52 cm2  LA Diam: 4 44 cm  LVIDd: 4 78 cm  LVIDs: 3 31 cm  LVPWd: 1 3 cm  RA Area: 24 74 cm2  SI(Teich): 27 96 ml/m2  SV(Cube): 73 06 ml  SV(Teich): 62 07 ml    CW  TR Vmax: 3 12 m/s  TR maxP 88 mmHg    MM  TAPSE: 1 21 cm    [facility] Accredited Echocardi[facility]    Prepared and electronically signed by    Clarita Coleman MD  Signed 26-Aug-2019 15:58:59       No results found for this or any previous visit  No results found for this or any previous visit  No results found for this or any previous visit  Counseling / Coordination of Care  Total floor / unit time spent today 25 minutes

## 2021-05-08 ENCOUNTER — APPOINTMENT (OUTPATIENT)
Dept: CARDIOLOGY | Facility: MEDICAL CENTER | Age: 81
DRG: 871 | End: 2021-05-08
Attending: INTERNAL MEDICINE
Payer: MEDICARE

## 2021-05-08 PROBLEM — I50.9 ACUTE EXACERBATION OF CHF (CONGESTIVE HEART FAILURE) (HCC): Status: ACTIVE | Noted: 2021-05-08

## 2021-05-08 PROBLEM — D72.829 LEUKOCYTOSIS: Status: ACTIVE | Noted: 2021-05-08

## 2021-05-08 PROBLEM — J81.0 ACUTE PULMONARY EDEMA (HCC): Status: ACTIVE | Noted: 2021-05-08

## 2021-05-08 PROBLEM — J69.0 ASPIRATION PNEUMONIA (HCC): Status: ACTIVE | Noted: 2021-05-08

## 2021-05-08 PROBLEM — J44.1 CHRONIC OBSTRUCTIVE PULMONARY DISEASE WITH ACUTE EXACERBATION (HCC): Status: ACTIVE | Noted: 2021-05-08

## 2021-05-08 PROBLEM — F39 MOOD DISORDER (HCC): Status: ACTIVE | Noted: 2021-05-08

## 2021-05-08 PROBLEM — J96.21 ACUTE ON CHRONIC RESPIRATORY FAILURE WITH HYPOXIA (HCC): Status: ACTIVE | Noted: 2021-05-08

## 2021-05-08 PROBLEM — R73.9 HYPERGLYCEMIA: Status: ACTIVE | Noted: 2021-05-08

## 2021-05-08 PROBLEM — R54 AGE-RELATED PHYSICAL DEBILITY: Status: ACTIVE | Noted: 2021-05-08

## 2021-05-08 PROBLEM — A41.9 SEPSIS (HCC): Status: ACTIVE | Noted: 2021-05-08

## 2021-05-08 LAB
ALBUMIN SERPL BCP-MCNC: 4 G/DL (ref 3.2–4.9)
ALBUMIN/GLOB SERPL: 1.5 G/DL
ALP SERPL-CCNC: 91 U/L (ref 30–99)
ALT SERPL-CCNC: 15 U/L (ref 2–50)
ANION GAP SERPL CALC-SCNC: 9 MMOL/L (ref 7–16)
APPEARANCE UR: CLEAR
AST SERPL-CCNC: 15 U/L (ref 12–45)
BASOPHILS # BLD AUTO: 0.2 % (ref 0–1.8)
BASOPHILS # BLD: 0.03 K/UL (ref 0–0.12)
BILIRUB SERPL-MCNC: 0.8 MG/DL (ref 0.1–1.5)
BILIRUB UR QL STRIP.AUTO: NEGATIVE
BUN SERPL-MCNC: 13 MG/DL (ref 8–22)
CALCIUM SERPL-MCNC: 8.7 MG/DL (ref 8.5–10.5)
CHLORIDE SERPL-SCNC: 102 MMOL/L (ref 96–112)
CO2 SERPL-SCNC: 32 MMOL/L (ref 20–33)
COLOR UR: YELLOW
CORTIS SERPL-MCNC: 19.2 UG/DL (ref 0–23)
CREAT SERPL-MCNC: 0.91 MG/DL (ref 0.5–1.4)
DIGOXIN SERPL-MCNC: 1.1 NG/ML (ref 0.8–2)
EKG IMPRESSION: NORMAL
EKG IMPRESSION: NORMAL
EOSINOPHIL # BLD AUTO: 0.04 K/UL (ref 0–0.51)
EOSINOPHIL NFR BLD: 0.3 % (ref 0–6.9)
ERYTHROCYTE [DISTWIDTH] IN BLOOD BY AUTOMATED COUNT: 46.8 FL (ref 35.9–50)
EST. AVERAGE GLUCOSE BLD GHB EST-MCNC: 111 MG/DL
GLOBULIN SER CALC-MCNC: 2.7 G/DL (ref 1.9–3.5)
GLUCOSE BLD-MCNC: 128 MG/DL (ref 65–99)
GLUCOSE BLD-MCNC: 183 MG/DL (ref 65–99)
GLUCOSE BLD-MCNC: 226 MG/DL (ref 65–99)
GLUCOSE SERPL-MCNC: 131 MG/DL (ref 65–99)
GLUCOSE UR STRIP.AUTO-MCNC: NEGATIVE MG/DL
HBA1C MFR BLD: 5.5 % (ref 4–5.6)
HCT VFR BLD AUTO: 43.1 % (ref 37–47)
HGB BLD-MCNC: 13.4 G/DL (ref 12–16)
IMM GRANULOCYTES # BLD AUTO: 0.05 K/UL (ref 0–0.11)
IMM GRANULOCYTES NFR BLD AUTO: 0.4 % (ref 0–0.9)
KETONES UR STRIP.AUTO-MCNC: NEGATIVE MG/DL
LACTATE BLD-SCNC: 1.8 MMOL/L (ref 0.5–2)
LACTATE BLD-SCNC: 2.5 MMOL/L (ref 0.5–2)
LACTATE BLD-SCNC: 3.2 MMOL/L (ref 0.5–2)
LEUKOCYTE ESTERASE UR QL STRIP.AUTO: NEGATIVE
LV EJECT FRACT  99904: 65
LV EJECT FRACT MOD 2C 99903: 75.9
LV EJECT FRACT MOD 4C 99902: 74.73
LV EJECT FRACT MOD BP 99901: 75.67
LYMPHOCYTES # BLD AUTO: 1.13 K/UL (ref 1–4.8)
LYMPHOCYTES NFR BLD: 8.7 % (ref 22–41)
MAGNESIUM SERPL-MCNC: 2.1 MG/DL (ref 1.5–2.5)
MCH RBC QN AUTO: 29.3 PG (ref 27–33)
MCHC RBC AUTO-ENTMCNC: 31.1 G/DL (ref 33.6–35)
MCV RBC AUTO: 94.1 FL (ref 81.4–97.8)
MICRO URNS: NORMAL
MONOCYTES # BLD AUTO: 1.12 K/UL (ref 0–0.85)
MONOCYTES NFR BLD AUTO: 8.6 % (ref 0–13.4)
NEUTROPHILS # BLD AUTO: 10.58 K/UL (ref 2–7.15)
NEUTROPHILS NFR BLD: 81.8 % (ref 44–72)
NITRITE UR QL STRIP.AUTO: NEGATIVE
NRBC # BLD AUTO: 0 K/UL
NRBC BLD-RTO: 0 /100 WBC
PH UR STRIP.AUTO: 6.5 [PH] (ref 5–8)
PHOSPHATE SERPL-MCNC: 1.8 MG/DL (ref 2.5–4.5)
PLATELET # BLD AUTO: 265 K/UL (ref 164–446)
PMV BLD AUTO: 11.6 FL (ref 9–12.9)
POTASSIUM SERPL-SCNC: 3.5 MMOL/L (ref 3.6–5.5)
PROCALCITONIN SERPL-MCNC: <0.05 NG/ML
PROT SERPL-MCNC: 6.7 G/DL (ref 6–8.2)
PROT UR QL STRIP: NEGATIVE MG/DL
RBC # BLD AUTO: 4.58 M/UL (ref 4.2–5.4)
RBC UR QL AUTO: NEGATIVE
SODIUM SERPL-SCNC: 143 MMOL/L (ref 135–145)
SP GR UR STRIP.AUTO: 1.03
TSH SERPL DL<=0.005 MIU/L-ACNC: 3.31 UIU/ML (ref 0.38–5.33)
UROBILINOGEN UR STRIP.AUTO-MCNC: 0.2 MG/DL
WBC # BLD AUTO: 13 K/UL (ref 4.8–10.8)

## 2021-05-08 PROCEDURE — 87070 CULTURE OTHR SPECIMN AEROBIC: CPT

## 2021-05-08 PROCEDURE — 96365 THER/PROPH/DIAG IV INF INIT: CPT

## 2021-05-08 PROCEDURE — 82533 TOTAL CORTISOL: CPT

## 2021-05-08 PROCEDURE — 700105 HCHG RX REV CODE 258: Performed by: EMERGENCY MEDICINE

## 2021-05-08 PROCEDURE — 93010 ELECTROCARDIOGRAM REPORT: CPT | Performed by: INTERNAL MEDICINE

## 2021-05-08 PROCEDURE — 700101 HCHG RX REV CODE 250: Performed by: STUDENT IN AN ORGANIZED HEALTH CARE EDUCATION/TRAINING PROGRAM

## 2021-05-08 PROCEDURE — 80053 COMPREHEN METABOLIC PANEL: CPT

## 2021-05-08 PROCEDURE — 71275 CT ANGIOGRAPHY CHEST: CPT | Mod: ME

## 2021-05-08 PROCEDURE — 86738 MYCOPLASMA ANTIBODY: CPT

## 2021-05-08 PROCEDURE — 93306 TTE W/DOPPLER COMPLETE: CPT

## 2021-05-08 PROCEDURE — 87040 BLOOD CULTURE FOR BACTERIA: CPT | Mod: 91

## 2021-05-08 PROCEDURE — 700111 HCHG RX REV CODE 636 W/ 250 OVERRIDE (IP): Performed by: EMERGENCY MEDICINE

## 2021-05-08 PROCEDURE — 80162 ASSAY OF DIGOXIN TOTAL: CPT

## 2021-05-08 PROCEDURE — 99221 1ST HOSP IP/OBS SF/LOW 40: CPT | Mod: AI | Performed by: STUDENT IN AN ORGANIZED HEALTH CARE EDUCATION/TRAINING PROGRAM

## 2021-05-08 PROCEDURE — 92610 EVALUATE SWALLOWING FUNCTION: CPT

## 2021-05-08 PROCEDURE — 83605 ASSAY OF LACTIC ACID: CPT | Mod: 91

## 2021-05-08 PROCEDURE — 770020 HCHG ROOM/CARE - TELE (206)

## 2021-05-08 PROCEDURE — 84100 ASSAY OF PHOSPHORUS: CPT

## 2021-05-08 PROCEDURE — 94760 N-INVAS EAR/PLS OXIMETRY 1: CPT

## 2021-05-08 PROCEDURE — 700105 HCHG RX REV CODE 258: Performed by: STUDENT IN AN ORGANIZED HEALTH CARE EDUCATION/TRAINING PROGRAM

## 2021-05-08 PROCEDURE — A9270 NON-COVERED ITEM OR SERVICE: HCPCS | Performed by: STUDENT IN AN ORGANIZED HEALTH CARE EDUCATION/TRAINING PROGRAM

## 2021-05-08 PROCEDURE — 83036 HEMOGLOBIN GLYCOSYLATED A1C: CPT

## 2021-05-08 PROCEDURE — 93005 ELECTROCARDIOGRAM TRACING: CPT | Performed by: STUDENT IN AN ORGANIZED HEALTH CARE EDUCATION/TRAINING PROGRAM

## 2021-05-08 PROCEDURE — 96367 TX/PROPH/DG ADDL SEQ IV INF: CPT

## 2021-05-08 PROCEDURE — 36415 COLL VENOUS BLD VENIPUNCTURE: CPT

## 2021-05-08 PROCEDURE — 700101 HCHG RX REV CODE 250: Performed by: EMERGENCY MEDICINE

## 2021-05-08 PROCEDURE — 94664 DEMO&/EVAL PT USE INHALER: CPT

## 2021-05-08 PROCEDURE — 93306 TTE W/DOPPLER COMPLETE: CPT | Mod: 26 | Performed by: INTERNAL MEDICINE

## 2021-05-08 PROCEDURE — 83735 ASSAY OF MAGNESIUM: CPT

## 2021-05-08 PROCEDURE — 82962 GLUCOSE BLOOD TEST: CPT | Mod: 91

## 2021-05-08 PROCEDURE — 94640 AIRWAY INHALATION TREATMENT: CPT

## 2021-05-08 PROCEDURE — 700102 HCHG RX REV CODE 250 W/ 637 OVERRIDE(OP): Performed by: STUDENT IN AN ORGANIZED HEALTH CARE EDUCATION/TRAINING PROGRAM

## 2021-05-08 PROCEDURE — 84443 ASSAY THYROID STIM HORMONE: CPT

## 2021-05-08 PROCEDURE — 99223 1ST HOSP IP/OBS HIGH 75: CPT | Performed by: INTERNAL MEDICINE

## 2021-05-08 PROCEDURE — 81003 URINALYSIS AUTO W/O SCOPE: CPT

## 2021-05-08 PROCEDURE — 85025 COMPLETE CBC W/AUTO DIFF WBC: CPT

## 2021-05-08 PROCEDURE — 700117 HCHG RX CONTRAST REV CODE 255: Performed by: EMERGENCY MEDICINE

## 2021-05-08 PROCEDURE — 700111 HCHG RX REV CODE 636 W/ 250 OVERRIDE (IP): Performed by: INTERNAL MEDICINE

## 2021-05-08 PROCEDURE — 700111 HCHG RX REV CODE 636 W/ 250 OVERRIDE (IP): Performed by: STUDENT IN AN ORGANIZED HEALTH CARE EDUCATION/TRAINING PROGRAM

## 2021-05-08 RX ORDER — ATORVASTATIN CALCIUM 40 MG/1
40 TABLET, FILM COATED ORAL EVERY EVENING
Status: DISCONTINUED | OUTPATIENT
Start: 2021-05-08 | End: 2021-05-13 | Stop reason: HOSPADM

## 2021-05-08 RX ORDER — CYANOCOBALAMIN (VITAMIN B-12) 5000 MCG
5000 TABLET,DISINTEGRATING ORAL DAILY
COMMUNITY

## 2021-05-08 RX ORDER — LEVALBUTEROL INHALATION SOLUTION 1.25 MG/3ML
1.25 SOLUTION RESPIRATORY (INHALATION)
Status: DISCONTINUED | OUTPATIENT
Start: 2021-05-08 | End: 2021-05-09

## 2021-05-08 RX ORDER — METHYLPREDNISOLONE SODIUM SUCCINATE 40 MG/ML
40 INJECTION, POWDER, LYOPHILIZED, FOR SOLUTION INTRAMUSCULAR; INTRAVENOUS EVERY 8 HOURS
Status: DISCONTINUED | OUTPATIENT
Start: 2021-05-08 | End: 2021-05-09

## 2021-05-08 RX ORDER — SERTRALINE HYDROCHLORIDE 100 MG/1
100 TABLET, FILM COATED ORAL DAILY
Status: DISCONTINUED | OUTPATIENT
Start: 2021-05-08 | End: 2021-05-13 | Stop reason: HOSPADM

## 2021-05-08 RX ORDER — LEVALBUTEROL INHALATION SOLUTION 0.63 MG/3ML
0.63 SOLUTION RESPIRATORY (INHALATION)
Status: DISCONTINUED | OUTPATIENT
Start: 2021-05-08 | End: 2021-05-08 | Stop reason: ALTCHOICE

## 2021-05-08 RX ORDER — FUROSEMIDE 10 MG/ML
40 INJECTION INTRAMUSCULAR; INTRAVENOUS
Status: DISCONTINUED | OUTPATIENT
Start: 2021-05-08 | End: 2021-05-08

## 2021-05-08 RX ORDER — BIOTIN 1 MG
1000 TABLET ORAL DAILY
COMMUNITY

## 2021-05-08 RX ORDER — ALBUTEROL SULFATE 90 UG/1
2 AEROSOL, METERED RESPIRATORY (INHALATION)
Status: DISCONTINUED | OUTPATIENT
Start: 2021-05-08 | End: 2021-05-13 | Stop reason: HOSPADM

## 2021-05-08 RX ORDER — DIGOXIN 125 MCG
125 TABLET ORAL DAILY
Status: DISCONTINUED | OUTPATIENT
Start: 2021-05-08 | End: 2021-05-13 | Stop reason: HOSPADM

## 2021-05-08 RX ORDER — AMOXICILLIN 250 MG
2 CAPSULE ORAL 2 TIMES DAILY
Status: DISCONTINUED | OUTPATIENT
Start: 2021-05-08 | End: 2021-05-13 | Stop reason: HOSPADM

## 2021-05-08 RX ORDER — WARFARIN SODIUM 2.5 MG/1
2.5 TABLET ORAL
Status: DISCONTINUED | OUTPATIENT
Start: 2021-05-11 | End: 2021-05-13 | Stop reason: HOSPADM

## 2021-05-08 RX ORDER — LOSARTAN POTASSIUM 25 MG/1
25 TABLET ORAL
Status: DISCONTINUED | OUTPATIENT
Start: 2021-05-08 | End: 2021-05-10

## 2021-05-08 RX ORDER — IPRATROPIUM BROMIDE AND ALBUTEROL SULFATE 2.5; .5 MG/3ML; MG/3ML
3 SOLUTION RESPIRATORY (INHALATION)
Status: DISCONTINUED | OUTPATIENT
Start: 2021-05-08 | End: 2021-05-08 | Stop reason: ALTCHOICE

## 2021-05-08 RX ORDER — METOPROLOL TARTRATE 1 MG/ML
5 INJECTION, SOLUTION INTRAVENOUS
Status: DISCONTINUED | OUTPATIENT
Start: 2021-05-08 | End: 2021-05-13 | Stop reason: HOSPADM

## 2021-05-08 RX ORDER — FUROSEMIDE 10 MG/ML
20 INJECTION INTRAMUSCULAR; INTRAVENOUS
Status: DISCONTINUED | OUTPATIENT
Start: 2021-05-08 | End: 2021-05-12

## 2021-05-08 RX ORDER — LEVALBUTEROL INHALATION SOLUTION 1.25 MG/3ML
1.25 SOLUTION RESPIRATORY (INHALATION)
Status: DISCONTINUED | OUTPATIENT
Start: 2021-05-08 | End: 2021-05-08

## 2021-05-08 RX ORDER — IPRATROPIUM BROMIDE AND ALBUTEROL SULFATE 2.5; .5 MG/3ML; MG/3ML
3 SOLUTION RESPIRATORY (INHALATION)
Status: DISCONTINUED | OUTPATIENT
Start: 2021-05-08 | End: 2021-05-08

## 2021-05-08 RX ORDER — ANTIOX #8/OM3/DHA/EPA/LUT/ZEAX 250-2.5 MG
1 CAPSULE ORAL DAILY
COMMUNITY

## 2021-05-08 RX ORDER — POLYETHYLENE GLYCOL 3350 17 G/17G
1 POWDER, FOR SOLUTION ORAL
Status: DISCONTINUED | OUTPATIENT
Start: 2021-05-08 | End: 2021-05-13 | Stop reason: HOSPADM

## 2021-05-08 RX ORDER — SODIUM CHLORIDE, SODIUM LACTATE, POTASSIUM CHLORIDE, AND CALCIUM CHLORIDE .6; .31; .03; .02 G/100ML; G/100ML; G/100ML; G/100ML
500 INJECTION, SOLUTION INTRAVENOUS
Status: DISCONTINUED | OUTPATIENT
Start: 2021-05-08 | End: 2021-05-13 | Stop reason: HOSPADM

## 2021-05-08 RX ORDER — LABETALOL HYDROCHLORIDE 5 MG/ML
10 INJECTION, SOLUTION INTRAVENOUS EVERY 4 HOURS PRN
Status: DISCONTINUED | OUTPATIENT
Start: 2021-05-08 | End: 2021-05-13 | Stop reason: HOSPADM

## 2021-05-08 RX ORDER — ONDANSETRON 4 MG/1
4 TABLET, ORALLY DISINTEGRATING ORAL EVERY 4 HOURS PRN
Status: DISCONTINUED | OUTPATIENT
Start: 2021-05-08 | End: 2021-05-13 | Stop reason: HOSPADM

## 2021-05-08 RX ORDER — BISACODYL 10 MG
10 SUPPOSITORY, RECTAL RECTAL
Status: DISCONTINUED | OUTPATIENT
Start: 2021-05-08 | End: 2021-05-13 | Stop reason: HOSPADM

## 2021-05-08 RX ORDER — AMOXICILLIN 500 MG/1
500 CAPSULE ORAL 3 TIMES DAILY
Status: ON HOLD | COMMUNITY
End: 2021-05-11

## 2021-05-08 RX ORDER — METOPROLOL SUCCINATE 50 MG/1
100 TABLET, EXTENDED RELEASE ORAL
Status: DISCONTINUED | OUTPATIENT
Start: 2021-05-08 | End: 2021-05-13 | Stop reason: HOSPADM

## 2021-05-08 RX ORDER — ONDANSETRON 2 MG/ML
4 INJECTION INTRAMUSCULAR; INTRAVENOUS EVERY 4 HOURS PRN
Status: DISCONTINUED | OUTPATIENT
Start: 2021-05-08 | End: 2021-05-13 | Stop reason: HOSPADM

## 2021-05-08 RX ORDER — WARFARIN SODIUM 5 MG/1
5 TABLET ORAL
Status: DISCONTINUED | OUTPATIENT
Start: 2021-05-08 | End: 2021-05-13 | Stop reason: HOSPADM

## 2021-05-08 RX ORDER — DEXTROSE MONOHYDRATE 25 G/50ML
50 INJECTION, SOLUTION INTRAVENOUS
Status: DISCONTINUED | OUTPATIENT
Start: 2021-05-08 | End: 2021-05-13 | Stop reason: HOSPADM

## 2021-05-08 RX ORDER — FLUTICASONE PROPIONATE 50 MCG
1 SPRAY, SUSPENSION (ML) NASAL
COMMUNITY
End: 2021-06-08

## 2021-05-08 RX ORDER — AZITHROMYCIN 250 MG/1
500 TABLET, FILM COATED ORAL DAILY
Status: COMPLETED | OUTPATIENT
Start: 2021-05-09 | End: 2021-05-10

## 2021-05-08 RX ORDER — BENZONATATE 100 MG/1
100 CAPSULE ORAL 3 TIMES DAILY PRN
Status: DISCONTINUED | OUTPATIENT
Start: 2021-05-08 | End: 2021-05-13 | Stop reason: HOSPADM

## 2021-05-08 RX ORDER — LORATADINE 10 MG/1
10 TABLET ORAL DAILY
Status: DISCONTINUED | OUTPATIENT
Start: 2021-05-08 | End: 2021-05-13 | Stop reason: HOSPADM

## 2021-05-08 RX ORDER — ACETAMINOPHEN 325 MG/1
650 TABLET ORAL EVERY 6 HOURS PRN
Status: DISCONTINUED | OUTPATIENT
Start: 2021-05-08 | End: 2021-05-13 | Stop reason: HOSPADM

## 2021-05-08 RX ORDER — FLECAINIDE ACETATE 100 MG/1
100 TABLET ORAL 2 TIMES DAILY
Status: DISCONTINUED | OUTPATIENT
Start: 2021-05-08 | End: 2021-05-13 | Stop reason: HOSPADM

## 2021-05-08 RX ADMIN — ALBUTEROL SULFATE 2.5 MG: 2.5 SOLUTION RESPIRATORY (INHALATION) at 00:34

## 2021-05-08 RX ADMIN — INSULIN HUMAN 2 UNITS: 100 INJECTION, SOLUTION PARENTERAL at 18:01

## 2021-05-08 RX ADMIN — METOPROLOL SUCCINATE 100 MG: 50 TABLET, EXTENDED RELEASE ORAL at 05:27

## 2021-05-08 RX ADMIN — IOHEXOL 100 ML: 350 INJECTION, SOLUTION INTRAVENOUS at 02:03

## 2021-05-08 RX ADMIN — METHYLPREDNISOLONE SODIUM SUCCINATE 40 MG: 40 INJECTION, POWDER, FOR SOLUTION INTRAMUSCULAR; INTRAVENOUS at 13:07

## 2021-05-08 RX ADMIN — LOSARTAN POTASSIUM 25 MG: 25 TABLET, FILM COATED ORAL at 05:30

## 2021-05-08 RX ADMIN — FLECAINIDE ACETATE 100 MG: 100 TABLET ORAL at 17:59

## 2021-05-08 RX ADMIN — ATORVASTATIN CALCIUM 40 MG: 40 TABLET, FILM COATED ORAL at 17:59

## 2021-05-08 RX ADMIN — LEVALBUTEROL 1.25 MG: 1.25 SOLUTION RESPIRATORY (INHALATION) at 23:32

## 2021-05-08 RX ADMIN — WARFARIN SODIUM 5 MG: 5 TABLET ORAL at 17:59

## 2021-05-08 RX ADMIN — LEVALBUTEROL 1.25 MG: 1.25 SOLUTION RESPIRATORY (INHALATION) at 20:16

## 2021-05-08 RX ADMIN — AMPICILLIN SODIUM AND SULBACTAM SODIUM 3 G: 2; 1 INJECTION, POWDER, FOR SOLUTION INTRAMUSCULAR; INTRAVENOUS at 11:21

## 2021-05-08 RX ADMIN — LORATADINE 10 MG: 10 TABLET ORAL at 05:26

## 2021-05-08 RX ADMIN — FLECAINIDE ACETATE 100 MG: 100 TABLET ORAL at 05:26

## 2021-05-08 RX ADMIN — AMPICILLIN SODIUM AND SULBACTAM SODIUM 3 G: 2; 1 INJECTION, POWDER, FOR SOLUTION INTRAMUSCULAR; INTRAVENOUS at 19:06

## 2021-05-08 RX ADMIN — DIGOXIN 125 MCG: 125 TABLET ORAL at 05:42

## 2021-05-08 RX ADMIN — SERTRALINE HYDROCHLORIDE 100 MG: 100 TABLET ORAL at 05:27

## 2021-05-08 RX ADMIN — METHYLPREDNISOLONE SODIUM SUCCINATE 40 MG: 40 INJECTION, POWDER, FOR SOLUTION INTRAMUSCULAR; INTRAVENOUS at 22:12

## 2021-05-08 RX ADMIN — INSULIN HUMAN 1 UNITS: 100 INJECTION, SOLUTION PARENTERAL at 12:58

## 2021-05-08 RX ADMIN — IPRATROPIUM BROMIDE 0.5 MG: 0.5 SOLUTION RESPIRATORY (INHALATION) at 07:54

## 2021-05-08 RX ADMIN — POTASSIUM PHOSPHATE, MONOBASIC AND POTASSIUM PHOSPHATE, DIBASIC 30 MMOL: 224; 236 INJECTION, SOLUTION, CONCENTRATE INTRAVENOUS at 11:21

## 2021-05-08 RX ADMIN — AZITHROMYCIN MONOHYDRATE 500 MG: 500 INJECTION, POWDER, LYOPHILIZED, FOR SOLUTION INTRAVENOUS at 00:29

## 2021-05-08 RX ADMIN — METHYLPREDNISOLONE SODIUM SUCCINATE 40 MG: 40 INJECTION, POWDER, FOR SOLUTION INTRAMUSCULAR; INTRAVENOUS at 05:25

## 2021-05-08 RX ADMIN — FUROSEMIDE 20 MG: 10 INJECTION, SOLUTION INTRAMUSCULAR; INTRAVENOUS at 04:58

## 2021-05-08 RX ADMIN — AMPICILLIN SODIUM AND SULBACTAM SODIUM 3 G: 2; 1 INJECTION, POWDER, FOR SOLUTION INTRAMUSCULAR; INTRAVENOUS at 05:38

## 2021-05-08 RX ADMIN — AMPICILLIN AND SULBACTAM 3 G: 2; 1 INJECTION, POWDER, FOR SOLUTION INTRAVENOUS at 00:02

## 2021-05-08 ASSESSMENT — COGNITIVE AND FUNCTIONAL STATUS - GENERAL
CLIMB 3 TO 5 STEPS WITH RAILING: A LITTLE
DRESSING REGULAR LOWER BODY CLOTHING: A LITTLE
MOVING FROM LYING ON BACK TO SITTING ON SIDE OF FLAT BED: A LITTLE
STANDING UP FROM CHAIR USING ARMS: A LITTLE
SUGGESTED CMS G CODE MODIFIER MOBILITY: CJ
DAILY ACTIVITIY SCORE: 21
HELP NEEDED FOR BATHING: A LITTLE
DRESSING REGULAR UPPER BODY CLOTHING: A LITTLE
MOBILITY SCORE: 20
SUGGESTED CMS G CODE MODIFIER DAILY ACTIVITY: CJ
WALKING IN HOSPITAL ROOM: A LITTLE

## 2021-05-08 ASSESSMENT — LIFESTYLE VARIABLES
AVERAGE NUMBER OF DAYS PER WEEK YOU HAVE A DRINK CONTAINING ALCOHOL: 0
EVER HAD A DRINK FIRST THING IN THE MORNING TO STEADY YOUR NERVES TO GET RID OF A HANGOVER: NO
TOTAL SCORE: 0
HOW MANY TIMES IN THE PAST YEAR HAVE YOU HAD 5 OR MORE DRINKS IN A DAY: 0
TOTAL SCORE: 0
ALCOHOL_USE: NO
EVER FELT BAD OR GUILTY ABOUT YOUR DRINKING: NO
CONSUMPTION TOTAL: NEGATIVE
TOTAL SCORE: 0
HAVE PEOPLE ANNOYED YOU BY CRITICIZING YOUR DRINKING: NO
ON A TYPICAL DAY WHEN YOU DRINK ALCOHOL HOW MANY DRINKS DO YOU HAVE: 0
HAVE YOU EVER FELT YOU SHOULD CUT DOWN ON YOUR DRINKING: NO
EVER_SMOKED: YES

## 2021-05-08 ASSESSMENT — ENCOUNTER SYMPTOMS
FALLS: 0
COUGH: 1
VOMITING: 0
FLANK PAIN: 0
BLURRED VISION: 0
HEARTBURN: 0
BRUISES/BLEEDS EASILY: 0
FEVER: 0
DOUBLE VISION: 0
HEMOPTYSIS: 0
MYALGIAS: 0
NAUSEA: 1
DEPRESSION: 0
CHILLS: 0
SPUTUM PRODUCTION: 1
ORTHOPNEA: 1
PALPITATIONS: 0
ABDOMINAL PAIN: 0
WHEEZING: 1
HEADACHES: 0
SHORTNESS OF BREATH: 1
DIZZINESS: 0

## 2021-05-08 ASSESSMENT — COPD QUESTIONNAIRES
COPD SCREENING SCORE: 5
DURING THE PAST 4 WEEKS HOW MUCH DID YOU FEEL SHORT OF BREATH: NONE/LITTLE OF THE TIME
DO YOU EVER COUGH UP ANY MUCUS OR PHLEGM?: YES, A FEW DAYS A WEEK OR MONTH
HAVE YOU SMOKED AT LEAST 100 CIGARETTES IN YOUR ENTIRE LIFE: YES

## 2021-05-08 ASSESSMENT — CHA2DS2 SCORE
DIABETES: NO
CHA2DS2 VASC SCORE: 5
PRIOR STROKE OR TIA OR THROMBOEMBOLISM: NO
AGE 65 TO 74: NO
AGE 75 OR GREATER: YES
HYPERTENSION: YES
CHF OR LEFT VENTRICULAR DYSFUNCTION: YES
VASCULAR DISEASE: NO
SEX: FEMALE

## 2021-05-08 ASSESSMENT — PATIENT HEALTH QUESTIONNAIRE - PHQ9
SUM OF ALL RESPONSES TO PHQ9 QUESTIONS 1 AND 2: 0
1. LITTLE INTEREST OR PLEASURE IN DOING THINGS: NOT AT ALL
2. FEELING DOWN, DEPRESSED, IRRITABLE, OR HOPELESS: NOT AT ALL
1. LITTLE INTEREST OR PLEASURE IN DOING THINGS: NOT AT ALL
SUM OF ALL RESPONSES TO PHQ9 QUESTIONS 1 AND 2: 0

## 2021-05-08 ASSESSMENT — PAIN DESCRIPTION - PAIN TYPE: TYPE: ACUTE PAIN

## 2021-05-08 ASSESSMENT — FIBROSIS 4 INDEX: FIB4 SCORE: 1.15

## 2021-05-08 NOTE — ASSESSMENT & PLAN NOTE
B/l pleural effusions noted on CTA PE  BNP 1305    S/p Lasix 40mg IV x1 at [facility] prior to airlift to [facility]  Cont Lasix 20mg IV daily  Strict I&O, daily weights, Na/Fluid restriction    Cont BB/ARB  Repeat echo with EF of 65%, TR and RVSP of 46  Cardiology f/u appreciated

## 2021-05-08 NOTE — PROGRESS NOTES
2 RN skin check complete with Huyen  Devices in place: silicone nasal cannula, tele box, and peripheral IV  Skin assessed under devices.  Redness and blanching to L ear  Scar on R knee  Bruising to RLE  Edema to BLE   No confirmed pressure ulcers found.  No new potential pressure ulcers. No wound consult needed to be placed.  The following interventions in place: pillows in place for support/repositioning

## 2021-05-08 NOTE — ASSESSMENT & PLAN NOTE
S/p DCCVN 5/6/2021, underwent cardioversion on 5/10/21  Currently in sinus [facility]    OAC: warfarin -- dosing and INR monitoring as per pharmacy  Cont Toprolol XL, Digoxin, flecainide  Repeat echo as per cardiology

## 2021-05-08 NOTE — ASSESSMENT & PLAN NOTE
This is Sepsis Present on admission  SIRS criteria identified on my evaluation include: Tachycardia, with heart rate greater than 90 BPM, Leukocytosis, with WBC greater than 12,000 and Bandemia, greater than 10% bands  Source is pulm  Suspected onset of infection (date and time) 5/8/2021  Sepsis protocol initiated  Fluid resuscitation ordered per protocol  IV antibiotics as appropriate for source of sepsis  While organ dysfunction may be noted elsewhere in this problem list or in the chart, degree of organ dysfunction does not meet CMS criteria for severe sepsis

## 2021-05-08 NOTE — PROGRESS NOTES
Received report.  Patient arrived on floor ans assumed care at 0320. Patient placed on tele box and monitor room notified. This pt is AOx4, ambulatory with standby assistance, voiding adequately, 0/10 pain. Patient and RN discussed plan of care: questions answered. Labs noted, assessment complete, patient tolerating level 4- pureed diet with level 1- slightly thick liquids.  Pt is on 2 L of O2 via nasal cannula. Call light in place, fall precautions in place, patient educated on importance of calling for assistance. No additional needs at this time. VSS

## 2021-05-08 NOTE — CONSULTS
Reason for Consult:  Asked by Dr Mckeon to see this patient with shortness of breath    CC:   Chief Complaint   Patient presents with   • Shortness of Breath     Pt is a flight transfer from [facility], was there yesterday and cardioverted out of Afib to a sinus rhythm. Pt went back today for increasing shortness of breath. Sent here for an acute CHF exacerbation. Pt has a wet, congested cough and was given lasix at the prior facility.        HPI:      80 year old woman with PMH HTN, HLD, DM, hx CVA, paroxysmal AF s/p DCCV 05/06/2021 presents with shortness of breath.    Describes feeling relatively well leading to cardioversion. Thereafter, developed increased shortness of breath. Does admit to cough which is minimally productive. She denies orthopnea and pnd. She states her legs are usually fairly swollen. No increase in weight. Minimal response to rescue inhaler. States has 40 pack year smoking history but was not tested for emphysema. Presented for further management to osh. Transferred for the same.     Medications / Drug list prior to admission:  No current facility-administered medications on file prior to encounter.     Current Outpatient Medications on File Prior to Encounter   Medication Sig Dispense Refill   • loratadine (CLARITIN) 10 MG Tab Take 10 mg by mouth every day.     • BIOTIN PO Take  by mouth.     • losartan (COZAAR) 25 MG Tab Take 25 mg by mouth.     • flecainide (TAMBOCOR) 100 MG Tab Take 1 tablet by mouth 2 times a day. 60 tablet 11   • metoprolol SR (TOPROL XL) 100 MG TABLET SR 24 HR Take 1 tablet by mouth once daily 90 tablet 0   • digoxin (LANOXIN) 125 MCG Tab Take 1 Tab by mouth every day. 30 Tab 11   • warfarin (COUMADIN) 5 MG Tab TAKE 1 TO 1 & 1/2 (ONE TO ONE & ONE-HALF) TABLETS BY MOUTH ONCE DAILY 135 Tab 3   • atorvastatin (LIPITOR) 40 MG Tab Take 1 tablet by mouth once daily 90 Tab 3   • albuterol (VENTOLIN HFA) 108 (90 Base) MCG/ACT Aero Soln inhalation aerosol Ventolin HFA  90 mcg/actuation aerosol inhaler 8.5 g 11   • Multiple Vitamins-Minerals (VISION FORMULA 2 PO) Take  by mouth every day.     • levalbuterol (XOPENEX HFA) 45 MCG/ACT inhaler Inhale 1-2 Puffs by mouth. Pt states 1-2 puffs as needed      • SERTRALINE 100 MG TABS Take 100 mg by mouth every day.     • VITAMIN D3 Take 5,000 Units by mouth every day.         Current list of administered Medications:    Current Facility-Administered Medications:   •  senna-docusate (PERICOLACE or SENOKOT S) 8.6-50 MG per tablet 2 tablet, 2 tablet, Oral, BID **AND** polyethylene glycol/lytes (MIRALAX) PACKET 1 Packet, 1 Packet, Oral, QDAY PRN **AND** magnesium hydroxide (MILK OF MAGNESIA) suspension 30 mL, 30 mL, Oral, QDAY PRN **AND** bisacodyl (DULCOLAX) suppository 10 mg, 10 mg, Rectal, QDAY PRN, Garfield Snyder M.D.  •  Respiratory Therapy Consult, , Nebulization, Continuous RT, Garfield Snyder M.D.  •  Respiratory Therapy Consult, , Nebulization, Continuous RT, Garfield Snyder M.D.  •  lactated ringers infusion (BOLUS), 500 mL, Intravenous, Once PRN, Garfield Snyder M.D.  •  acetaminophen (Tylenol) tablet 650 mg, 650 mg, Oral, Q6HRS PRN, Garfield Snyder M.D.  •  azithromycin (ZITHROMAX) tablet 500 mg, 500 mg, Oral, DAILY, Garfield Snyder M.D.  •  ampicillin/sulbactam (UNASYN) 3 g in  mL IVPB, 3 g, Intravenous, Q6HRS, Garfiedl Snyder M.D.  •  labetalol (NORMODYNE/TRANDATE) injection 10 mg, 10 mg, Intravenous, Q4HRS PRN, Garfield Snyder M.D.  •  albuterol inhaler 2 Puff, 2 Puff, Inhalation, Q4H PRN (RT), Garfield Snyder M.D.  •  atorvastatin (LIPITOR) tablet 40 mg, 40 mg, Oral, DAILY, Garfield Snyder M.D.  •  digoxin (LANOXIN) tablet 125 mcg, 125 mcg, Oral, DAILY, Garfield Snyder M.D.  •  flecainide (TAMBOCOR) tablet 100 mg, 100 mg, Oral, BID, Garfield Snyder M.D.  •  loratadine (CLARITIN) tablet 10 mg, 10 mg, Oral, DAILY, Garfield Snyder M.D.  •  losartan (COZAAR) tablet 25 mg, 25 mg, Oral, Q DAY, Garfield Snyder M.D.  •  metoprolol SR (TOPROL XL) tablet 100  mg, 100 mg, Oral, Q DAY, Garfield Snyder M.D.  •  sertraline (Zoloft) tablet 100 mg, 100 mg, Oral, DAILY, Garfield Snyder M.D.  •  MD Alert...Warfarin per Pharmacy, , Other, PHARMACY TO DOSE, Garfield Snyder M.D.  •  ondansetron (ZOFRAN) syringe/vial injection 4 mg, 4 mg, Intravenous, Q4HRS PRN, Garfield Snyder M.D.  •  ondansetron (ZOFRAN ODT) dispertab 4 mg, 4 mg, Oral, Q4HRS PRN, Garfield Snyder M.D.  •  Metoprolol Tartrate (LOPRESSOR) injection 5 mg, 5 mg, Intravenous, Q5 MIN PRN, Garfield Snyder M.D.  •  ipratropium (ATROVENT) 0.02 % nebulizer solution 0.5 mg, 0.5 mg, Nebulization, Q6HRS (RT), Garfield Snyder M.D.  •  ipratropium (ATROVENT) 0.02 % nebulizer solution 0.5 mg, 0.5 mg, Nebulization, Q4HRS PRN, Garfield Snyder M.D.  •  furosemide (LASIX) injection 40 mg, 40 mg, Intravenous, Q DAY, Ezra Fitzgerald M.D.    Current Outpatient Medications:   •  loratadine (CLARITIN) 10 MG Tab, Take 10 mg by mouth every day., Disp: , Rfl:   •  BIOTIN PO, Take  by mouth., Disp: , Rfl:   •  losartan (COZAAR) 25 MG Tab, Take 25 mg by mouth., Disp: , Rfl:   •  flecainide (TAMBOCOR) 100 MG Tab, Take 1 tablet by mouth 2 times a day., Disp: 60 tablet, Rfl: 11  •  metoprolol SR (TOPROL XL) 100 MG TABLET SR 24 HR, Take 1 tablet by mouth once daily, Disp: 90 tablet, Rfl: 0  •  digoxin (LANOXIN) 125 MCG Tab, Take 1 Tab by mouth every day., Disp: 30 Tab, Rfl: 11  •  warfarin (COUMADIN) 5 MG Tab, TAKE 1 TO 1 & 1/2 (ONE TO ONE & ONE-HALF) TABLETS BY MOUTH ONCE DAILY, Disp: 135 Tab, Rfl: 3  •  atorvastatin (LIPITOR) 40 MG Tab, Take 1 tablet by mouth once daily, Disp: 90 Tab, Rfl: 3  •  albuterol (VENTOLIN HFA) 108 (90 Base) MCG/ACT Aero Soln inhalation aerosol, Ventolin HFA 90 mcg/actuation aerosol inhaler, Disp: 8.5 g, Rfl: 11  •  Multiple Vitamins-Minerals (VISION FORMULA 2 PO), Take  by mouth every day., Disp: , Rfl:   •  levalbuterol (XOPENEX HFA) 45 MCG/ACT inhaler, Inhale 1-2 Puffs by mouth. Pt states 1-2 puffs as needed , Disp: , Rfl:   •   "SERTRALINE 100 MG TABS, Take 100 mg by mouth every day., Disp: , Rfl:   •  VITAMIN D3, Take 5,000 Units by mouth every day., Disp: , Rfl:     Past Medical History:   Diagnosis Date   • Arrhythmia 03/2015    A-fib takes flecainide   • Arthritis     fingers- osteo   • Breath shortness     02 @ 2L NOC   • Bronchial asthma 6/29/2012    Inhalers prn   • Bronchitis    • Cancer (HCC) 2005    squamous cell skin left chest   • Cataract     galo IOL   • Cerebral atherosclerosis    • Cold     Pt had \"head cold 6/1, surgery cristofer from 6/4 to 6/7, pt denies productive cough and SOB   • Dependence on supplemental oxygen     2 liters at night.   • Dizziness and giddiness     Episodic since ~2009   • Dyslipidemia, goal LDL below 100 6/29/2012   • High cholesterol    • History of tobacco use    • HTN (hypertension) 6/29/2012   • Obesity 6/29/2012   • Pneumonia     2015   • Stroke (HCC) 2010 2010-2017, multiple TIA's, generalized weakness   • TIA (transient ischemic attack) 6/29/2012   • Urinary incontinence        Past Surgical History:   Procedure Laterality Date   • KNEE ARTHROPLASTY TOTAL Right 6/7/2018    Procedure: KNEE ARTHROPLASTY TOTAL;  Surgeon: Eric Bunn M.D.;  Location: SURGERY [facility];  Service: Orthopedics   • CATARACT PHACO WITH IOL  6/4/2013    Performed by Noe Jean M.D. at SURGERY [facility]   • CATARACT PHACO WITH IOL  5/21/2013    Performed by Noe Jean M.D. at [facility]   • SEPTOPLASTY  8/19/2009    Performed by PABLITO LORENZANA at SURGERY [facility]   • SOMNOPLASTY  8/19/2009    Performed by PABLITO LORENZANA at SURGERY [facility]   • ANTROSTOMY  8/19/2009    Performed by PABLITO LORENZANA at SURGERY [facility]   • ETHMOIDECTOMY  8/19/2009    Performed by PABLITO LORENZANA at SURGERY [facility]   • OTHER  2008    sinus   • COLON RESECTION  2007   • CHOLECYSTECTOMY  1988   • APPENDECTOMY  1954   • OTHER  1943    mastoid   • GANGLION " EXCISION Bilateral 1975    left wrist, right finger   • HYSTERECTOMY, VAGINAL     • MASTOIDECTOMY     • PB CHOLECYSTOENTEROSTOMY+VIVIANA-EN-Y     • PB NASAL SCOPY,REPB CSF LEAK,SPHENOID     • WRIST FUSION         No family history on file.  Patient family history was personally reviewed, no pertinent family history to current presentation    Social History     Socioeconomic History   • Marital status:      Spouse name: Not on file   • Number of children: Not on file   • Years of education: Not on file   • Highest education level: Not on file   Occupational History   • Not on file   Tobacco Use   • Smoking status: Former Smoker     Packs/day: 1.00     Years: 40.00     Pack years: 40.00     Types: Cigarettes     Quit date: 1999     Years since quittin.7   • Smokeless tobacco: Never Used   Substance and Sexual Activity   • Alcohol use: No   • Drug use: No   • Sexual activity: Yes     Partners: Male   Other Topics Concern   • Not on file   Social History Narrative   • Not on file     Social Determinants of Health     Financial Resource Strain:    • Difficulty of Paying Living Expenses:    Food Insecurity:    • Worried About Running Out of Food in the Last Year:    • Ran Out of Food in the Last Year:    Transportation Needs:    • Lack of Transportation (Medical):    • Lack of Transportation (Non-Medical):    Physical Activity:    • Days of Exercise per Week:    • Minutes of Exercise per Session:    Stress:    • Feeling of Stress :    Social Connections:    • Frequency of Communication with Friends and Family:    • Frequency of Social Gatherings with Friends and Family:    • Attends Mandaeism Services:    • Active Member of Clubs or Organizations:    • Attends Club or Organization Meetings:    • Marital Status:    Intimate Partner Violence:    • Fear of Current or Ex-Partner:    • Emotionally Abused:    • Physically Abused:    • Sexually Abused:        ALLERGIES:  Allergies   Allergen Reactions   •  "Feldene [Piroxicam] Photosensitivity   • Sulfa Drugs Hives   • Codeine Anxiety       Review of systems:  A complete review of symptoms was reviewed with patient. This is reviewed in H&P and PMH. ALL OTHERS reviewed and negative    Physical exam:  Patient Vitals for the past 24 hrs:   BP Temp Temp src Pulse Resp SpO2 Height Weight   21 0100 136/81 -- -- 83 20 95 % -- --   21 0034 -- -- -- 73 (!) 22 95 % -- --   21 0000 137/61 -- -- 80 18 93 % -- --   21 2300 139/99 -- -- 76 16 99 % -- --   21 2214 156/90 36.9 °C (98.4 °F) Temporal 75 (!) 26 93 % -- --   21 2211 -- -- -- -- -- -- 1.626 m (5' 4\") 88.3 kg (194 lb 10.7 oz)     General: No acute distress.   EYES: no jaundice  HEENT: OP clear   Neck: No bruits No JVD.   CVS:  RRR. S1 + S2. No M/R/G. No edema.  Resp: CTAB. No wheezing or crackles/rhonchi.  Abdomen: Soft, NT, ND,  Skin: Grossly nothing acute no obvious rashes  Neurological: Alert, Moves all extremities, no cranial nerve defects on limited exam  Extremities: Pulse 2+ in b/l LE. No cyanosis.     Data:  Laboratory studies personally reviewed by me:  Recent Results (from the past 24 hour(s))   EKG    Collection Time: 21 10:07 PM   Result Value Ref Range    Report       [facility] Emergency Dept.    Test Date:  2021  Pt Name:    ANNA COOPER                Department: ER  MRN:        [id number]                      Room:        06  Gender:     Female                       Technician: [id number]  :        [id number]                   Requested By:ER TRIAGE PROTOCOL  Order #:    [id number]                    Reading MD: LORNA WARD MD    Measurements  Intervals                                Axis  Rate:       75                           P:          67  MA:         232                          QRS:        35  QRSD:       78                           T:          -24  QT:         400  QTc:        447    Interpretive Statements  SINUS " RHYTHM  ATRIAL PREMATURE COMPLEX  FIRST DEGREE AV BLOCK  BORDERLINE REPOLARIZATION ABNORMALITY  Compared to ECG 05/06/2021 14:40:37  Atrial premature complex(es) now present  T-wave abnormality no longer present  Electronically Signed On 5-8-2021 2:37:20 PDT by LORNA WARD MD     TROPONIN    Collection Time: 05/07/21 10:14 PM   Result Value Ref Range    Troponin T 29 (H) 6 - 19 ng/L   proBrain Natriuretic Peptide, NT    Collection Time: 05/07/21 10:14 PM   Result Value Ref Range    NT-proBNP 1305 (H) 0 - 125 pg/mL   CBC WITH DIFFERENTIAL    Collection Time: 05/07/21 10:14 PM   Result Value Ref Range    WBC 16.6 (H) 4.8 - 10.8 K/uL    RBC 4.76 4.20 - 5.40 M/uL    Hemoglobin 14.3 12.0 - 16.0 g/dL    Hematocrit 44.1 37.0 - 47.0 %    MCV 92.6 81.4 - 97.8 fL    MCH 30.0 27.0 - 33.0 pg    MCHC 32.4 (L) 33.6 - 35.0 g/dL    RDW 46.3 35.9 - 50.0 fL    Platelet Count 299 164 - 446 K/uL    MPV 11.8 9.0 - 12.9 fL    Neutrophils-Polys 88.60 (H) 44.00 - 72.00 %    Lymphocytes 5.60 (L) 22.00 - 41.00 %    Monocytes 5.10 0.00 - 13.40 %    Eosinophils 0.10 0.00 - 6.90 %    Basophils 0.20 0.00 - 1.80 %    Immature Granulocytes 0.40 0.00 - 0.90 %    Nucleated RBC 0.00 /100 WBC    Neutrophils (Absolute) 14.68 (H) 2.00 - 7.15 K/uL    Lymphs (Absolute) 0.93 (L) 1.00 - 4.80 K/uL    Monos (Absolute) 0.84 0.00 - 0.85 K/uL    Eos (Absolute) 0.02 0.00 - 0.51 K/uL    Baso (Absolute) 0.04 0.00 - 0.12 K/uL    Immature Granulocytes (abs) 0.06 0.00 - 0.11 K/uL    NRBC (Absolute) 0.00 K/uL   COMP METABOLIC PANEL    Collection Time: 05/07/21 10:14 PM   Result Value Ref Range    Sodium 141 135 - 145 mmol/L    Potassium 4.0 3.6 - 5.5 mmol/L    Chloride 102 96 - 112 mmol/L    Co2 28 20 - 33 mmol/L    Anion Gap 11.0 7.0 - 16.0    Glucose 147 (H) 65 - 99 mg/dL    Bun 15 8 - 22 mg/dL    Creatinine 0.87 0.50 - 1.40 mg/dL    Calcium 8.9 8.5 - 10.5 mg/dL    AST(SGOT) 18 12 - 45 U/L    ALT(SGPT) 18 2 - 50 U/L    Alkaline Phosphatase 102 (H) 30 - 99  U/L    Total Bilirubin 0.7 0.1 - 1.5 mg/dL    Albumin 4.2 3.2 - 4.9 g/dL    Total Protein 7.4 6.0 - 8.2 g/dL    Globulin 3.2 1.9 - 3.5 g/dL    A-G Ratio 1.3 g/dL   LACTIC ACID    Collection Time: 05/07/21 10:14 PM   Result Value Ref Range    Lactic Acid 2.8 (H) 0.5 - 2.0 mmol/L   PROTHROMBIN TIME (INR)    Collection Time: 05/07/21 10:14 PM   Result Value Ref Range    PT 23.4 (H) 12.0 - 14.6 sec    INR 2.01 (H) 0.87 - 1.13   APTT    Collection Time: 05/07/21 10:14 PM   Result Value Ref Range    APTT 32.9 24.7 - 36.0 sec   D-DIMER    Collection Time: 05/07/21 10:14 PM   Result Value Ref Range    D-Dimer Screen 0.43 0.00 - 0.50 ug/mL (FEU)   ESTIMATED GFR    Collection Time: 05/07/21 10:14 PM   Result Value Ref Range    GFR If African American >60 >60 mL/min/1.73 m 2    GFR If Non African American >60 >60 mL/min/1.73 m 2   COV-2, FLU A/B, AND RSV BY PCR (2-4 HOURS CEPHEID): Collect NP swab in VTM    Collection Time: 05/07/21 10:40 PM    Specimen: Respirate   Result Value Ref Range    Influenza virus A RNA Negative Negative    Influenza virus B, PCR Negative Negative    RSV, PCR Negative Negative    SARS-CoV-2 by PCR NotDetected     SARS-CoV-2 Source NP Swab    DIGOXIN    Collection Time: 05/08/21 12:30 AM   Result Value Ref Range    Digoxin 1.1 0.8 - 2.0 ng/mL       EKG 5/7/21 - sinus, PAC, first degree AVB, nonspecific T wave changes    All pertinent features of laboratory and imaging reviewed including primary images where applicable      Active Problems:    * No active hospital problems. *  Resolved Problems:    * No resolved hospital problems. *      Assessment / Plan:  80 year old woman with PMH HTN, HLD, DM, hx CVA, paroxysmal AF s/p DCCV 05/06/2021 presents with shortness of breath.    -unclear etiology; HF vs possibly pneumonia. Has had history of recurrent peumonia.  -appreciate ED help with abx  -attempt diuretic although weight doesn't appear to be elevated relative to prior  -check TTE  -CT chest to better  elucidate cxr findings  -further recs pending results    I personally discussed her case with Dr Mckeon     It is my pleasure to participate in the care of Ms. Adams.  Please do not hesitate to contact me with questions or concerns.    Ezra Fitzgerald MD  Cardiologist [facility]

## 2021-05-08 NOTE — ASSESSMENT & PLAN NOTE
Mild acute exacerbation  Solumederol 40mg q8 x9 doses ordered, can DC on medrol pack  Abx as noted in PNA  Nebs, aggressive pulm toilet  Avoid over-oxygenation, target sat 88-94%

## 2021-05-08 NOTE — PROGRESS NOTES
Bedside report received from NOC RN. Assumed care of pt. Pt awake, laying in bed. A/Ox4, VSS. No concerns, complaints or distress. Pt educated to call before getting out of bed. POC reviewed and white board updated. Tele box on. Aflutt 90 on the monitor. Call light in reach. Bed locked in lowest position with 2 upper bed rails up. Bed alarm on.

## 2021-05-08 NOTE — RESPIRATORY CARE
COPD EDUCATION by COPD CLINICAL EDUCATOR  5/8/2021  at  12:27 PM by Karen Artis, RRT     Patient interviewed by COPD education team.  Patient unable to participate in full program.  Short intervention has been conducted.  A comprehensive packet including information about COPD, treatments and safe Oxygen use provided and reviewed together with her and family at bedside. She is from [place], [place] (near [place]).  She has never been told she has COPD /Emphysema/Asthma. However she is on Home O2 at night and as needed during the day. She has had an Albuterol MDI and nebulizer available as needed  for years.  She has never seen a Pulmonary Doctor no had a PFT.   Her Hospital Doctor is treating her for presumed- COPD exacerbation and Cardiac issues. Discussed admission diagnosis with her Hospitalist- Trung Drummond MD. Her respiratory medications were adjusted for heart rate concerns and  he wants to continue treatment for COPD. He asked for post hospital follow up help in establishing with Pulmonary for PFT (see below). Requested new Xopenex prescription for discharge.    COPD Screen  COPD Risk Screening  Do you have a history of COPD?: Yes  Do you have a Pulmonologist?: No  COPD Population Screener  During the past 4 weeks, how much did you feel short of breath?: None/Little of the time  Do you ever cough up any mucus or phlegm?: Yes, a few days a week or month  In the past 12 months, you do less than you used to because of your breathing problems: yes  Have you smoked at least 100 cigarettes in your entire life?: Yes  How old are you?: 60+  COPD Screening Score: 5    COPD Assessment  COPD Clinical Specialists ONLY  COPD Education Initiated: Yes--Short Intervention --Consult-Admit for COPD /CHF exacerbation    Patient does not have a smart phone  Unable to do Remote Monitoring    Physician Name: [facility] patient to schedule  Pulmonary Follow Up Appointment: 05/17/21Appt Time: 1040 Pulmonologist Name:  "Meliza MILLER  Referrals Initiated: Yes  Pulmonary Rehab: Yes (on admit order set)  Smoking Cessation: N/A (Quit 1999 50 py)  Hospice: N/A  Home Health Care: No  [facility] Community Outreach: N/A (out of area)  [facility]: (out of area)  [facility]: N/A (out of area)  Private In-Home Care Agency: N/A  Is this a COPD exacerbation patient?: Yes (at this time and with MD discussion)  $ Demo/Eval of SVN's, MDI's and Aerosols: Yes  (spacer and care/cleaning of equipment)    Meds to Beds  Would the patient like to opt in for Bedside Medication Delivery at Discharge?: Yes, interested     MY COPD ACTION PLAN     It is recommended that patients and physicians /healthcare providers complete this action plan together. This plan should be discussed at each physician visit and updated as needed.    The green, yellow and red zones show groups of symptoms of COPD. This list of symptoms is not comprehensive, and you may experience other symptoms. In the \"Actions\" column, your healthcare provider has recommended actions for you to take based on your symptoms.    Patient Name: Laurie Adams   YOB: 1940   Last Updated on:     Green Zone:  I am doing well today Actions   •  Usual activitiy and exercise level •  Take daily medications   •  Usual amounts of cough and phlegm/mucus •  Use oxygen as prescribed   •  Sleep well at night •  Continue regular exercise/diet plan   •  Appetite is good •  At all times avoid cigarette smoke, inhaled irritants     Daily Medications (these medications are taken every day):             Additional Information:  Talk with your doctor about continuing medications from hospital admission    Yellow Zone:  I am having a bad day or a COPD flare Actions   •  More breathless than usual •  Continue daily medications   •  I have less energy for my daily activities •  Use quick relief inhaler as ordered   •  Increased or thicker phlegm/mucus •  Use oxygen as prescribed   •  " "Using quick relief inhaler/nebulizer more often •  Get plenty of rest   •  Swelling of ankles more than usual •  Use pursed lip breathing   •  More coughing than usual •  At all times avoid cigarette smoke, inhaled irritants   •  I feel like I have a \"chest cold\"   •  Poor sleep and my symptoms woke me up   •  My appetite is not good   •  My medicine is not helping    •  Call provider immediately if symptoms don’t improve     Continue daily medications, add rescue medications:               Medications to be used during a flare up, (as Discussed with Provider):           Additional Information:  Please talk with your Doctor about using recuse inhaler (medication change requested from Albuterol to Xopenex)    Red Zone:  I need urgent medical care Actions   •  Severe shortness of breath even at rest •  Call 911 or seek medical care immediately   •  Not able to do any activity because of breathing    •  Fever or shaking chills    •  Feeling confused or very drowsy     •  Chest pains    •  Coughing up blood              "

## 2021-05-08 NOTE — ASSESSMENT & PLAN NOTE
Right sided infiltrate, b/l pleural effusions 24hours after CVN  F/u sputum Cx, BCx, ngtd, PNA serology, procal   Aspiration precautions, speech eval cleared patient   Abx: Unasyn + azithromycin

## 2021-05-08 NOTE — ASSESSMENT & PLAN NOTE
Dependently on nightly 2-3L oxygen  Had acute worsening overnight requiring up to 8 L of supplemental oxygen which improved with IV Lasix  Home oxygen has been arranged for her.  If her oxygen requirements remain stable she will likely discharge tomorrow sign continue continue IV Lasix twice daily and IV Solu-Medrol

## 2021-05-08 NOTE — FLOWSHEET NOTE
Discussed treatment plan with Dr. Nino as there were conflicting orders. Pt is not being treated for COPD exacerbation and atrovent Q6 is what  Ordered for SOB post-afib. Ok to d/c flutter orders per MD and does not want albuterol ordered due to a-fib.

## 2021-05-08 NOTE — ED NOTES
Med Rec completed: per patient at bedside with home bottles and med list.  Preferred Pharmacy: [facility] in [place]   Allergies:  Allergies   Allergen Reactions   • Feldene [Piroxicam] Photosensitivity   • Sulfa Drugs Hives   • Codeine Anxiety       Home Medications:  Medication Sig Comments   • Biotin 1000 MCG Tab Take 1,000 mcg by mouth every day.    • Multiple Vitamins-Minerals (PRESERVISION AREDS 2) Cap Take 1 capsule by mouth every day.    • Cholecalciferol (VITAMIN D-3) 125 MCG (5000 UT) Tab Take 5,000 Units by mouth every day.    • Cyanocobalamin (VITAMIN B-12) 5000 MCG TABLET DISPERSIBLE Take 5,000 Units by mouth every day.    • amoxicillin (AMOXIL) 500 MG Cap Take 500 mg by mouth 3 times a day. For 10 days Has two days left to complete   • fluticasone (FLONASE) 50 MCG/ACT nasal spray Administer 1 Spray into affected nostril(S) 1 time a day as needed (allergys).    • diphenhydrAMINE HCl (BENADRYL PO) Take 1 tablet by mouth at bedtime as needed (sleep).    • Acetaminophen (TYLENOL PO) Take 1 tablet by mouth 1 time a day as needed (pain).    • loratadine (CLARITIN) 10 MG Tab Take 10 mg by mouth every day.    • losartan (COZAAR) 25 MG Tab Take 25 mg by mouth every day.    • flecainide (TAMBOCOR) 100 MG Tab Take 1 tablet by mouth 2 times a day.    • metoprolol SR (TOPROL XL) 100 MG TABLET SR 24 HR  Take 100 mg by mouth every day.    • digoxin (LANOXIN) 125 MCG Tab Take 1 Tab by mouth every day.    • warfarin (COUMADIN) 5 MG Tab Take 2.5-5 mg by mouth every day.   Take 1 tablet = 5 mg every Mon, Wens, Thurs, Fri, Sat, Sun  Take 1/2 tablet = 2.5 mg every Tues.    • atorvastatin (LIPITOR) 40 MG Tab Take 40 mg by mouth every evening.    • albuterol (VENTOLIN HFA) 108 (90 Base) MCG/ACT Aero Soln inhalation aerosol Ventolin HFA 90 mcg/actuation aerosol inhaler (Patient taking differently: Inhale 2 Puffs every 6 hours as needed for Shortness of Breath.)    • SERTRALINE 100 MG TABS Take 150 mg by mouth every day. 1 1/2  tablet = 150 mg

## 2021-05-08 NOTE — CARE PLAN
Problem: Safety  Goal: Will remain free from falls  Outcome: PROGRESSING AS EXPECTED  Intervention: Implement fall precautions  Flowsheets (Taken 5/8/2021 8179)  Bed Alarm: Yes - Alarm On  Environmental Precautions:  • Treaded Slipper Socks on Patient  • Personal Belongings, Wastebasket, Call Bell etc. in Easy Reach  • Transferred to Stronger Side  • Report Given to Other Health Care Providers Regarding Fall Risk  • Bed in Low Position  • Communication Sign for Patients & Families  • Mobility Assessed & Appropriate Sign Placed     Problem: Fluid Volume:  Goal: Will maintain balanced intake and output  Outcome: PROGRESSING AS EXPECTED

## 2021-05-08 NOTE — PROGRESS NOTES
70-year-old female with history of A. fib status post DC cardioversion on 5/6/2021, DM, HTN, HLD,?  COPD, former smoker with 50 pack years, MIKE on 2 L at nighttime, transferred from [place] with chief complaint of shortness of breath.  Her shortness of breath started the day after patient had cardioversion associated with  productive sputum, severe to the point that she could not complete sentences.  Not relieved with supplemental oxygen at home and rescue inhalers.  She also noticed wheezing but denies any chest pain, palpitation, syncope, leg swelling, orthopnea, PND.  She had CTA PE revealed bilateral pleural effusion,  groundglass opacity suggesting of pulmonary edema, and EKG demonstrated atrial flutter with 3-1 AV block, . Her echo resulted significant for moderate tricuspid regurgitation, RVSP of 46, EF of 65%   Urinalysis significant for leukocytosis WBC 16.6, patient lactate of 2.8, BNP 1305.  Her Covid and influenza virus tested negative    Patient was started on IV Unasyn/azithromycin, IV Lasix, IV Solu-Medrol for suspected acute COPD exacerbation/CHF exacerbation.  Patient now requiring 1.5 L oxygen via nasal cannula, VSS, will continue with IV antibiotics,  follow-up blood cultures, urine cultures, respiratory culture.  Taper supplemental oxygen as tolerated.  Patient is on   flecainide, Lanoxin and Toprol-XL and anticoagulated with warfarin, follow cardiology recommendations

## 2021-05-08 NOTE — ED TRIAGE NOTES
"Chief Complaint   Patient presents with   • Shortness of Breath     Pt is a flight transfer from [facility], was there yesterday and cardioverted out of Afib to a sinus rhythm. Pt went back today for increasing shortness of breath. Sent here for an acute CHF exacerbation. Pt has a wet, congested cough and was given lasix at the prior facility.      /90   Pulse 75   Temp 36.9 °C (98.4 °F) (Temporal)   Resp (!) 26   Ht 1.626 m (5' 4\")   Wt 88.3 kg (194 lb 10.7 oz)   SpO2 93%   BMI 33.41 kg/m²     "

## 2021-05-08 NOTE — PROGRESS NOTES
Cardiology Follow Up Progress Note    Date of Service  5/8/2021    Attending Physician  Trung Drummond M.D.    PMH: Hypertension, hyperlipidemia, type 2 diabetes, CVA,  paroxysmal A.Fib s/p DCCV 5/6/2021 in [facility]    Transferred from [facility]  Presented with increasing shortness of breath concern for acute heart failure exacerbation.      Interim Events    No overnight cardiac events  Remains in A. fib, rate well controlled  We will plan for DCCV in a.m.  Keep n.p.o. after midnight  On oral anticoagulation with warfarin, INR therapeutic  Repeat INR in a.m.      Review of Systems  Review of Systems   Respiratory: Negative for apnea, cough, choking, chest tightness, shortness of breath and stridor.        Vital signs in last 24 hours  Temp:  [36.5 °C (97.7 °F)-37.4 °C (99.4 °F)] 36.5 °C (97.7 °F)  Pulse:  [] 92  Resp:  [16-26] 18  BP: (113-156)/(49-99) 113/49  SpO2:  [88 %-99 %] 91 %    Physical Exam  Physical Exam  Cardiovascular:      Rate and Rhythm: Normal rate. Rhythm irregular.      Pulses: Normal pulses.   Pulmonary:      Effort: Pulmonary effort is normal.   Skin:     General: Skin is warm.   Neurological:      General: No focal deficit present.      Mental Status: She is alert.   Psychiatric:         Mood and Affect: Mood normal.         Lab Review  Lab Results   Component Value Date/Time    WBC 13.0 (H) 05/08/2021 05:19 AM    RBC 4.58 05/08/2021 05:19 AM    HEMOGLOBIN 13.4 05/08/2021 05:19 AM    HEMATOCRIT 43.1 05/08/2021 05:19 AM    MCV 94.1 05/08/2021 05:19 AM    MCH 29.3 05/08/2021 05:19 AM    MCHC 31.1 (L) 05/08/2021 05:19 AM    MPV 11.6 05/08/2021 05:19 AM      Lab Results   Component Value Date/Time    SODIUM 143 05/08/2021 05:19 AM    POTASSIUM 3.5 (L) 05/08/2021 05:19 AM    CHLORIDE 102 05/08/2021 05:19 AM    CO2 32 05/08/2021 05:19 AM    GLUCOSE 131 (H) 05/08/2021 05:19 AM    BUN 13 05/08/2021 05:19 AM    CREATININE 0.91 05/08/2021 05:19 AM    CREATININE 0.8 12/11/2007 09:45 AM       Lab Results   Component Value Date/Time    ASTSGOT 15 2021 05:19 AM    ALTSGPT 15 2021 05:19 AM     Lab Results   Component Value Date/Time    TROPONINT 29 (H) 2021 10:14 PM       Recent Labs     21  2214   NTPROBNP 1305*       Cardiac Imaging and Procedures Review  EK2021, sinus with PAC    Echocardiogram:    2021  No prior study is available for comparison.   Normal left ventricular systolic function.  Left ventricular ejection fraction is visually estimated to be 65%.  Normal right ventricular size and systolic function.  Moderate tricuspid regurgitation. Estimated right ventricular systolic   pressure is 46  mmHg.  Biatrial enlargement.    CTA chest 2021  No evidence of PE  Multiple moderate bilateral pleural effusion  Enlarged left atrium  Interlobular septal thickening and hazy groundglass opacity throughout both lungs, most in the dependent portion of the right upper lobe, this could relate to moderate pulmonary edema.      Cardiac Catheterization: Not applicable    Imaging  Chest X-Ray: No acute cardiopulmonary abnormalities    Stress Test: Not applicable    Assessment/Plan    Afib RVR  HX of PAF   -S/p DCCV 21  -Recently resumed flecainide  -On metoprolol and digoxin  -Remains in A. fib, rate well controlled  -We will plan for DCCV in a.m.  -On warfarin, INR 2    Dyspnea  -COPD versus aspiration pneumonia versus decompensated HF  -Former smoker  -on low dose furesemide  -On IV Unasyn/azithromycin for acute COPD exacerbation    Hypertension  -/50      Cardiology will follow along  Will arrange for DCCV in a.m.  Keep n.p.o. at midnight      Thank you for allowing me to participate in the care of this patient.      Please contact me with any questions.    ESTELA Judge.   Cardiologist, [facility]  [phone]

## 2021-05-08 NOTE — H&P
Hospital Medicine History & Physical Note    Date of Service  5/8/2021    Primary Care Physician  Pcp Pt States None    Consultants  Cardiology    Code Status  DNAR/DNI    Chief Complaint  Chief Complaint   Patient presents with   • Shortness of Breath     Pt is a flight transfer from [facility], was there yesterday and cardioverted out of Afib to a sinus rhythm. Pt went back today for increasing shortness of breath. Sent here for an acute CHF exacerbation. Pt has a wet, congested cough and was given lasix at the prior facility.        History of Presenting Illness  80 y.o. female with h/o afib s/p DCCVN 5/6/2021, DM, HTN, HLD, presumed COPD dependent on 2-3L at nighttime who presented to [facility] 5/7/2021 with SOB and transferred to [facility] 5/8/2021 for higher level of care. Patient reported waking up with shortness of breath a day after cardioversion, became concerned and went to [facility] ER she reported having chronic productive sputum, persistent shortness of breath with exertion and notable wheezing.  She was found to have bilateral pleural effusion, WBC 17 K, BNP in 900s with bilateral pleural effusion on CXR.  She was given Lasix 40 mg IV once then airlifted to [facility] ER for cardiology evaluation.  In renal ER, cardiology was consulted --unclear etiology for dyspnea given recurrent history of pneumonia and COPD exacerbation but noted possible CHF exacerbation as well.    CTA PE showed bilateral pleural effusion as well as right-sided infiltrates.  WBC 16.6K, BMP 1305, LA 2.8.  ED course: albuterol x1, Unasyn + azithromycin x1. Patient was subsequently admitted to medicine service for further evaluation and treatment.    Review of Systems  Review of Systems   Constitutional: Positive for malaise/fatigue. Negative for chills and fever.   HENT: Negative for hearing loss and tinnitus.    Eyes: Negative for blurred vision and double vision.   Respiratory: Positive for cough, sputum production, shortness of  breath and wheezing. Negative for hemoptysis.    Cardiovascular: Positive for orthopnea and leg swelling. Negative for chest pain and palpitations.   Gastrointestinal: Positive for nausea. Negative for abdominal pain, heartburn and vomiting.   Genitourinary: Negative for dysuria and flank pain.   Musculoskeletal: Negative for falls and myalgias.   Skin: Negative for itching and rash.   Neurological: Negative for dizziness and headaches.   Endo/Heme/Allergies: Negative for environmental allergies. Does not bruise/bleed easily.   Psychiatric/Behavioral: Negative for depression and suicidal ideas.   All other systems reviewed and are negative.      Past Medical History   has a past medical history of Arrhythmia (03/2015), Arthritis, Breath shortness, Bronchial asthma (6/29/2012), Bronchitis, Cancer ([facility]) (2005), Cataract, Cerebral atherosclerosis, Cold, Dependence on supplemental oxygen, Dizziness and giddiness, Dyslipidemia, goal LDL below 100 (6/29/2012), High cholesterol, History of tobacco use, HTN (hypertension) (6/29/2012), Obesity (6/29/2012), Pneumonia, Stroke (HCC) (2010), TIA (transient ischemic attack) (6/29/2012), and Urinary incontinence.    Surgical History   has a past surgical history that includes mastoidectomy; hysterectomy, vaginal; pr cholecystoenterostomy+burton-en-y; pr nasal scopy,repr csf leak,sphenoid; other (2008); other (1943); septoplasty (8/19/2009); somnoplasty (8/19/2009); antrostomy (8/19/2009); ethmoidectomy (8/19/2009); cataract phaco with iol (5/21/2013); cataract phaco with iol (6/4/2013); ganglion excision (Bilateral, 1970, 1975); appendectomy (1954); cholecystectomy (1988); colon resection (2007); wrist fusion; and knee arthroplasty total (Right, 6/7/2018).     Family History  family history is not on file.     Social History   reports that she quit smoking about 21 years ago. Her smoking use included cigarettes. She has a 40.00 pack-year smoking history. She has never used smokeless  tobacco. She reports that she does not drink alcohol and does not use drugs.    Allergies  Allergies   Allergen Reactions   • Feldene [Piroxicam] Photosensitivity   • Sulfa Drugs Hives   • Codeine Anxiety       Medications  Prior to Admission Medications   Prescriptions Last Dose Informant Patient Reported? Taking?   BIOTIN PO   Yes No   Sig: Take  by mouth.   Multiple Vitamins-Minerals (VISION FORMULA 2 PO)   Yes No   Sig: Take  by mouth every day.   SERTRALINE 100 MG TABS   Yes No   Sig: Take 100 mg by mouth every day.   VITAMIN D3   Yes No   Sig: Take 5,000 Units by mouth every day.   albuterol (VENTOLIN HFA) 108 (90 Base) MCG/ACT Aero Soln inhalation aerosol   No No   Sig: Ventolin HFA 90 mcg/actuation aerosol inhaler   atorvastatin (LIPITOR) 40 MG Tab   No No   Sig: Take 1 tablet by mouth once daily   digoxin (LANOXIN) 125 MCG Tab   No No   Sig: Take 1 Tab by mouth every day.   flecainide (TAMBOCOR) 100 MG Tab   No No   Sig: Take 1 tablet by mouth 2 times a day.   levalbuterol (XOPENEX HFA) 45 MCG/ACT inhaler   Yes No   Sig: Inhale 1-2 Puffs by mouth. Pt states 1-2 puffs as needed    loratadine (CLARITIN) 10 MG Tab   Yes No   Sig: Take 10 mg by mouth every day.   losartan (COZAAR) 25 MG Tab   Yes No   Sig: Take 25 mg by mouth.   metoprolol SR (TOPROL XL) 100 MG TABLET SR 24 HR   No No   Sig: Take 1 tablet by mouth once daily   warfarin (COUMADIN) 5 MG Tab   No No   Sig: TAKE 1 TO 1 & 1/2 (ONE TO ONE & ONE-HALF) TABLETS BY MOUTH ONCE DAILY      Facility-Administered Medications: None       Physical Exam  Temp:  [36.9 °C (98.4 °F)-37.4 °C (99.4 °F)] 37.4 °C (99.4 °F)  Pulse:  [] 98  Resp:  [16-26] 16  BP: (131-156)/(61-99) 142/91  SpO2:  [88 %-99 %] 88 %    Physical Exam  Constitutional:       Appearance: She is ill-appearing.   HENT:      Head: Normocephalic and atraumatic.      Nose: Nose normal.      Mouth/Throat:      Mouth: Mucous membranes are moist.      Pharynx: Oropharynx is clear.   Eyes:       General: No scleral icterus.     Extraocular Movements: Extraocular movements intact.   Cardiovascular:      Rate and Rhythm: Normal rate and regular rhythm.      Pulses: Normal pulses.      Heart sounds: No friction rub.   Pulmonary:      Effort: Respiratory distress present.      Breath sounds: Wheezing and rales present.      Comments: Prolonged expiratory phase  Appreciable crackles  decreased bibasilar breath sounds  Abdominal:      General: There is no distension.      Palpations: Abdomen is soft.      Tenderness: There is no abdominal tenderness. There is no guarding or rebound.   Musculoskeletal:         General: No tenderness or signs of injury. Normal range of motion.      Cervical back: Normal range of motion and neck supple. No tenderness.      Right lower leg: Edema present.      Left lower leg: Edema present.      Comments: Right TKA scar   Skin:     General: Skin is warm and dry.      Capillary Refill: Capillary refill takes 2 to 3 seconds.   Neurological:      General: No focal deficit present.      Mental Status: She is alert and oriented to person, place, and time.      Motor: No weakness.   Psychiatric:      Comments: Appears anxious         Laboratory:  Recent Labs     05/06/21 1135 05/07/21 2214 05/08/21 0519   WBC 8.3 16.6* 13.0*   RBC 4.97 4.76 4.58   HEMOGLOBIN 14.8 14.3 13.4   HEMATOCRIT 46.6 44.1 43.1   MCV 93.8 92.6 94.1   MCH 29.8 30.0 29.3   MCHC 31.8* 32.4* 31.1*   RDW 46.7 46.3 46.8   PLATELETCT 292 299 265   MPV 11.8 11.8 11.6     Recent Labs     05/06/21 1135 05/07/21 2214   SODIUM 140 141   POTASSIUM 4.8 4.0   CHLORIDE 105 102   CO2 25 28   GLUCOSE 112* 147*   BUN 19 15   CREATININE 0.92 0.87   CALCIUM 9.1 8.9     Recent Labs     05/06/21 1135 05/07/21 2214   ALTSGPT  --  18   ASTSGOT  --  18   ALKPHOSPHAT  --  102*   TBILIRUBIN  --  0.7   GLUCOSE 112* 147*     Recent Labs     05/06/21 1135 05/07/21 2214   APTT  --  32.9   INR 2.03* 2.01*     Recent Labs     05/07/21 2214    NTPROBNP 1305*         Recent Labs     05/07/21  2214   TROPONINT 29*       Imaging:  CT-CTA CHEST PULMONARY ARTERY W/ RECONS   Final Result         1. No CT evidence of pulmonary embolism.      .2. Interlobular septal thickening and hazy groundglass opacity throughout both lungs, most in the dependent portion of the right upper lobe. This could relate to moderate pulmonary edema.      3. Multiple moderate bilateral pleural effusions      4. Enlarged left atrium.      DX-CHEST-PORTABLE (1 VIEW)   Final Result         1. No acute cardiopulmonary abnormalities are identified.      OUTSIDE IMAGES-DX CHEST   Final Result      EC-ECHOCARDIOGRAM COMPLETE W/O CONT    (Results Pending)         Assessment/Plan:  I anticipate this patient will require at least two midnights for appropriate medical management, necessitating inpatient admission.    * Dyspnea- (present on admission)  Assessment & Plan  Multifactorial -- aspiration PNA, combined HF exacerbation and mild COPD exacerbation  Abx  Diuresis  Nebs treatment, steroid  F/u echo    Acute exacerbation of CHF (congestive heart failure) (HCC)  Assessment & Plan  B/l pleural effusions noted on CTA PE  BNP 1305    S/p Lasix 40mg IV x1 at [facility] prior to airlift to [facility]  Cont Lasix 20mg IV daily  Strict I&O, daily weights, Na/Fluid restriction    Cont BB/ARB  Repeat echo  Cardiology f/u appreciated    Aspiration pneumonia (HCC)  Assessment & Plan  SOB, productive sputum  Right sided infiltrate, b/l pleural effusions 24hours after CVN  WBC 16.6k, LA 2.8    F/u sputum Cx, BCx, PNA serology, procal  Aspiration precautions, speech eval  Abx: Unasyn + azithromycin    Atrial fibrillation (CMS-HCC) [I48.91]- (present on admission)  Assessment & Plan  S/p DCCVN 5/6/2021  Currently in sinus [facility]    OAC: warfarin -- dosing and INR monitoring as per pharmacy  Cont Toprolol XL, Digoxin, flecainide  Repeat echo as per cardiology    Acute pulmonary edema (HCC)  Assessment & Plan  Acute  b/l moderate pulmonary edema  Diuresis as tolerated  F/u echo  Cardiology following    Acute on chronic respiratory failure with hypoxia (HCC)  Assessment & Plan  Dependently on nightly 2-3L oxygen  Currently at baseline with 2-3L -- cont monitoring    Chronic obstructive pulmonary disease with acute exacerbation (HCC)  Assessment & Plan  Mild acute exacerbation  Solumederol 40mg q8 x9 doses ordered  Abx as noted in PNA  Nebs, aggressive pulm toilet  Avoid over-oxygenation, target sat 88-94%    Hyperglycemia  Assessment & Plan  ISS as she is on Solumedrol  Repeat HbA1c    Mood disorder (HCC)  Assessment & Plan  zoloft    Age-related physical debility  Assessment & Plan  PT/OT  Fall precautions    Sepsis ([facility])  Assessment & Plan  This is Sepsis Present on admission  SIRS criteria identified on my evaluation include: Tachycardia, with heart rate greater than 90 BPM, Leukocytosis, with WBC greater than 12,000 and Bandemia, greater than 10% bands  Source is pulm  Suspected onset of infection (date and time) 5/8/2021  Sepsis protocol initiated  Fluid resuscitation ordered per protocol  IV antibiotics as appropriate for source of sepsis  While organ dysfunction may be noted elsewhere in this problem list or in the chart, degree of organ dysfunction does not meet CMS criteria for severe sepsis    Cautious with IVF given CHF        Leukocytosis  Assessment & Plan  Likely secondary to PNA/sepsis  Likely to increase more as pt is on Solumederol    Advance care planning  Assessment & Plan  Discussed plan of care for this admission with patient  DNR/DNI status confirmed - no CPR, defibrillation, intuabtion  AAOx4 at time of evaluation  Advance care planning: 15mins    Former tobacco use  Assessment & Plan  40-pack year smoking previously    Dyslipidemia, goal LDL below 100- (present on admission)  Assessment & Plan  statin    HTN (hypertension)- (present on admission)  Assessment & Plan  Losartan, metoprolol, digoxin    VTE  ppx: warfarin -- dosing and INR monitoring as per pharmacy  Diet: pureed to cardiac as tolerated  Code: DNR/DNI  Dispo: admit

## 2021-05-08 NOTE — ED PROVIDER NOTES
ED Provider Note    Scribed for Tawanda Mckeon M.D. by Patricia Galvez. 5/7/2021, 10:24 PM.    Primary care provider: Pcp Pt States None  Means of arrival: Med Flight  History obtained from: Patient  History limited by: None     CHIEF COMPLAINT  Chief Complaint   Patient presents with   • Shortness of Breath     Pt is a flight transfer from [facility], was there yesterday and cardioverted out of Afib to a sinus rhythm. Pt went back today for increasing shortness of breath. Sent here for an acute CHF exacerbation. Pt has a wet, congested cough and was given lasix at the prior facility.        HPI  Laurie Adams is a 80 y.o. female with a history of atrial fibrillation who presents to the Emergency Department for evaluation of shortness of breath onset this morning. She is a flight transfer from [facility]. She was seen yesterday at [facility] for an outpatient cardioversion for her atrial fibrillation. She woke up this morning and noticed her shortness of breath and she used her inhaler which mildly alleviate her symptoms. Later in the day, she went to go lay down and when she got up, her shortness of breath got worse and her inhaler did not help. Her shortness of breath has worsened progressively throughout the day, which is what prompted her to come to go to the hospital for evaluation. She states that the shortness of breath began gradually. She is on oxygen only at night. She experiences associated cough with pink sputum. She denies associated chest pain, nausea, vomiting, or fever. She currently takes Coumadin and has not missed any doses.     REVIEW OF SYSTEMS  Pertinent positives include shortness of breath and cough with pink sputum. Pertinent negatives include chest pain, nausea, vomiting, or fever. All other systems negative.    PAST MEDICAL HISTORY   has a past medical history of Arrhythmia (03/2015), Arthritis, Breath shortness, Bronchial asthma (6/29/2012), Bronchitis, Cancer (HCC) (2005),  Cataract, Cerebral atherosclerosis, Cold, Dependence on supplemental oxygen, Dizziness and giddiness, Dyslipidemia, goal LDL below 100 (2012), High cholesterol, History of tobacco use, HTN (hypertension) (2012), Obesity (2012), Pneumonia, Stroke (HCC) (), TIA (transient ischemic attack) (2012), and Urinary incontinence.    SURGICAL HISTORY   has a past surgical history that includes mastoidectomy; hysterectomy, vaginal; cholecystoenterostomy+burton-en-y; nasal scopy,repr csf leak,sphenoid; other (); other (1943); septoplasty (2009); somnoplasty (2009); antrostomy (2009); ethmoidectomy (2009); cataract phaco with iol (2013); cataract phaco with iol (2013); ganglion excision (Bilateral, 1970, ); appendectomy (); cholecystectomy (); colon resection (); wrist fusion; and knee arthroplasty total (Right, 2018).    SOCIAL HISTORY  Social History     Tobacco Use   • Smoking status: Former Smoker     Packs/day: 1.00     Years: 40.00     Pack years: 40.00     Types: Cigarettes     Quit date: 1999     Years since quittin.7   • Smokeless tobacco: Never Used   Substance Use Topics   • Alcohol use: No   • Drug use: No      Social History     Substance and Sexual Activity   Drug Use No       FAMILY HISTORY  No family history noted.    CURRENT MEDICATIONS  Home Medications     Reviewed by Dayami Huang R.N. (Registered Nurse) on 21 at 2218  Med List Status: Not Addressed   Medication Last Dose Status   albuterol (VENTOLIN HFA) 108 (90 Base) MCG/ACT Aero Soln inhalation aerosol  Active   atorvastatin (LIPITOR) 40 MG Tab  Active   BIOTIN PO  Active   digoxin (LANOXIN) 125 MCG Tab  Active   flecainide (TAMBOCOR) 100 MG Tab  Active   levalbuterol (XOPENEX HFA) 45 MCG/ACT inhaler  Active   loratadine (CLARITIN) 10 MG Tab  Active   losartan (COZAAR) 25 MG Tab  Active   metoprolol SR (TOPROL XL) 100 MG TABLET SR 24 HR  Active   Multiple  "Vitamins-Minerals (VISION FORMULA 2 PO)  Active   SERTRALINE 100 MG TABS  Active   VITAMIN D3  Active   warfarin (COUMADIN) 5 MG Tab  Active                ALLERGIES  Allergies   Allergen Reactions   • Feldene [Piroxicam] Photosensitivity   • Sulfa Drugs Hives   • Codeine Anxiety       PHYSICAL EXAM  VITAL SIGNS: /90   Pulse 75   Temp 36.9 °C (98.4 °F) (Temporal)   Resp (!) 26   Ht 1.626 m (5' 4\")   Wt 88.3 kg (194 lb 10.7 oz)   SpO2 93%   BMI 33.41 kg/m²     Constitutional: Well developed, Well nourished, mild distress.   HENT: Normocephalic, Atraumatic, mask in place.  Eyes: Conjunctiva normal, No discharge.   Cardiovascular: Normal heart rate, Normal rhythm, No murmurs, equal pulses.   Pulmonary: Bilateral wheezing with rales in the right base, No rhonchi.  Chest: No chest wall tenderness or deformity.   Abdomen:Soft, No tenderness.  Musculoskeletal: No major deformities noted, No tenderness. No edema in legs. No calf tenderness  Skin: Warm, Dry, No erythema, No rash.   Neurologic: Alert & oriented x 3, Normal motor function,  No focal deficits noted.   Psychiatric: Affect normal, Judgment normal, Mood normal.     LABS  Results for orders placed or performed during the hospital encounter of 05/07/21   TROPONIN   Result Value Ref Range    Troponin T 29 (H) 6 - 19 ng/L   proBrain Natriuretic Peptide, NT   Result Value Ref Range    NT-proBNP 1305 (H) 0 - 125 pg/mL   CBC WITH DIFFERENTIAL   Result Value Ref Range    WBC 16.6 (H) 4.8 - 10.8 K/uL    RBC 4.76 4.20 - 5.40 M/uL    Hemoglobin 14.3 12.0 - 16.0 g/dL    Hematocrit 44.1 37.0 - 47.0 %    MCV 92.6 81.4 - 97.8 fL    MCH 30.0 27.0 - 33.0 pg    MCHC 32.4 (L) 33.6 - 35.0 g/dL    RDW 46.3 35.9 - 50.0 fL    Platelet Count 299 164 - 446 K/uL    MPV 11.8 9.0 - 12.9 fL    Neutrophils-Polys 88.60 (H) 44.00 - 72.00 %    Lymphocytes 5.60 (L) 22.00 - 41.00 %    Monocytes 5.10 0.00 - 13.40 %    Eosinophils 0.10 0.00 - 6.90 %    Basophils 0.20 0.00 - 1.80 %    " Immature Granulocytes 0.40 0.00 - 0.90 %    Nucleated RBC 0.00 /100 WBC    Neutrophils (Absolute) 14.68 (H) 2.00 - 7.15 K/uL    Lymphs (Absolute) 0.93 (L) 1.00 - 4.80 K/uL    Monos (Absolute) 0.84 0.00 - 0.85 K/uL    Eos (Absolute) 0.02 0.00 - 0.51 K/uL    Baso (Absolute) 0.04 0.00 - 0.12 K/uL    Immature Granulocytes (abs) 0.06 0.00 - 0.11 K/uL    NRBC (Absolute) 0.00 K/uL   COMP METABOLIC PANEL   Result Value Ref Range    Sodium 141 135 - 145 mmol/L    Potassium 4.0 3.6 - 5.5 mmol/L    Chloride 102 96 - 112 mmol/L    Co2 28 20 - 33 mmol/L    Anion Gap 11.0 7.0 - 16.0    Glucose 147 (H) 65 - 99 mg/dL    Bun 15 8 - 22 mg/dL    Creatinine 0.87 0.50 - 1.40 mg/dL    Calcium 8.9 8.5 - 10.5 mg/dL    AST(SGOT) 18 12 - 45 U/L    ALT(SGPT) 18 2 - 50 U/L    Alkaline Phosphatase 102 (H) 30 - 99 U/L    Total Bilirubin 0.7 0.1 - 1.5 mg/dL    Albumin 4.2 3.2 - 4.9 g/dL    Total Protein 7.4 6.0 - 8.2 g/dL    Globulin 3.2 1.9 - 3.5 g/dL    A-G Ratio 1.3 g/dL   LACTIC ACID   Result Value Ref Range    Lactic Acid 2.8 (H) 0.5 - 2.0 mmol/L   COV-2, FLU A/B, AND RSV BY PCR (2-4 HOURS CEPHEID): Collect NP swab in VTM    Specimen: Respirate   Result Value Ref Range    Influenza virus A RNA Negative Negative    Influenza virus B, PCR Negative Negative    RSV, PCR Negative Negative    SARS-CoV-2 by PCR NotDetected     SARS-CoV-2 Source NP Swab    PROTHROMBIN TIME (INR)   Result Value Ref Range    PT 23.4 (H) 12.0 - 14.6 sec    INR 2.01 (H) 0.87 - 1.13   APTT   Result Value Ref Range    APTT 32.9 24.7 - 36.0 sec   D-DIMER   Result Value Ref Range    D-Dimer Screen 0.43 0.00 - 0.50 ug/mL (FEU)   ESTIMATED GFR   Result Value Ref Range    GFR If African American >60 >60 mL/min/1.73 m 2    GFR If Non African American >60 >60 mL/min/1.73 m 2   DIGOXIN   Result Value Ref Range    Digoxin 1.1 0.8 - 2.0 ng/mL   EKG   Result Value Ref Range    Report       [facility] Emergency Dept.    Test Date:  2021-05-07  Pt Name:    ANNA  Britt                Department: ER  MRN:        [id number]                      Room:        06  Gender:     Female                       Technician: [id number]  :        [id number]                   Requested By:ER TRIAGE PROTOCOL  Order #:    [id number]                    Reading MD: LORNA WARD MD    Measurements  Intervals                                Axis  Rate:       75                           P:          67  MI:         232                          QRS:        35  QRSD:       78                           T:          -24  QT:         400  QTc:        447    Interpretive Statements  SINUS RHYTHM  ATRIAL PREMATURE COMPLEX  FIRST DEGREE AV BLOCK  BORDERLINE REPOLARIZATION ABNORMALITY  Compared to ECG 2021 14:40:37  Atrial premature complex(es) now present  T-wave abnormality no longer present  Electronically Signed On 2021 2:37:20 PDT by LORNA WARD MD        All labs reviewed by me.    EKG  12 Lead EKG interpreted by me as shown above.     COURSE & MEDICAL DECISION MAKING  Pertinent Labs & Imaging studies reviewed. (See chart for details)    10:24 PM - Patient seen and examined at bedside. Patient will be treated with albuterol 2.5 mg. Ordered EKG, COVID Testing, Lactic Acid, CMP, CBC w/ Diff, proBNP, Troponin, APTT, Prothrombin, and D-Dimer to evaluate her symptoms. The differential diagnoses include but are not limited to: CHF exacerbation, pericardial effusion, pneumonia, COVID, or COPD exacerbation.      11:24 PM - Patient was reevaluated at bedside.     11:26 PM Paged Cardiology.      11:33 PM - I discussed the patient's case and the above findings with Dr. Fitzgerald (Cardiology) who states the patient should receive a CT scan and hold off on the Lasix. Ordered ECG, CT-CTA, Digoxin, and Procalcitonin to evaluate her symptoms. She will be treated with Zithromax 500 mg and Unasyn 3 mg.     11:45 PM - Patient was reevaluated at bedside.     2:22 AM Paged Hospitalist.  Discussed  case with Dr. Snyder is agreed to consult on hospitalization    Medical Decision Making: This point time appears the patient most likely had a COPD exacerbation versus congestive heart failure.  Patient had bilateral wheezing is improved with breathing treatments.  She does have an elevated proBNP and slightly elevated troponin.  CT was done and it does appear the patient may have a focal pulmonary edema versus pneumonia.  Patient was given empiric antibiotics given the fact she has an elevated white blood cell count.  Patient will be admitted to the hospital for further work-up and trending of her troponins.  [facility] will [facility] off for now.    DISPOSITION:  Patient will be hospitalized by Dr. Snyder in guarded condition.    FINAL IMPRESSION  1. Hypoxia    2. Acute exacerbation of chronic obstructive pulmonary disease (COPD) (HCC)    3. Congestive heart failure, unspecified HF chronicity, unspecified heart failure type (HCC)    4. Pneumonia of right lower lobe due to infectious organism          Patricia HART (Mitch), am scribing for, and in the presence of, Tawanda Mckeon M.D.    Electronically signed by: Patricia Galvez (Mitch), 5/7/2021    Tawanda HART M.D. personally performed the services described in this documentation, as scribed by Patricia Galvez in my presence, and it is both accurate and complete. C.     The note accurately reflects work and decisions made by me.  Tawanda Mckeon M.D.  5/8/2021  2:40 AM

## 2021-05-08 NOTE — PROGRESS NOTES
Inpatient Anticoagulation Service Note    Date: 5/8/2021    Reason for Anticoagulation: Atrial Fibrillation   Target INR: 2.0 to 3.0  [id number] VASc Score: 5  HAS-BLED Score: 2   Hemoglobin Value: 14.3  Hematocrit Value: 44.1  Lab Platelet Value: 299    INR from last 7 days     Date/Time INR Value    05/07/21 2214  (!) 2.01        Dose from last 7 days     Date/Time Dose (mg)    05/08/21 0407  5        Average Dose (mg): 5(5mg all days except 2.5mg on Tuesday)  Significant Interactions: Statin  Bridge Therapy: No (If less than 5 days and overlap therapy discontinued -- document reason (i.e. Bleed Risk))    (If still on overlap therapy, if No -- document reason (i.e. Bleed Risk))    Reversal Agent Administered: Not Applicable  Comments: On warfarin for a fib. Stable INR on this dose.    Plan:  Continue home regimen of 5mg all days except 2.5mg on Tuesday. F/U INR scheduled for 5/10.   Education Material Provided?: No  Pharmacist suggested discharge dosing: Likely will discharge on home regimen pending follow up INR.     Deepti Kelly, MarinD

## 2021-05-08 NOTE — THERAPY
"Speech Language Pathology   Clinical Swallow Evaluation     Patient Name: Laurie Adams  AGE:  80 y.o., SEX:  female  Medical Record #: [id number]  Today's Date: 5/8/2021     Precautions  Comments: (P) pt with recent cardioversion per EMR    Assessment    Patient is 80 y.o. female with a diagnosis of SOB. Per EMR8/2021 for higher level of care. Patient reported waking up with shortness of breath a day after cardioversion, became concerned and went to [facility] she reported having chronic productive sputum, persistent shortness of breath with exertion and notable wheezing.  She was found to have bilateral pleural effusion, WBC 17 K, BNP in 900s with bilateral pleural effusion on CXR.  She was given Lasix 40 mg IV once then airlifted to [facility] ER for cardiology evaluation.  In renal ER, cardiology was consulted.     Acute chest xray indicating \"no acute cardio-pulm process.\"Pt placed on puree and slightly thick liquids at admit.  Nurs noting pt has been tolerating thin water. Pt denies any swallow difficulty premorbidly. Pt assessed with one cup of thin OJ, sips of thin water using a straw, 1/2 cup diced fruit, and Saltine crackers. No delay in swallow, no s/s of difficulty, no oral residue and pt denying any sensation of swallow difficulty. Pt at the level for regular and thins as tolerated. No further SLP indicated at this time.   Additional factors influencing patient status/progress: pt demonstrating no s/s of difficulty.     Plan  Regular texture and thins-no further SLP dysphagia tx indicated.   Recommend Speech Therapy for Evaluation only for the following treatments:      Discharge Recommendations: (P) Other(eval only, no active tx. )       05/08/21 1417   Prior Level Of Function   Communication Within Functional Limits   Swallow Within Functional Limits   Dentition   (adequate)   Hearing Within Functional Limits for Evaluation   Patient's Primary Language English   Oral Motor Eval    Is Patient Able to " Complete Oral Motor Eval Yes, Within Normal Limits   Laryngeal Function   Voice Quality Within Functional Limits   Volutional Cough Within Functional Limits   Excursion Upon Swallow Complete   Max Phonation Time (Seconds) greater than 5 seconds   Oral Food Presentation   Single Swallow Thin (0) Within Functional Limits   Serial Swallow Thin (0) Within Functional Limits   Soft & Bite-Sized (6) - (Dysphagia III) Within Functional Limits   Regular (7) Within Functional Limits   Self Feeding Independent   Dysphagia Strategies / Recommendations   Strategies / Interventions Recommended (Yes / No)   (eval only. Please inform speech if pt experiences difficulty)   Dysphagia Rating   Nutritional Liquid Intake Rating Scale Non thickened beverages   Nutritional Food Intake Rating Scale Total oral diet with no restrictions   Patient / Family Goals   Patient / Family Goal #1 I want regular food   Goal #1 Outcome   (regular texture ordered following eval )   Session Information   Priority 0  (swallow eval only, no active tx. )

## 2021-05-09 LAB
ALBUMIN SERPL BCP-MCNC: 3.5 G/DL (ref 3.2–4.9)
ALBUMIN/GLOB SERPL: 1.2 G/DL
ALP SERPL-CCNC: 83 U/L (ref 30–99)
ALT SERPL-CCNC: 14 U/L (ref 2–50)
ANION GAP SERPL CALC-SCNC: 14 MMOL/L (ref 7–16)
ANISOCYTOSIS BLD QL SMEAR: ABNORMAL
AST SERPL-CCNC: 24 U/L (ref 12–45)
BASOPHILS # BLD AUTO: 0.9 % (ref 0–1.8)
BASOPHILS # BLD: 0.13 K/UL (ref 0–0.12)
BILIRUB SERPL-MCNC: 0.5 MG/DL (ref 0.1–1.5)
BUN SERPL-MCNC: 24 MG/DL (ref 8–22)
CALCIUM SERPL-MCNC: 8.4 MG/DL (ref 8.5–10.5)
CHLORIDE SERPL-SCNC: 98 MMOL/L (ref 96–112)
CO2 SERPL-SCNC: 23 MMOL/L (ref 20–33)
CREAT SERPL-MCNC: 0.98 MG/DL (ref 0.5–1.4)
EOSINOPHIL # BLD AUTO: 0 K/UL (ref 0–0.51)
EOSINOPHIL NFR BLD: 0 % (ref 0–6.9)
ERYTHROCYTE [DISTWIDTH] IN BLOOD BY AUTOMATED COUNT: 44.1 FL (ref 35.9–50)
GLOBULIN SER CALC-MCNC: 3 G/DL (ref 1.9–3.5)
GLUCOSE BLD-MCNC: 134 MG/DL (ref 65–99)
GLUCOSE BLD-MCNC: 146 MG/DL (ref 65–99)
GLUCOSE BLD-MCNC: 166 MG/DL (ref 65–99)
GLUCOSE BLD-MCNC: 171 MG/DL (ref 65–99)
GLUCOSE SERPL-MCNC: 184 MG/DL (ref 65–99)
HCT VFR BLD AUTO: 41.1 % (ref 37–47)
HGB BLD-MCNC: 14 G/DL (ref 12–16)
INR PPP: 2.15 (ref 0.87–1.13)
LYMPHOCYTES # BLD AUTO: 0.47 K/UL (ref 1–4.8)
LYMPHOCYTES NFR BLD: 3.4 % (ref 22–41)
MACROCYTES BLD QL SMEAR: ABNORMAL
MAGNESIUM SERPL-MCNC: 2 MG/DL (ref 1.5–2.5)
MANUAL DIFF BLD: NORMAL
MCH RBC QN AUTO: 30.4 PG (ref 27–33)
MCHC RBC AUTO-ENTMCNC: 34.1 G/DL (ref 33.6–35)
MCV RBC AUTO: 89.2 FL (ref 81.4–97.8)
MICROCYTES BLD QL SMEAR: ABNORMAL
MONOCYTES # BLD AUTO: 0.11 K/UL (ref 0–0.85)
MONOCYTES NFR BLD AUTO: 0.8 % (ref 0–13.4)
MORPHOLOGY BLD-IMP: NORMAL
NEUTROPHILS # BLD AUTO: 13.19 K/UL (ref 2–7.15)
NEUTROPHILS NFR BLD: 94.9 % (ref 44–72)
NRBC # BLD AUTO: 0 K/UL
NRBC BLD-RTO: 0 /100 WBC
PHOSPHATE SERPL-MCNC: 3.5 MG/DL (ref 2.5–4.5)
PLATELET # BLD AUTO: 233 K/UL (ref 164–446)
PLATELET BLD QL SMEAR: NORMAL
PMV BLD AUTO: 11.8 FL (ref 9–12.9)
POLYCHROMASIA BLD QL SMEAR: NORMAL
POTASSIUM SERPL-SCNC: 4 MMOL/L (ref 3.6–5.5)
PROT SERPL-MCNC: 6.5 G/DL (ref 6–8.2)
PROTHROMBIN TIME: 24.6 SEC (ref 12–14.6)
RBC # BLD AUTO: 4.61 M/UL (ref 4.2–5.4)
RBC BLD AUTO: PRESENT
SODIUM SERPL-SCNC: 135 MMOL/L (ref 135–145)
TSH SERPL DL<=0.005 MIU/L-ACNC: 0.55 UIU/ML (ref 0.38–5.33)
WBC # BLD AUTO: 13.9 K/UL (ref 4.8–10.8)

## 2021-05-09 PROCEDURE — 700105 HCHG RX REV CODE 258: Performed by: STUDENT IN AN ORGANIZED HEALTH CARE EDUCATION/TRAINING PROGRAM

## 2021-05-09 PROCEDURE — 83735 ASSAY OF MAGNESIUM: CPT

## 2021-05-09 PROCEDURE — 85007 BL SMEAR W/DIFF WBC COUNT: CPT

## 2021-05-09 PROCEDURE — 700101 HCHG RX REV CODE 250: Performed by: STUDENT IN AN ORGANIZED HEALTH CARE EDUCATION/TRAINING PROGRAM

## 2021-05-09 PROCEDURE — 84443 ASSAY THYROID STIM HORMONE: CPT

## 2021-05-09 PROCEDURE — 87040 BLOOD CULTURE FOR BACTERIA: CPT | Mod: 91

## 2021-05-09 PROCEDURE — 700111 HCHG RX REV CODE 636 W/ 250 OVERRIDE (IP): Performed by: STUDENT IN AN ORGANIZED HEALTH CARE EDUCATION/TRAINING PROGRAM

## 2021-05-09 PROCEDURE — 82962 GLUCOSE BLOOD TEST: CPT | Mod: 91

## 2021-05-09 PROCEDURE — 700111 HCHG RX REV CODE 636 W/ 250 OVERRIDE (IP): Performed by: INTERNAL MEDICINE

## 2021-05-09 PROCEDURE — 80053 COMPREHEN METABOLIC PANEL: CPT

## 2021-05-09 PROCEDURE — 94669 MECHANICAL CHEST WALL OSCILL: CPT

## 2021-05-09 PROCEDURE — 36415 COLL VENOUS BLD VENIPUNCTURE: CPT

## 2021-05-09 PROCEDURE — 99233 SBSQ HOSP IP/OBS HIGH 50: CPT | Performed by: INTERNAL MEDICINE

## 2021-05-09 PROCEDURE — 94640 AIRWAY INHALATION TREATMENT: CPT

## 2021-05-09 PROCEDURE — 770020 HCHG ROOM/CARE - TELE (206)

## 2021-05-09 PROCEDURE — 94760 N-INVAS EAR/PLS OXIMETRY 1: CPT

## 2021-05-09 PROCEDURE — 87205 SMEAR GRAM STAIN: CPT | Mod: 91

## 2021-05-09 PROCEDURE — 700102 HCHG RX REV CODE 250 W/ 637 OVERRIDE(OP): Performed by: STUDENT IN AN ORGANIZED HEALTH CARE EDUCATION/TRAINING PROGRAM

## 2021-05-09 PROCEDURE — 84100 ASSAY OF PHOSPHORUS: CPT

## 2021-05-09 PROCEDURE — 85610 PROTHROMBIN TIME: CPT

## 2021-05-09 PROCEDURE — 85027 COMPLETE CBC AUTOMATED: CPT

## 2021-05-09 PROCEDURE — A9270 NON-COVERED ITEM OR SERVICE: HCPCS | Performed by: STUDENT IN AN ORGANIZED HEALTH CARE EDUCATION/TRAINING PROGRAM

## 2021-05-09 PROCEDURE — 99233 SBSQ HOSP IP/OBS HIGH 50: CPT | Performed by: STUDENT IN AN ORGANIZED HEALTH CARE EDUCATION/TRAINING PROGRAM

## 2021-05-09 RX ORDER — LEVALBUTEROL INHALATION SOLUTION 1.25 MG/3ML
1.25 SOLUTION RESPIRATORY (INHALATION)
Status: DISCONTINUED | OUTPATIENT
Start: 2021-05-09 | End: 2021-05-10

## 2021-05-09 RX ORDER — METHYLPREDNISOLONE SODIUM SUCCINATE 40 MG/ML
40 INJECTION, POWDER, LYOPHILIZED, FOR SOLUTION INTRAMUSCULAR; INTRAVENOUS EVERY 12 HOURS
Status: COMPLETED | OUTPATIENT
Start: 2021-05-10 | End: 2021-05-10

## 2021-05-09 RX ADMIN — FUROSEMIDE 20 MG: 10 INJECTION, SOLUTION INTRAMUSCULAR; INTRAVENOUS at 05:53

## 2021-05-09 RX ADMIN — LEVALBUTEROL 1.25 MG: 1.25 SOLUTION RESPIRATORY (INHALATION) at 12:20

## 2021-05-09 RX ADMIN — METHYLPREDNISOLONE SODIUM SUCCINATE 40 MG: 40 INJECTION, POWDER, FOR SOLUTION INTRAMUSCULAR; INTRAVENOUS at 14:48

## 2021-05-09 RX ADMIN — LOSARTAN POTASSIUM 25 MG: 25 TABLET, FILM COATED ORAL at 05:52

## 2021-05-09 RX ADMIN — FLECAINIDE ACETATE 100 MG: 100 TABLET ORAL at 05:52

## 2021-05-09 RX ADMIN — INSULIN HUMAN 1 UNITS: 100 INJECTION, SOLUTION PARENTERAL at 11:52

## 2021-05-09 RX ADMIN — INSULIN HUMAN 1 UNITS: 100 INJECTION, SOLUTION PARENTERAL at 06:19

## 2021-05-09 RX ADMIN — FLECAINIDE ACETATE 100 MG: 100 TABLET ORAL at 17:24

## 2021-05-09 RX ADMIN — AMPICILLIN SODIUM AND SULBACTAM SODIUM 3 G: 2; 1 INJECTION, POWDER, FOR SOLUTION INTRAMUSCULAR; INTRAVENOUS at 11:49

## 2021-05-09 RX ADMIN — AMPICILLIN SODIUM AND SULBACTAM SODIUM 3 G: 2; 1 INJECTION, POWDER, FOR SOLUTION INTRAMUSCULAR; INTRAVENOUS at 05:53

## 2021-05-09 RX ADMIN — DIGOXIN 125 MCG: 125 TABLET ORAL at 06:13

## 2021-05-09 RX ADMIN — AZITHROMYCIN MONOHYDRATE 500 MG: 250 TABLET ORAL at 06:12

## 2021-05-09 RX ADMIN — LEVALBUTEROL 1.25 MG: 1.25 SOLUTION RESPIRATORY (INHALATION) at 03:15

## 2021-05-09 RX ADMIN — INSULIN HUMAN 2 UNITS: 100 INJECTION, SOLUTION PARENTERAL at 20:29

## 2021-05-09 RX ADMIN — AMPICILLIN SODIUM AND SULBACTAM SODIUM 3 G: 2; 1 INJECTION, POWDER, FOR SOLUTION INTRAMUSCULAR; INTRAVENOUS at 17:24

## 2021-05-09 RX ADMIN — AMPICILLIN SODIUM AND SULBACTAM SODIUM 3 G: 2; 1 INJECTION, POWDER, FOR SOLUTION INTRAMUSCULAR; INTRAVENOUS at 01:04

## 2021-05-09 RX ADMIN — AMPICILLIN SODIUM AND SULBACTAM SODIUM 3 G: 2; 1 INJECTION, POWDER, FOR SOLUTION INTRAMUSCULAR; INTRAVENOUS at 23:28

## 2021-05-09 RX ADMIN — LORATADINE 10 MG: 10 TABLET ORAL at 05:52

## 2021-05-09 RX ADMIN — METOPROLOL SUCCINATE 100 MG: 50 TABLET, EXTENDED RELEASE ORAL at 05:52

## 2021-05-09 RX ADMIN — LEVALBUTEROL 1.25 MG: 1.25 SOLUTION RESPIRATORY (INHALATION) at 07:52

## 2021-05-09 RX ADMIN — METHYLPREDNISOLONE SODIUM SUCCINATE 40 MG: 40 INJECTION, POWDER, FOR SOLUTION INTRAMUSCULAR; INTRAVENOUS at 05:53

## 2021-05-09 RX ADMIN — ATORVASTATIN CALCIUM 40 MG: 40 TABLET, FILM COATED ORAL at 17:23

## 2021-05-09 RX ADMIN — LEVALBUTEROL 1.25 MG: 1.25 SOLUTION RESPIRATORY (INHALATION) at 21:09

## 2021-05-09 RX ADMIN — WARFARIN SODIUM 5 MG: 5 TABLET ORAL at 17:24

## 2021-05-09 RX ADMIN — SERTRALINE HYDROCHLORIDE 100 MG: 100 TABLET ORAL at 05:52

## 2021-05-09 ASSESSMENT — ENCOUNTER SYMPTOMS
TREMORS: 0
BLURRED VISION: 0
COUGH: 0
CHILLS: 0
COUGH: 1
SHORTNESS OF BREATH: 1
SPUTUM PRODUCTION: 1
HEMOPTYSIS: 0
APNEA: 0
VOMITING: 0
WHEEZING: 1
FOCAL WEAKNESS: 0
WEAKNESS: 0
SINUS PAIN: 0
CHEST TIGHTNESS: 0
SHORTNESS OF BREATH: 0
EYE PAIN: 0
DIARRHEA: 0
MYALGIAS: 0
CHOKING: 0
FEVER: 0
HEADACHES: 0
ORTHOPNEA: 0
STRIDOR: 0
NAUSEA: 0

## 2021-05-09 ASSESSMENT — FIBROSIS 4 INDEX
FIB4 SCORE: 2.23
FIB4 SCORE: 2.23

## 2021-05-09 ASSESSMENT — COPD QUESTIONNAIRES
HAVE YOU SMOKED AT LEAST 100 CIGARETTES IN YOUR ENTIRE LIFE: YES
COPD SCREENING SCORE: 5
DO YOU EVER COUGH UP ANY MUCUS OR PHLEGM?: YES, A FEW DAYS A WEEK OR MONTH
DURING THE PAST 4 WEEKS HOW MUCH DID YOU FEEL SHORT OF BREATH: NONE/LITTLE OF THE TIME

## 2021-05-09 NOTE — PROGRESS NOTES
Cardiology follow-up.  Patient with PAF, only recently resumed flecainide.  We will give her some time on flecainide, metoprolol and digoxin.  Suspect digoxin will be discontinued.  If this is not successful then consider changing to amiodarone, I did discuss with Dr. Arita her primary cardiologist.  Agree with furosemide.  I will see her again tomorrow.

## 2021-05-09 NOTE — PROGRESS NOTES
Inpatient Anticoagulation Service Note    Date: 5/9/2021    Reason for Anticoagulation: Atrial Fibrillation   Target INR: 2.0 to 3.0  [id number] VASc Score: 5  HAS-BLED Score: 2   Hemoglobin Value: 14  Hematocrit Value: 41.1  Lab Platelet Value: 233    INR from last 7 days     Date/Time INR Value    05/09/21 1339  (!) 2.15    05/07/21 2214  (!) 2.01        Dose from last 7 days     Date/Time Dose (mg)    05/09/21 1200  5    05/08/21 0407  5            Average Dose (mg): 5(5mg all days except 2.5mg on Tuesday)  Significant Interactions: Statin  Bridge Therapy: No    Reversal Agent Administered: Not Applicable     Comments:   71 y/o F w/ PMHx  Afib s/p DC cardioversion on 5/6/2021, DM, HTN, HLD,?  COPD, former smoker with 50 pack years, MIKE on 2 L at nighttime, transferred from [place] with chief complaint of shortness of breath.  DDI noted. No documented signs of overt bleeding.     Plan:  Warfarin 5mg po tonight , INR in AM.   Education Material Provided?: No  Pharmacist suggested discharge dosing: Warfarin 5mg all days except 2.5mg on Tuesday. Follow up with anticoagulation clinic after discharge.      Gerardo Mensah, PharmD

## 2021-05-09 NOTE — PROGRESS NOTES
Received Bedside report. Assumed care at 1900. This pt is AOx4, ambulatory with standby assistance, voiding adequately, 0/10 pain. Patient and RN discussed plan of care: questions answered. Labs noted, assessment complete, patient tolerating regular diet. Tele box in place. Pt is on 2 L of O2 via nasal cannula. Call light in place, fall precautions in place, patient educated on importance of calling for assistance. No additional needs at this time. VSS

## 2021-05-09 NOTE — PROGRESS NOTES
Hospital Medicine Daily Progress Note    Date of Service  5/9/2021    Chief Complaint  80 y.o. female admitted 5/7/2021 with     Hospital Course  No notes on file   70-year-old female with history of A. fib status post DC cardioversion on 5/6/2021, DM, HTN, HLD,  COPD, former smoker with 50 pack years, MIKE on 2 L at nighttime, transferred from [place] with chief complaint of shortness of breath.  Her shortness of breath started the day after patient had cardioversion associated with  productive sputum, severe to the point that she could not complete sentences.  Not relieved with supplemental oxygen at home and rescue inhalers.  She also noticed wheezing but denies any chest pain, palpitation, syncope, leg swelling, orthopnea, PND.  She had CTA PE revealed bilateral pleural effusion,  groundglass opacity suggesting of pulmonary edema, and EKG demonstrated atrial flutter with 3-1 AV block, . Her echo resulted significant for moderate tricuspid regurgitation, RVSP of 46, EF of 65%   Urinalysis negative for infection, labs significant for leukocytosis WBC 16.6, patient lactate of 2.8, BNP 1305.  Her Covid and influenza virus tested negative     Patient was started on IV Unasyn/azithromycin, IV Lasix, IV Solu-Medrol for suspected acute COPD exacerbation/CHF exacerbation.    Interval Problem Update  Patient now requiring 2 L oxygen via nasal cannula, VSS, urine output 700 cc, leukocytosis 13.3 in the setting of IV steroids vs CAP will continue with IV antibiotics,  follow-up blood cultures, urine cultures, respiratory culture.  Taper supplemental oxygen as tolerated.  Continue with low-dose Lasix  We will continue to taper steroids, continue with ISS  Cardiology planning for DCCV in a.m.    Consultants/Specialty  Cardiology    Code Status  DNAR/DNI    Disposition  Pending medical clearance    Review of Systems  Review of Systems   Constitutional: Negative for chills and fever.   HENT: Negative for ear pain and sinus  pain.    Eyes: Negative for blurred vision and pain.   Respiratory: Positive for cough, sputum production, shortness of breath and wheezing. Negative for hemoptysis.    Cardiovascular: Negative for chest pain and orthopnea.   Gastrointestinal: Negative for diarrhea, nausea and vomiting.   Genitourinary: Negative for dysuria and hematuria.   Musculoskeletal: Negative for myalgias.   Skin: Negative for itching.   Neurological: Negative for tremors, focal weakness, weakness and headaches.   Psychiatric/Behavioral: Negative for suicidal ideas.        Physical Exam  Temp:  [35.9 °C (96.6 °F)-36.8 °C (98.3 °F)] 36.4 °C (97.6 °F)  Pulse:  [] 100  Resp:  [14-26] 15  BP: (110-148)/(53-94) 110/64  SpO2:  [91 %-95 %] 94 %    Physical Exam  Vitals and nursing note reviewed.   Constitutional:       Appearance: Normal appearance.   HENT:      Head: Normocephalic and atraumatic.      Right Ear: External ear normal.      Left Ear: External ear normal.      Mouth/Throat:      Mouth: Mucous membranes are moist.   Eyes:      Extraocular Movements: Extraocular movements intact.      Conjunctiva/sclera: Conjunctivae normal.      Pupils: Pupils are equal, round, and reactive to light.   Cardiovascular:      Rate and Rhythm: Normal rate. Rhythm irregular.      Pulses: Normal pulses.      Heart sounds: Normal heart sounds.   Pulmonary:      Effort: Pulmonary effort is normal.      Breath sounds: Wheezing present.   Abdominal:      General: Abdomen is flat. Bowel sounds are normal.      Palpations: Abdomen is soft.   Musculoskeletal:         General: Normal range of motion.      Cervical back: Normal range of motion and neck supple.   Skin:     General: Skin is warm.      Capillary Refill: Capillary refill takes less than 2 seconds.   Neurological:      General: No focal deficit present.      Mental Status: She is alert and oriented to person, place, and time.   Psychiatric:         Mood and Affect: Mood normal.          Fluids    Intake/Output Summary (Last 24 hours) at 5/9/2021 1529  Last data filed at 5/9/2021 1149  Gross per 24 hour   Intake --   Output 1100 ml   Net -1100 ml       Laboratory  Recent Labs     05/07/21 2214 05/08/21 0519 05/09/21  0209   WBC 16.6* 13.0* 13.9*   RBC 4.76 4.58 4.61   HEMOGLOBIN 14.3 13.4 14.0   HEMATOCRIT 44.1 43.1 41.1   MCV 92.6 94.1 89.2   MCH 30.0 29.3 30.4   MCHC 32.4* 31.1* 34.1   RDW 46.3 46.8 44.1   PLATELETCT 299 265 233   MPV 11.8 11.6 11.8     Recent Labs     05/07/21 2214 05/08/21 0519 05/09/21  0209   SODIUM 141 143 135   POTASSIUM 4.0 3.5* 4.0   CHLORIDE 102 102 98   CO2 28 32 23   GLUCOSE 147* 131* 184*   BUN 15 13 24*   CREATININE 0.87 0.91 0.98   CALCIUM 8.9 8.7 8.4*     Recent Labs     05/07/21 2214 05/09/21  1339   APTT 32.9  --    INR 2.01* 2.15*               Imaging  EC-ECHOCARDIOGRAM COMPLETE W/O CONT   Final Result      CT-CTA CHEST PULMONARY ARTERY W/ RECONS   Final Result         1. No CT evidence of pulmonary embolism.      .2. Interlobular septal thickening and hazy groundglass opacity throughout both lungs, most in the dependent portion of the right upper lobe. This could relate to moderate pulmonary edema.      3. Multiple moderate bilateral pleural effusions      4. Enlarged left atrium.      DX-CHEST-PORTABLE (1 VIEW)   Final Result         1. No acute cardiopulmonary abnormalities are identified.      OUTSIDE IMAGES-DX CHEST   Final Result           Assessment/Plan  * Dyspnea- (present on admission)  Assessment & Plan  Multifactorial -- aspiration PNA, combined HF exacerbation and mild COPD exacerbation  Abx  Diuresis  Nebs treatment, steroid  F/u echo    Acute exacerbation of CHF (congestive heart failure) (HCC)  Assessment & Plan  B/l pleural effusions noted on CTA PE  BNP 1305    S/p Lasix 40mg IV x1 at [facility] prior to airlift to [facility]  Cont Lasix 20mg IV daily  Strict I&O, daily weights, Na/Fluid restriction    Cont BB/ARB  Repeat echo with EF of  65%, TR and RVSP of 46  Cardiology f/u appreciated    Aspiration pneumonia (HCC)  Assessment & Plan  SOB, productive sputum  Right sided infiltrate, b/l pleural effusions 24hours after CVN  WBC 16.6k, LA 2.8    F/u sputum Cx, BCx, ngtd, PNA serology, procal   Aspiration precautions, speech eval cleared patient   Abx: Unasyn + azithromycin    Atrial fibrillation (CMS-HCC) [I48.91]- (present on admission)  Assessment & Plan  S/p DCCVN 5/6/2021  Currently in sinus rhtyhm    OAC: warfarin -- dosing and INR monitoring as per pharmacy  Cont Toprolol XL, Digoxin, flecainide  Repeat echo as per cardiology  Plan for DCCV on 5/10 as per Cardiology    Acute pulmonary edema (HCC)  Assessment & Plan  Acute b/l moderate pulmonary edema  Diuresis as tolerated  F/u echo  Cardiology following    Acute on chronic respiratory failure with hypoxia ([facility])  Assessment & Plan  Dependently on nightly 2-3L oxygen  Currently at baseline with 2-3L -- cont monitoring    Chronic obstructive pulmonary disease with acute exacerbation (HCC)  Assessment & Plan  Mild acute exacerbation  Solumederol 40mg q8 x9 doses ordered  Abx as noted in PNA  Nebs, aggressive pulm toilet  Avoid over-oxygenation, target sat 88-94%    Hyperglycemia  Assessment & Plan  ISS as she is on Solumedrol  Repeat HbA1c    Mood disorder ([facility])  Assessment & Plan  zoloft    Age-related physical debility  Assessment & Plan  PT/OT  Fall precautions    Sepsis ([facility])  Assessment & Plan  This is Sepsis Present on admission  SIRS criteria identified on my evaluation include: Tachycardia, with heart rate greater than 90 BPM, Leukocytosis, with WBC greater than 12,000 and Bandemia, greater than 10% bands  Source is pulm  Suspected onset of infection (date and time) 5/8/2021  Sepsis protocol initiated  Fluid resuscitation ordered per protocol  IV antibiotics as appropriate for source of sepsis  While organ dysfunction may be noted elsewhere in this problem list or in the chart, degree of  organ dysfunction does not meet CMS criteria for severe sepsis    Cautious with IVF given CHF        Leukocytosis  Assessment & Plan  Likely secondary to PNA/sepsis  Likely to increase more as pt is on Solumederol    Advance care planning  Assessment & Plan  Discussed plan of care for this admission with patient  DNR/DNI status confirmed - no CPR, defibrillation, intuabtion  AAOx4 at time of evaluation  Advance care planning: 15mins    Former tobacco use  Assessment & Plan  40-pack year smoking previously    Dyslipidemia, goal LDL below 100- (present on admission)  Assessment & Plan  statin    HTN (hypertension)- (present on admission)  Assessment & Plan  Losartan, metoprolol, digoxin       VTE prophylaxis: Warfarin

## 2021-05-09 NOTE — CARE PLAN
Problem: Safety  Goal: Will remain free from falls  Outcome: PROGRESSING AS EXPECTED  Note: Patient has remained free from falls over the course of her hospital stay.  Fall precautions in place.     Problem: Respiratory:  Goal: Respiratory status will improve  Outcome: PROGRESSING AS EXPECTED     Problem: Fluid Volume:  Goal: Will maintain balanced intake and output  Outcome: PROGRESSING AS EXPECTED

## 2021-05-10 ENCOUNTER — ANESTHESIA EVENT (OUTPATIENT)
Dept: CARDIOLOGY | Facility: MEDICAL CENTER | Age: 81
DRG: 871 | End: 2021-05-10
Payer: MEDICARE

## 2021-05-10 ENCOUNTER — APPOINTMENT (OUTPATIENT)
Dept: CARDIOLOGY | Facility: MEDICAL CENTER | Age: 81
DRG: 871 | End: 2021-05-10
Attending: NURSE PRACTITIONER
Payer: MEDICARE

## 2021-05-10 ENCOUNTER — ANESTHESIA (OUTPATIENT)
Dept: CARDIOLOGY | Facility: MEDICAL CENTER | Age: 81
DRG: 871 | End: 2021-05-10
Payer: MEDICARE

## 2021-05-10 LAB
ANION GAP SERPL CALC-SCNC: 10 MMOL/L (ref 7–16)
BACTERIA SPEC RESP CULT: NORMAL
BACTERIA WND AEROBE CULT: NORMAL
BASOPHILS # BLD AUTO: 0.1 % (ref 0–1.8)
BASOPHILS # BLD: 0.02 K/UL (ref 0–0.12)
BUN SERPL-MCNC: 26 MG/DL (ref 8–22)
CALCIUM SERPL-MCNC: 8.8 MG/DL (ref 8.5–10.5)
CHLORIDE SERPL-SCNC: 99 MMOL/L (ref 96–112)
CO2 SERPL-SCNC: 31 MMOL/L (ref 20–33)
CREAT SERPL-MCNC: 0.98 MG/DL (ref 0.5–1.4)
EKG IMPRESSION: NORMAL
EOSINOPHIL # BLD AUTO: 0 K/UL (ref 0–0.51)
EOSINOPHIL NFR BLD: 0 % (ref 0–6.9)
ERYTHROCYTE [DISTWIDTH] IN BLOOD BY AUTOMATED COUNT: 46.8 FL (ref 35.9–50)
GLUCOSE BLD-MCNC: 113 MG/DL (ref 65–99)
GLUCOSE BLD-MCNC: 161 MG/DL (ref 65–99)
GLUCOSE BLD-MCNC: 192 MG/DL (ref 65–99)
GLUCOSE BLD-MCNC: 203 MG/DL (ref 65–99)
GLUCOSE SERPL-MCNC: 131 MG/DL (ref 65–99)
GRAM STN SPEC: NORMAL
GRAM STN SPEC: NORMAL
HCT VFR BLD AUTO: 43.6 % (ref 37–47)
HGB BLD-MCNC: 13.7 G/DL (ref 12–16)
IMM GRANULOCYTES # BLD AUTO: 0.09 K/UL (ref 0–0.11)
IMM GRANULOCYTES NFR BLD AUTO: 0.5 % (ref 0–0.9)
INR PPP: 2.32 (ref 0.87–1.13)
LYMPHOCYTES # BLD AUTO: 1.06 K/UL (ref 1–4.8)
LYMPHOCYTES NFR BLD: 6.2 % (ref 22–41)
MCH RBC QN AUTO: 29 PG (ref 27–33)
MCHC RBC AUTO-ENTMCNC: 31.4 G/DL (ref 33.6–35)
MCV RBC AUTO: 92.4 FL (ref 81.4–97.8)
MONOCYTES # BLD AUTO: 1.15 K/UL (ref 0–0.85)
MONOCYTES NFR BLD AUTO: 6.7 % (ref 0–13.4)
NEUTROPHILS # BLD AUTO: 14.89 K/UL (ref 2–7.15)
NEUTROPHILS NFR BLD: 86.5 % (ref 44–72)
NRBC # BLD AUTO: 0 K/UL
NRBC BLD-RTO: 0 /100 WBC
PLATELET # BLD AUTO: 281 K/UL (ref 164–446)
PMV BLD AUTO: 12.1 FL (ref 9–12.9)
POTASSIUM SERPL-SCNC: 3.9 MMOL/L (ref 3.6–5.5)
PROCALCITONIN SERPL-MCNC: <0.05 NG/ML
PROTHROMBIN TIME: 26.2 SEC (ref 12–14.6)
RBC # BLD AUTO: 4.72 M/UL (ref 4.2–5.4)
SIGNIFICANT IND 70042: NORMAL
SITE SITE: NORMAL
SODIUM SERPL-SCNC: 140 MMOL/L (ref 135–145)
SOURCE SOURCE: NORMAL
WBC # BLD AUTO: 17.2 K/UL (ref 4.8–10.8)

## 2021-05-10 PROCEDURE — 85610 PROTHROMBIN TIME: CPT

## 2021-05-10 PROCEDURE — 700111 HCHG RX REV CODE 636 W/ 250 OVERRIDE (IP): Performed by: STUDENT IN AN ORGANIZED HEALTH CARE EDUCATION/TRAINING PROGRAM

## 2021-05-10 PROCEDURE — 99233 SBSQ HOSP IP/OBS HIGH 50: CPT | Performed by: STUDENT IN AN ORGANIZED HEALTH CARE EDUCATION/TRAINING PROGRAM

## 2021-05-10 PROCEDURE — 5A2204Z RESTORATION OF CARDIAC RHYTHM, SINGLE: ICD-10-PCS | Performed by: INTERNAL MEDICINE

## 2021-05-10 PROCEDURE — 99233 SBSQ HOSP IP/OBS HIGH 50: CPT | Mod: 25 | Performed by: INTERNAL MEDICINE

## 2021-05-10 PROCEDURE — 160002 HCHG RECOVERY MINUTES (STAT)

## 2021-05-10 PROCEDURE — 93005 ELECTROCARDIOGRAM TRACING: CPT | Performed by: INTERNAL MEDICINE

## 2021-05-10 PROCEDURE — 97165 OT EVAL LOW COMPLEX 30 MIN: CPT

## 2021-05-10 PROCEDURE — 94760 N-INVAS EAR/PLS OXIMETRY 1: CPT

## 2021-05-10 PROCEDURE — 770020 HCHG ROOM/CARE - TELE (206)

## 2021-05-10 PROCEDURE — 700105 HCHG RX REV CODE 258: Performed by: STUDENT IN AN ORGANIZED HEALTH CARE EDUCATION/TRAINING PROGRAM

## 2021-05-10 PROCEDURE — 80048 BASIC METABOLIC PNL TOTAL CA: CPT

## 2021-05-10 PROCEDURE — 85025 COMPLETE CBC W/AUTO DIFF WBC: CPT

## 2021-05-10 PROCEDURE — 84145 PROCALCITONIN (PCT): CPT

## 2021-05-10 PROCEDURE — 700101 HCHG RX REV CODE 250: Performed by: STUDENT IN AN ORGANIZED HEALTH CARE EDUCATION/TRAINING PROGRAM

## 2021-05-10 PROCEDURE — 92960 CARDIOVERSION ELECTRIC EXT: CPT | Performed by: INTERNAL MEDICINE

## 2021-05-10 PROCEDURE — 97161 PT EVAL LOW COMPLEX 20 MIN: CPT

## 2021-05-10 PROCEDURE — 92960 CARDIOVERSION ELECTRIC EXT: CPT

## 2021-05-10 PROCEDURE — 93010 ELECTROCARDIOGRAM REPORT: CPT | Performed by: INTERNAL MEDICINE

## 2021-05-10 PROCEDURE — 36415 COLL VENOUS BLD VENIPUNCTURE: CPT

## 2021-05-10 PROCEDURE — 82962 GLUCOSE BLOOD TEST: CPT

## 2021-05-10 PROCEDURE — 700111 HCHG RX REV CODE 636 W/ 250 OVERRIDE (IP): Performed by: INTERNAL MEDICINE

## 2021-05-10 PROCEDURE — 94669 MECHANICAL CHEST WALL OSCILL: CPT

## 2021-05-10 PROCEDURE — 700102 HCHG RX REV CODE 250 W/ 637 OVERRIDE(OP): Performed by: STUDENT IN AN ORGANIZED HEALTH CARE EDUCATION/TRAINING PROGRAM

## 2021-05-10 PROCEDURE — 94640 AIRWAY INHALATION TREATMENT: CPT

## 2021-05-10 PROCEDURE — A9270 NON-COVERED ITEM OR SERVICE: HCPCS | Performed by: STUDENT IN AN ORGANIZED HEALTH CARE EDUCATION/TRAINING PROGRAM

## 2021-05-10 RX ORDER — LEVALBUTEROL INHALATION SOLUTION 1.25 MG/3ML
1.25 SOLUTION RESPIRATORY (INHALATION)
Status: DISCONTINUED | OUTPATIENT
Start: 2021-05-10 | End: 2021-05-13 | Stop reason: HOSPADM

## 2021-05-10 RX ORDER — LOSARTAN POTASSIUM 50 MG/1
37.5 TABLET ORAL
Status: DISCONTINUED | OUTPATIENT
Start: 2021-05-11 | End: 2021-05-11

## 2021-05-10 RX ORDER — ONDANSETRON 2 MG/ML
4 INJECTION INTRAMUSCULAR; INTRAVENOUS
Status: DISCONTINUED | OUTPATIENT
Start: 2021-05-10 | End: 2021-05-10 | Stop reason: HOSPADM

## 2021-05-10 RX ORDER — SODIUM CHLORIDE, SODIUM LACTATE, POTASSIUM CHLORIDE, CALCIUM CHLORIDE 600; 310; 30; 20 MG/100ML; MG/100ML; MG/100ML; MG/100ML
INJECTION, SOLUTION INTRAVENOUS
Status: DISCONTINUED | OUTPATIENT
Start: 2021-05-10 | End: 2021-05-10 | Stop reason: SURG

## 2021-05-10 RX ADMIN — FLECAINIDE ACETATE 100 MG: 100 TABLET ORAL at 17:08

## 2021-05-10 RX ADMIN — WARFARIN SODIUM 5 MG: 5 TABLET ORAL at 18:15

## 2021-05-10 RX ADMIN — SODIUM CHLORIDE, POTASSIUM CHLORIDE, SODIUM LACTATE AND CALCIUM CHLORIDE: 600; 310; 30; 20 INJECTION, SOLUTION INTRAVENOUS at 13:55

## 2021-05-10 RX ADMIN — METHYLPREDNISOLONE SODIUM SUCCINATE 40 MG: 40 INJECTION, POWDER, FOR SOLUTION INTRAMUSCULAR; INTRAVENOUS at 17:08

## 2021-05-10 RX ADMIN — LEVALBUTEROL 1.25 MG: 1.25 SOLUTION RESPIRATORY (INHALATION) at 07:46

## 2021-05-10 RX ADMIN — METOPROLOL SUCCINATE 100 MG: 50 TABLET, EXTENDED RELEASE ORAL at 05:51

## 2021-05-10 RX ADMIN — AZITHROMYCIN MONOHYDRATE 500 MG: 250 TABLET ORAL at 05:52

## 2021-05-10 RX ADMIN — INSULIN HUMAN 1 UNITS: 100 INJECTION, SOLUTION PARENTERAL at 17:22

## 2021-05-10 RX ADMIN — FUROSEMIDE 20 MG: 10 INJECTION, SOLUTION INTRAMUSCULAR; INTRAVENOUS at 05:51

## 2021-05-10 RX ADMIN — AMPICILLIN SODIUM AND SULBACTAM SODIUM 3 G: 2; 1 INJECTION, POWDER, FOR SOLUTION INTRAMUSCULAR; INTRAVENOUS at 05:50

## 2021-05-10 RX ADMIN — ATORVASTATIN CALCIUM 40 MG: 40 TABLET, FILM COATED ORAL at 17:08

## 2021-05-10 RX ADMIN — AMPICILLIN SODIUM AND SULBACTAM SODIUM 3 G: 2; 1 INJECTION, POWDER, FOR SOLUTION INTRAMUSCULAR; INTRAVENOUS at 17:08

## 2021-05-10 RX ADMIN — LOSARTAN POTASSIUM 25 MG: 25 TABLET, FILM COATED ORAL at 05:52

## 2021-05-10 RX ADMIN — DIGOXIN 125 MCG: 125 TABLET ORAL at 05:53

## 2021-05-10 RX ADMIN — FLECAINIDE ACETATE 100 MG: 100 TABLET ORAL at 05:52

## 2021-05-10 RX ADMIN — AMPICILLIN SODIUM AND SULBACTAM SODIUM 3 G: 2; 1 INJECTION, POWDER, FOR SOLUTION INTRAMUSCULAR; INTRAVENOUS at 23:59

## 2021-05-10 RX ADMIN — PROPOFOL 40 MG: 10 INJECTION, EMULSION INTRAVENOUS at 14:00

## 2021-05-10 RX ADMIN — LORATADINE 10 MG: 10 TABLET ORAL at 05:51

## 2021-05-10 RX ADMIN — AMPICILLIN SODIUM AND SULBACTAM SODIUM 3 G: 2; 1 INJECTION, POWDER, FOR SOLUTION INTRAMUSCULAR; INTRAVENOUS at 11:04

## 2021-05-10 RX ADMIN — SERTRALINE HYDROCHLORIDE 100 MG: 100 TABLET ORAL at 05:52

## 2021-05-10 RX ADMIN — METHYLPREDNISOLONE SODIUM SUCCINATE 40 MG: 40 INJECTION, POWDER, FOR SOLUTION INTRAMUSCULAR; INTRAVENOUS at 05:50

## 2021-05-10 ASSESSMENT — COGNITIVE AND FUNCTIONAL STATUS - GENERAL
SUGGESTED CMS G CODE MODIFIER MOBILITY: CJ
MOBILITY SCORE: 21
CLIMB 3 TO 5 STEPS WITH RAILING: A LITTLE
WALKING IN HOSPITAL ROOM: A LITTLE
SUGGESTED CMS G CODE MODIFIER DAILY ACTIVITY: CH
DAILY ACTIVITIY SCORE: 24
MOVING FROM LYING ON BACK TO SITTING ON SIDE OF FLAT BED: A LITTLE

## 2021-05-10 ASSESSMENT — ENCOUNTER SYMPTOMS
WEAKNESS: 0
FEVER: 0
SINUS PAIN: 0
NAUSEA: 0
SPUTUM PRODUCTION: 1
TREMORS: 0
WHEEZING: 1
ORTHOPNEA: 0
VOMITING: 0
MYALGIAS: 0
EYE PAIN: 0
FOCAL WEAKNESS: 0
DIARRHEA: 0
HEMOPTYSIS: 0
SHORTNESS OF BREATH: 1
HEADACHES: 0
CHILLS: 0
COUGH: 1
BLURRED VISION: 0

## 2021-05-10 ASSESSMENT — GAIT ASSESSMENTS
GAIT LEVEL OF ASSIST: SUPERVISED
DEVIATION: SHUFFLED GAIT
DISTANCE (FEET): 50

## 2021-05-10 ASSESSMENT — FIBROSIS 4 INDEX: FIB4 SCORE: 1.85

## 2021-05-10 ASSESSMENT — PAIN SCALES - GENERAL: PAIN_LEVEL: 0

## 2021-05-10 ASSESSMENT — ACTIVITIES OF DAILY LIVING (ADL): TOILETING: INDEPENDENT

## 2021-05-10 NOTE — OR NURSING
1415 Pt arrived to PPU with procedure room RN.  AAOx4.  VSS.  Denies pain and nausea.Belongings returned to pt bedside.  POC updated with pt, all questions answered.    1425 EKG done    1436 Report called to Mee CONSTANTINO on tele.  Pt will be transported to [facility]    1500 Pt transported to room with PPU RN, monitor, chart, and all belongings.  Visualized site with RN.

## 2021-05-10 NOTE — ANESTHESIA TIME REPORT
Anesthesia Start and Stop Event Times     Date Time Event    5/10/2021 1347 Ready for Procedure     1355 Anesthesia Start     1418 Anesthesia Stop        Responsible Staff  05/10/21    Name Role Begin End    Min ANGELINE Sexton M.D. Anesth 1353 1418        Preop Diagnosis (Free Text):  Pre-op Diagnosis             Preop Diagnosis (Codes):    Post op Diagnosis  A-fib (HCC)      Premium Reason  Non-Premium    Comments:

## 2021-05-10 NOTE — ANESTHESIA PREPROCEDURE EVALUATION
79 yo F w/ hx of TIA, AFib s/p cardioversion on 5/6/21, HTN, COPD, MIKE on 2L at night, admitted back on 5/7/21 w/ SOB, found to have b/l pleural effusion, aspiriaton PNA, acute on chronic COPD exacerbation, acute CHF exacerbation and now back in AFib here for cardioversion. NPO appropriate.    Relevant Problems   PULMONARY   (+) Aspiration pneumonia (HCC)   (+) Bronchial asthma   (+) Chronic obstructive pulmonary disease with acute exacerbation (HCC)   (+) Dyspnea      NEURO   (+) TIA (transient ischemic attack)      CARDIAC   (+) Acute exacerbation of CHF (congestive heart failure) (HCC)   (+) Arrhythmia   (+) Atrial fibrillation (CMS-HCC) [I48.91]   (+) Cerebral atherosclerosis   (+) HTN (hypertension)   (+) Paroxysmal supraventricular tachycardia (HCC)         (+) Stage 1 chronic kidney disease      ENDO   (+) Hypothyroidism       Physical Exam    Airway   Mallampati: III  TM distance: >3 FB  Neck ROM: full       Cardiovascular - normal exam  Rhythm: irregular  Rate: normal  (-) murmur     Dental - normal exam           Pulmonary - normal exam  Breath sounds clear to auscultation     Abdominal    Neurological - normal exam               Anesthesia Plan    ASA 3   ASA physical status 3 criteria: COPD    Plan - MAC               Induction: intravenous    Postoperative Plan: Postoperative administration of opioids is intended.    Pertinent diagnostic labs and testing reviewed    Informed Consent:    Anesthetic plan and risks discussed with patient.    Use of blood products discussed with: patient whom consented to blood products.

## 2021-05-10 NOTE — PROCEDURES
Patient was sedated per Anesthesia. Please see their note for complete details.     MARIAN not performed. Patient therapeutic on anticoagulation. Had recent cardioversion as an outpatient.     After adequate sedation, 200J of synchronized cardioversion was attempted, resulting in sinus rhythm.     Continue current medication regimen.     Complications: none     Shwetha Garcia MD, [facility]  Cardiologist  [facility]

## 2021-05-10 NOTE — PROGRESS NOTES
Hospital Medicine Daily Progress Note    Date of Service  5/10/2021    Chief Complaint  80 y.o. female admitted 5/7/2021 with     Hospital Course  No notes on file   70-year-old female with history of A. fib status post DC cardioversion on 5/6/2021, DM, HTN, HLD,  COPD, former smoker with 50 pack years, MIKE on 2 L at nighttime, transferred from [place] with chief complaint of shortness of breath.  Her shortness of breath started the day after patient had cardioversion associated with  productive sputum, severe to the point that she could not complete sentences.  Not relieved with supplemental oxygen at home and rescue inhalers.  She also noticed wheezing but denies any chest pain, palpitation, syncope, leg swelling, orthopnea, PND.  She had CTA PE revealed bilateral pleural effusion,  groundglass opacity suggesting of pulmonary edema, and EKG demonstrated atrial flutter with 3-1 AV block, . Her echo resulted significant for moderate tricuspid regurgitation, RVSP of 46, EF of 65%   Urinalysis negative for infection, labs significant for leukocytosis WBC 16.6, patient lactate of 2.8, BNP 1305.  Her Covid and influenza virus tested negative     Patient was started on IV Unasyn/azithromycin, IV Lasix, IV Solu-Medrol for suspected acute COPD exacerbation.    Interval Problem Update  Patient now requiring 2 L oxygen via nasal cannula, VSS, urine output 700 cc, leukocytosis 13.3 in the setting of IV steroids vs CAP will continue with IV antibiotics,  follow-up blood cultures, urine cultures, respiratory culture.  Taper supplemental oxygen as tolerated.  Continue with low-dose Lasix  We will continue to taper steroids, continue with ISS  Cardiology planning for DCCV in a.m.   5/10: Endorses improvement in her shortness of breath, complains of productive cough, patient underwent cardioversion, converted to sinus rhythm on 5/10/2021  As per cardiology heart failure diagnosis has been ruled out.   Labs with leukocytosis  in setting of IV steroids for COPD exacerbation  Follow-up sputum cultures, blood culture from 5/9/2020 TD        Consultants/Specialty  Cardiology    Code Status  DNAR/DNI    Disposition  Home in 1-2 days    Review of Systems  Review of Systems   Constitutional: Negative for chills and fever.   HENT: Negative for ear pain and sinus pain.    Eyes: Negative for blurred vision and pain.   Respiratory: Positive for cough, sputum production, shortness of breath and wheezing. Negative for hemoptysis.    Cardiovascular: Negative for chest pain and orthopnea.   Gastrointestinal: Negative for diarrhea, nausea and vomiting.   Genitourinary: Negative for dysuria and hematuria.   Musculoskeletal: Negative for myalgias.   Skin: Negative for itching.   Neurological: Negative for tremors, focal weakness, weakness and headaches.   Psychiatric/Behavioral: Negative for suicidal ideas.        Physical Exam  Temp:  [35.8 °C (96.5 °F)-37 °C (98.6 °F)] 37 °C (98.6 °F)  Pulse:  [] 74  Resp:  [16-20] 16  BP: (132-164)/(51-90) 138/72  SpO2:  [90 %-97 %] 91 %    Physical Exam  Vitals and nursing note reviewed.   Constitutional:       Appearance: Normal appearance.   HENT:      Head: Normocephalic and atraumatic.      Right Ear: External ear normal.      Left Ear: External ear normal.      Mouth/Throat:      Mouth: Mucous membranes are moist.   Eyes:      Extraocular Movements: Extraocular movements intact.      Conjunctiva/sclera: Conjunctivae normal.      Pupils: Pupils are equal, round, and reactive to light.   Cardiovascular:      Rate and Rhythm: Normal rate and regular rhythm.      Pulses: Normal pulses.      Heart sounds: Normal heart sounds.   Pulmonary:      Effort: Pulmonary effort is normal.      Breath sounds: Wheezing present.   Abdominal:      General: Abdomen is flat. Bowel sounds are normal.      Palpations: Abdomen is soft.   Musculoskeletal:         General: Normal range of motion.      Cervical back: Normal range  of motion and neck supple.   Skin:     General: Skin is warm.      Capillary Refill: Capillary refill takes less than 2 seconds.   Neurological:      General: No focal deficit present.      Mental Status: She is alert and oriented to person, place, and time.   Psychiatric:         Mood and Affect: Mood normal.         Fluids    Intake/Output Summary (Last 24 hours) at 5/10/2021 1552  Last data filed at 5/10/2021 1418  Gross per 24 hour   Intake 580 ml   Output 450 ml   Net 130 ml       Laboratory  Recent Labs     05/08/21  0519 05/09/21  0209 05/10/21  0250   WBC 13.0* 13.9* 17.2*   RBC 4.58 4.61 4.72   HEMOGLOBIN 13.4 14.0 13.7   HEMATOCRIT 43.1 41.1 43.6   MCV 94.1 89.2 92.4   MCH 29.3 30.4 29.0   MCHC 31.1* 34.1 31.4*   RDW 46.8 44.1 46.8   PLATELETCT 265 233 281   MPV 11.6 11.8 12.1     Recent Labs     05/08/21  0519 05/09/21  0209 05/10/21  0250   SODIUM 143 135 140   POTASSIUM 3.5* 4.0 3.9   CHLORIDE 102 98 99   CO2 32 23 31   GLUCOSE 131* 184* 131*   BUN 13 24* 26*   CREATININE 0.91 0.98 0.98   CALCIUM 8.7 8.4* 8.8     Recent Labs     05/07/21  2214 05/09/21  1339 05/10/21  0250   APTT 32.9  --   --    INR 2.01* 2.15* 2.32*               Imaging  EC-ECHOCARDIOGRAM COMPLETE W/O CONT   Final Result      CT-CTA CHEST PULMONARY ARTERY W/ RECONS   Final Result         1. No CT evidence of pulmonary embolism.      .2. Interlobular septal thickening and hazy groundglass opacity throughout both lungs, most in the dependent portion of the right upper lobe. This could relate to moderate pulmonary edema.      3. Multiple moderate bilateral pleural effusions      4. Enlarged left atrium.      DX-CHEST-PORTABLE (1 VIEW)   Final Result         1. No acute cardiopulmonary abnormalities are identified.      OUTSIDE IMAGES-DX CHEST   Final Result      CL-CARDIOVERSION    (Results Pending)   CL-CARDIOVERSION    (Results Pending)        Assessment/Plan  * Dyspnea- (present on admission)  Assessment & Plan  Multifactorial --  likely in the setting of pulmonary edema and  COPD exacerbation  Abx  Diuresis  Nebs treatment, steroid      Aspiration pneumonia (HCC)  Assessment & Plan  SOB, productive sputum  Right sided infiltrate, b/l pleural effusions 24hours after CVN  WBC 16.6k, LA 2.8    F/u sputum Cx, BCx, ngtd, PNA serology, procal   Aspiration precautions, speech eval cleared patient   Abx: Unasyn + azithromycin    Atrial fibrillation (CMS-HCC) [I48.91]- (present on admission)  Assessment & Plan  S/p DCCVN 5/6/2021, underwent cardioversion on 5/10/21  Currently in sinus [facility]    OAC: warfarin -- dosing and INR monitoring as per pharmacy  Cont Toprolol XL, Digoxin, flecainide  Repeat echo as per cardiology      Acute pulmonary edema (HCC)  Assessment & Plan  Acute b/l moderate pulmonary edema  Diuresis as tolerated  F/u echo  Cardiology following    Acute on chronic respiratory failure with hypoxia ([facility])  Assessment & Plan  Dependently on nightly 2-3L oxygen  Currently at baseline with 2-3L -- cont monitoring    Chronic obstructive pulmonary disease with acute exacerbation (HCC)  Assessment & Plan  Mild acute exacerbation  Solumederol 40mg q8 x9 doses ordered, can DC on medrol pack  Abx as noted in PNA  Nebs, aggressive pulm toilet  Avoid over-oxygenation, target sat 88-94%    Hyperglycemia  Assessment & Plan  ISS as she is on Solumedrol  Repeat HbA1c    Mood disorder (HCC)  Assessment & Plan  zoloft    Age-related physical debility  Assessment & Plan  PT/OT  Fall precautions    Sepsis ([facility])  Assessment & Plan  This is Sepsis Present on admission  SIRS criteria identified on my evaluation include: Tachycardia, with heart rate greater than 90 BPM, Leukocytosis, with WBC greater than 12,000 and Bandemia, greater than 10% bands  Source is pulm  Suspected onset of infection (date and time) 5/8/2021  Sepsis protocol initiated  Fluid resuscitation ordered per protocol  IV antibiotics as appropriate for source of sepsis  While organ  dysfunction may be noted elsewhere in this problem list or in the chart, degree of organ dysfunction does not meet CMS criteria for severe sepsis            Leukocytosis  Assessment & Plan  Likely secondary to PNA/sepsis  Secondary to steroids    Advance care planning  Assessment & Plan  Discussed plan of care for this admission with patient  DNR/DNI status confirmed - no CPR, defibrillation, intuabtion  AAOx4 at time of evaluation  Advance care planning: 15mins    Former tobacco use  Assessment & Plan  40-pack year smoking previously    Dyslipidemia, goal LDL below 100- (present on admission)  Assessment & Plan  statin    HTN (hypertension)- (present on admission)  Assessment & Plan  Losartan, metoprolol, digoxin       VTE prophylaxis: Warfarin

## 2021-05-10 NOTE — PROGRESS NOTES
Received Bedside report. Assumed care at 1900. This pt is AOx4, ambulatory with standby assistance, voiding adequately, 0/10 pain. Patient and RN discussed plan of care: questions answered. Labs noted, assessment complete, patient tolerating cardiac, 2 gram sodium diet with a 1500 mL fluid restriction. Tele box in place. Pt is on 2 L of O2 via nasal cannula. Call light in place, fall precautions in place, patient educated on importance of calling for assistance. No additional needs at this time. VSS

## 2021-05-10 NOTE — THERAPY
"Occupational Therapy   Initial Evaluation     Patient Name: Laurie Adams  Age:  80 y.o., Sex:  female  Medical Record #: [id number]  Today's Date: 5/10/2021     Precautions  Precautions: Anterior Hip Precautions  Comments: pt with recent cardioversion per EMR    Assessment  Patient is 80 y.o. female admitted for  SOB, PMHx: DM, HTN, HLD and COPD. This admission pt is dx w/dyspnea, acute exacerbation of CHF, aspiration pneumonia, Afib, acute pulmonary edema. At this time pt was able to complete all ADL's and txfs at a spv level w/no overt c/o pain or fatigue. Anticipate no further OT needs     Plan  Recommend Occupational Therapy for Evaluation only    DC Equipment Recommendations: None  Discharge Recommendations: Anticipate that the patient will have no further occupational therapy needs after discharge from the hospital     Subjective  \" I make dinner and my  does the dishes\"      Objective     05/10/21 1130   Prior Living Situation   Prior Services None   Housing / Facility 1 Story House   Steps Into Home 0   Steps In Home 0   Bathroom Set up Bathtub / Shower Combination   Equipment Owned Single Point Cane   Lives with - Patient's Self Care Capacity Spouse   Comments spouse is waiting for a hip replacement and unable to assist   Prior Level of ADL Function   Self Feeding Independent   Grooming / Hygiene Independent   Bathing Independent   Dressing Independent   Toileting Independent   Prior Level of IADL Function   Medication Management Independent   Laundry Independent   Kitchen Mobility Independent   Finances Independent   Home Management Independent   Shopping Independent   Prior Level Of Mobility Independent Without Device in Community   Driving / Transportation Driving Independent   Precautions   Precautions Anterior Hip Precautions   Comments pt with recent cardioversion per EMR   Pain 0 - 10 Group   Therapist Pain Assessment 0;Nurse Notified   Cognition    Cognition / Consciousness WDL   Level " of Consciousness Alert   Comments pleasant and cooperative    Passive ROM Upper Body   Passive ROM Upper Body WDL   Active ROM Upper Body   Active ROM Upper Body  WDL   Strength Upper Body   Upper Body Strength  WDL   Sensation Upper Body   Upper Extremity Sensation  WDL   Upper Body Muscle Tone   Upper Body Muscle Tone  WDL   Neurological Concerns   Neurological Concerns No   Coordination Upper Body   Coordination WDL   Balance Assessment   Sitting Balance (Static) Fair +   Sitting Balance (Dynamic) Fair +   Standing Balance (Static) Fair   Standing Balance (Dynamic) Fair   Weight Shift Sitting Fair   Weight Shift Standing Fair   Comments no AD    Bed Mobility    Supine to Sit Supervised   Sit to Supine Supervised   Scooting Supervised   Rolling Supervised   ADL Assessment   Grooming Supervision;Standing   Upper Body Dressing Supervision   Lower Body Dressing Supervision   Toileting Supervision   How much help from another person does the patient currently need...   6 Clicks Daily Activity Score 24   Functional Mobility   Sit to Stand Supervised   Bed, Chair, Wheelchair Transfer Supervised   Toilet Transfers Supervised   Mobility walking in room no AD    Visual Perception   Visual Perception  Not Tested   Activity Tolerance   Comments no overt c/o pain or fatigue    Education Group   Role of Occupational Therapist Patient Response Patient;Acceptance;Explanation   Problem List   Problem List None   Anticipated Discharge Equipment and Recommendations   DC Equipment Recommendations None   Discharge Recommendations Anticipate that the patient will have no further occupational therapy needs after discharge from the hospital   Interdisciplinary Plan of Care Collaboration   IDT Collaboration with  Nursing   Patient Position at End of Therapy In Bed;Call Light within Reach;Tray Table within Reach;Phone within Reach   Collaboration Comments RN aware of OT eval and pts efforts    Session Information   Date / Session Number   5/10 #1 eval only    Priority 0

## 2021-05-10 NOTE — ANESTHESIA POSTPROCEDURE EVALUATION
Patient: Laurie Adams    Procedure Summary     Date: 05/10/21 Room / Location: [facility] - ECHOCARDIOLOGY [facility]    Anesthesia Start: 1355 Anesthesia Stop: 1418    Procedure: CL-CARDIOVERSION Diagnosis:             Pneumonia            Pneumonia      (See Assoicated Dx)    Scheduled Providers: Shwetha Garcia M.D. Responsible Provider: Rodriguez Sexton M.D.    Anesthesia Type: MAC ASA Status: 3          Final Anesthesia Type: MAC  Last vitals  BP   Blood Pressure : 159/64    Temp   37 °C (98.6 °F)    Pulse   72   Resp   16    SpO2   95 %      Anesthesia Post Evaluation    Patient location during evaluation: PACU  Patient participation: complete - patient participated  Level of consciousness: awake and alert  Pain score: 0    Airway patency: patent  Anesthetic complications: no  Cardiovascular status: hemodynamically stable  Respiratory status: acceptable  Hydration status: euvolemic    PONV: none          No complications documented.     Nurse Pain Score: 0 (NPRS)

## 2021-05-10 NOTE — PROGRESS NOTES
[facility] Cardiology Progress Note    Name:   Laurie Adams   YOB: 1940  Age:   80 y.o.  female   MRN:   [id number]    Consulting Cardiologist: Dr. Garcia    Chief Complaint: Shortness of breath    Past Medical History:  81yo female with paroxysmal atrial fibrillation, previously controlled on flecainide however this was stopped in March 2020 after a hospitalization for pneumonia. Since stopped her antiarrhythmic she has been in symptomatic atrial fibrillation. Recently seen by Dr. Arita 5/4/21 in the outpatient clinic where he restarted Flecainide and performed successful cardioversion. She was discharged home and the following day developed severe shortness of breath with cough and was transferred from [facility] to [facility] for further care.     This hospitalization she is being treated for aspiration pneumonia (hypoxia, leukocytosis now resolved with ABX), she was also found to be back in atrial fibrillation.     Other medical problems include HTN, TIAs, obesity, HLD, nocturnal O2 use.    History of Present Illness:  She is laying flat in bed when I examine her without orthopnea. Her breathing has improved significantly, she has a residual productive cough. Continues to have palpitations at rest.       ROS  Constitutional:  + fatigue.  Weight stable.  Respiratory:  improved shortness of breath, +productive cough.  Cardiovascular:  No chest pain.  No lower extremity edema, No orthopnea or PND. + palpitations.  : + polyuria (diuretics), no dysuria. No hematuria.  GI:  Denies nausea/vomiting.  No abdominal distention. No melena/bloody stools.  Neuro:  Denies dizziness, syncope.  Hem/lymph: Denies easy bleeding/bruising.    MS: no arthralgias or myalgias.    All other review of systems reviewed and negative.    Past Surgical History:   Procedure Laterality Date   • KNEE ARTHROPLASTY TOTAL Right 6/7/2018    Procedure: KNEE ARTHROPLASTY TOTAL;  Surgeon: Eric Bunn M.D.;  Location:  SURGERY [facility];  Service: Orthopedics   • CATARACT PHACO WITH IOL  2013    Performed by Noe Jean M.D. at SURGERY [facility]   • CATARACT PHACO WITH IOL  2013    Performed by Noe Jean M.D. at SURGERY [facility]   • SEPTOPLASTY  2009    Performed by PABLITO LORENZANA at SURGERY [facility]   • SOMNOPLASTY  2009    Performed by PABLITO LORENZANA at SURGERY [facility]   • ANTROSTOMY  2009    Performed by PABLITO LORENZANA at SURGERY [facility]   • ETHMOIDECTOMY  2009    Performed by PABLITO LORENZANA at SURGERY [facility]   • OTHER      sinus   • COLON RESECTION     • CHOLECYSTECTOMY     • APPENDECTOMY     • OTHER      mastoid   • GANGLION EXCISION Bilateral ,     left wrist, right finger   • HYSTERECTOMY, VAGINAL     • MASTOIDECTOMY     • PB CHOLECYSTOENTEROSTOMY+VIVIANA-EN-Y     • PB NASAL SCOPY,REPB CSF LEAK,SPHENOID     • WRIST FUSION         No family history on file.    Social History     Socioeconomic History   • Marital status:      Spouse name: Not on file   • Number of children: Not on file   • Years of education: Not on file   • Highest education level: Not on file   Occupational History   • Not on file   Tobacco Use   • Smoking status: Former Smoker     Packs/day: 1.00     Years: 40.00     Pack years: 40.00     Types: Cigarettes     Quit date: 1999     Years since quittin.7   • Smokeless tobacco: Never Used   Substance and Sexual Activity   • Alcohol use: No   • Drug use: No   • Sexual activity: Yes     Partners: Male   Other Topics Concern   • Not on file   Social History Narrative   • Not on file     Social Determinants of Health     Financial Resource Strain:    • Difficulty of Paying Living Expenses:    Food Insecurity:    • Worried About Running Out of Food in the Last Year:    • Ran Out of Food in the Last Year:    Transportation Needs:    • Lack of Transportation  (Medical):    • Lack of Transportation (Non-Medical):    Physical Activity:    • Days of Exercise per Week:    • Minutes of Exercise per Session:    Stress:    • Feeling of Stress :    Social Connections:    • Frequency of Communication with Friends and Family:    • Frequency of Social Gatherings with Friends and Family:    • Attends Sabianist Services:    • Active Member of Clubs or Organizations:    • Attends Club or Organization Meetings:    • Marital Status:    Intimate Partner Violence:    • Fear of Current or Ex-Partner:    • Emotionally Abused:    • Physically Abused:    • Sexually Abused:        Medications / Drug list prior to admission:  No current facility-administered medications on file prior to encounter.     Current Outpatient Medications on File Prior to Encounter   Medication Sig Dispense Refill   • Biotin 1000 MCG Tab Take 1,000 mcg by mouth every day.     • Multiple Vitamins-Minerals (PRESERVISION AREDS 2) Cap Take 1 capsule by mouth every day.     • Cholecalciferol (VITAMIN D-3) 125 MCG (5000 UT) Tab Take 5,000 Units by mouth every day.     • Cyanocobalamin (VITAMIN B-12) 5000 MCG TABLET DISPERSIBLE Take 5,000 Units by mouth every day.     • amoxicillin (AMOXIL) 500 MG Cap Take 500 mg by mouth 3 times a day. For 10 days     • fluticasone (FLONASE) 50 MCG/ACT nasal spray Administer 1 Spray into affected nostril(S) 1 time a day as needed (allergys).     • diphenhydrAMINE HCl (BENADRYL PO) Take 1 tablet by mouth at bedtime as needed (sleep).     • Acetaminophen (TYLENOL PO) Take 1 tablet by mouth 1 time a day as needed (pain).     • loratadine (CLARITIN) 10 MG Tab Take 10 mg by mouth every day.     • losartan (COZAAR) 25 MG Tab Take 25 mg by mouth every day.     • flecainide (TAMBOCOR) 100 MG Tab Take 1 tablet by mouth 2 times a day. 60 tablet 11   • metoprolol SR (TOPROL XL) 100 MG TABLET SR 24 HR Take 1 tablet by mouth once daily (Patient taking differently: Take 100 mg by mouth every day.) 90  "tablet 0   • digoxin (LANOXIN) 125 MCG Tab Take 1 Tab by mouth every day. 30 Tab 11   • warfarin (COUMADIN) 5 MG Tab TAKE 1 TO 1 & 1/2 (ONE TO ONE & ONE-HALF) TABLETS BY MOUTH ONCE DAILY (Patient taking differently: Take 2.5-5 mg by mouth every day. Take 1 tablet = 5 mg every Mon, Wens, Thurs, Fri, Sat, Sun  Take 1/2 tablet = 2.5 mg every Tues.) 135 Tab 3   • atorvastatin (LIPITOR) 40 MG Tab Take 1 tablet by mouth once daily (Patient taking differently: Take 40 mg by mouth every evening.) 90 Tab 3   • albuterol (VENTOLIN HFA) 108 (90 Base) MCG/ACT Aero Soln inhalation aerosol Ventolin HFA 90 mcg/actuation aerosol inhaler (Patient taking differently: Inhale 2 Puffs every 6 hours as needed for Shortness of Breath.) 8.5 g 11   • SERTRALINE 100 MG TABS Take 150 mg by mouth every day. 1 1/2 tablet = 150 mg         Allergies   Allergen Reactions   • Feldene [Piroxicam] Photosensitivity   • Sulfa Drugs Hives   • Codeine Anxiety       Physical Exam  Body mass index is 31.52 kg/m². BP (!) 164/90   Pulse 92   Temp 35.8 °C (96.5 °F)   Resp 18   Ht 1.626 m (5' 4\")   Wt 83.3 kg (183 lb 10.3 oz)   SpO2 96%    Vitals:    05/10/21 0551 05/10/21 0730 05/10/21 0746 05/10/21 0853   BP: 148/51 (!) 164/90     Pulse: 90 85 92    Resp:  18 18    Temp:  35.8 °C (96.5 °F)     TempSrc:       SpO2:  97% 96%    Weight:    83.3 kg (183 lb 10.3 oz)   Height:        Oxygen Therapy:  Pulse Oximetry: 96 %, O2 (LPM): 2, O2 Delivery Device: Silicone Nasal Cannula    General: no apparent distress  Eyes: normal conjunctiva  ENT: OP clear  Neck: + JVD   Lungs: normal respiratory effort, No rales, wheezing or rhonchi.  Heart: normal rate, irregular rhythm, No murmur, no rubs or gallops,   EXT: No edema bilateral lower extremities. + bilateral pedal pulses. no cyanosis  Abdomen: soft, non tender, non distended,  Neurological: No focal deficits, no facial asymmetry.  Normal speech.  Psychiatric: Appropriate affect, alert and oriented x 3.   Skin: " Warm extremities, no rash.    Labs (personally reviewed):     Lab Results   Component Value Date/Time    SODIUM 140 05/10/2021 02:50 AM    POTASSIUM 3.9 05/10/2021 02:50 AM    CHLORIDE 99 05/10/2021 02:50 AM    CO2 31 05/10/2021 02:50 AM    GLUCOSE 131 (H) 05/10/2021 02:50 AM    BUN 26 (H) 05/10/2021 02:50 AM    CREATININE 0.98 05/10/2021 02:50 AM    CREATININE 0.8 12/11/2007 09:45 AM     Lab Results   Component Value Date/Time    ALKPHOSPHAT 83 05/09/2021 02:09 AM    ASTSGOT 24 05/09/2021 02:09 AM    ALTSGPT 14 05/09/2021 02:09 AM    TBILIRUBIN 0.5 05/09/2021 02:09 AM      No results found for: CHOLSTRLTOT, LDL, HDL, TRIGLYCERIDE  No results found for: BNPBTYPENAT      Cardiac Imaging and Procedures Review:      Personal Telemetry Review:     Personal EKG Interpretation: Atrial fibrillation, rate controlled with QRS 84ms, normal QTc    Echo 5/8/21:  CONCLUSIONS  No prior study is available for comparison.   Normal left ventricular systolic function.  Left ventricular ejection fraction is visually estimated to be 65%.  Normal right ventricular size and systolic function.  Moderate tricuspid regurgitation. Estimated right ventricular systolic   pressure is 46  mmHg.  Biatrial enlargement.      Assessment and Medical Decision Making:  Persistent Atrial Fibrillation  - symptomatic despite rate control  - Flecainide 100mg bid (Narrow QRS), Digoxin 125mcg (dig level 1.1 although this is non-trough), Metop SR 100mg  - CV today  - if fails will need to try other antiarrhythmics    Chronic OAC  - Warfarin, INR therapeutic    Acute Hypoxic Respiratory failure  Aspiration Pneumonia  - improving with ABX  - initially concerned for HF exacerbation causing acute respiratory failure, however on exam she appears well compensated. She presented meeting SIRS critieria and hospitalist is treating for pulm infection and COPD exacerbation.   - at this time, no HF exacerbation diagnosis      Please see Dr. Garcia's attestation for  additions and further recommendations.    Giana Melendez PA-C  [facility]

## 2021-05-10 NOTE — CARE PLAN
Problem: Discharge Barriers/Planning  Goal: Patient's continuum of care needs will be met  Outcome: PROGRESSING AS EXPECTED  Note: Patient is having a cardioversion performed in the morning     Problem: Respiratory:  Goal: Respiratory status will improve  Outcome: PROGRESSING SLOWER THAN EXPECTED  Note: Patient is remaining on 2 L of oxygen via nasal cannula

## 2021-05-10 NOTE — PROGRESS NOTES
Inpatient Anticoagulation Service Note    Date: 5/10/2021  Reason for Anticoagulation: Atrial Fibrillation   AHS4HG5 VASc Score: 5  HAS-BLED Score: 2    Hemoglobin Value: 13.7  Hematocrit Value: 43.6  Lab Platelet Value: 281  Target INR: 2.0 to 3.0    INR from last 7 days     Date/Time INR Value    05/10/21 0250  (!) 2.32    21 1339  (!) 2.15    21 2214  (!) 2.01        Dose from last 7 days     Date/Time Dose (mg)    05/10/21 1338  5    21 1200  --    21 0407  5        Average Dose (mg): 5  Significant Interactions: Statin, Corticosteroids, Antibiotics  Bridge Therapy: No     Reversal Agent Administered: Not Applicable  Comments: 79 y/o F w/ PMHx  Afib s/p DC cardioversion on 2021, DM, HTN, HLD,?  COPD, former smoker with 50 pack years, MIKE on 2 L at nighttime, transferred from [place] with chief complaint of shortness of breath.      Plan:  Continue home dosing 2.5mg on Tues, 5mg AOD. DDI noted. No documented signs of overt bleeding. H/h stable.    Education Material Provided?: No(on chronic therapy at home)  Pharmacist suggested discharge dosin.5mg on Tues, 5mg AOD - follow up INR within 4-5 days of discharge.     Chaz Rios, PharmD

## 2021-05-10 NOTE — THERAPY
Physical Therapy   Initial Evaluation     Patient Name: Laurie Adams  Age:  80 y.o., Sex:  female  Medical Record #: [id number]  Today's Date: 5/10/2021     Precautions: Fall Risk    Assessment  Patient is 80 y.o. female admitted for SOB, PNA, COPD exacerbation, and CHF exacerbation. PMH includes TIA, Afib, HTN, COPD, MIKE. Pt appears to be at her functional baseline ambulating in room with no AD and SPV. No further acute PT needs.     Plan    Recommend Physical Therapy for Evaluation only    DC Equipment Recommendations: None  Discharge Recommendations: Anticipate that the patient will have no further physical therapy needs after discharge from the hospital          05/10/21 1101   Prior Living Situation   Prior Services None   Housing / Facility 1 Story House   Steps Into Home 0   Steps In Home 0   Equipment Owned Single Point Cane   Lives with - Patient's Self Care Capacity Spouse   Comments spouse is waiting for a hip replacement and unable to assist   Prior Level of Functional Mobility   Bed Mobility Independent   Transfer Status Independent   Ambulation Independent   Distance Ambulation (Feet)   (community)   Assistive Devices Used Single Point Cane   Comments independent prior    Cognition    Level of Consciousness Alert   Comments receptive and cooperative   Gait Analysis   Gait Level Of Assist Supervised   Assistive Device None   Distance (Feet) 50   # of Times Distance was Traveled 1   Deviation Shuffled Gait   # of Stairs Climbed 0   Weight Bearing Status no restrictions   Comments occaisionally reaching for support   Bed Mobility    Supine to Sit Supervised   Sit to Supine Supervised   Scooting Supervised   Rolling Supervised   Functional Mobility   Sit to Stand Supervised   Bed, Chair, Wheelchair Transfer Supervised   Toilet Transfers Supervised   Mobility in room   Anticipated Discharge Equipment and Recommendations   DC Equipment Recommendations None   Discharge Recommendations Anticipate that  the patient will have no further physical therapy needs after discharge from the hospital

## 2021-05-10 NOTE — HEART FAILURE PROGRAM
Messaged Dr. Garcia. Asked that she clarify the tentative heart failure diagnosis given by Cardiology APRN yesterday and the definitive one given by hospital medicine.    Per our discussion there is no heart failure diagnosis at this time and she will document as such.    Thank you, Aster Cardio RN Navigator [phone]

## 2021-05-11 ENCOUNTER — APPOINTMENT (OUTPATIENT)
Dept: CARDIOLOGY | Facility: MEDICAL CENTER | Age: 81
DRG: 871 | End: 2021-05-11
Attending: NURSE PRACTITIONER
Payer: MEDICARE

## 2021-05-11 LAB
ANION GAP SERPL CALC-SCNC: 10 MMOL/L (ref 7–16)
BASOPHILS # BLD AUTO: 0 % (ref 0–1.8)
BASOPHILS # BLD: 0 K/UL (ref 0–0.12)
BUN SERPL-MCNC: 31 MG/DL (ref 8–22)
CALCIUM SERPL-MCNC: 8.7 MG/DL (ref 8.5–10.5)
CHLORIDE SERPL-SCNC: 99 MMOL/L (ref 96–112)
CO2 SERPL-SCNC: 32 MMOL/L (ref 20–33)
CREAT SERPL-MCNC: 0.95 MG/DL (ref 0.5–1.4)
EOSINOPHIL # BLD AUTO: 0 K/UL (ref 0–0.51)
EOSINOPHIL NFR BLD: 0 % (ref 0–6.9)
ERYTHROCYTE [DISTWIDTH] IN BLOOD BY AUTOMATED COUNT: 47.6 FL (ref 35.9–50)
GLUCOSE BLD-MCNC: 102 MG/DL (ref 65–99)
GLUCOSE BLD-MCNC: 116 MG/DL (ref 65–99)
GLUCOSE BLD-MCNC: 124 MG/DL (ref 65–99)
GLUCOSE BLD-MCNC: 93 MG/DL (ref 65–99)
GLUCOSE SERPL-MCNC: 130 MG/DL (ref 65–99)
GRAM STN SPEC: NORMAL
HCT VFR BLD AUTO: 41.3 % (ref 37–47)
HGB BLD-MCNC: 13.2 G/DL (ref 12–16)
IMM GRANULOCYTES # BLD AUTO: 0.08 K/UL (ref 0–0.11)
IMM GRANULOCYTES NFR BLD AUTO: 0.6 % (ref 0–0.9)
INR PPP: 2.48 (ref 0.87–1.13)
LYMPHOCYTES # BLD AUTO: 1.07 K/UL (ref 1–4.8)
LYMPHOCYTES NFR BLD: 8.4 % (ref 22–41)
M PNEUMO IGM SER IA-ACNC: 0.02 U/L
MCH RBC QN AUTO: 29.9 PG (ref 27–33)
MCHC RBC AUTO-ENTMCNC: 32 G/DL (ref 33.6–35)
MCV RBC AUTO: 93.7 FL (ref 81.4–97.8)
MONOCYTES # BLD AUTO: 0.87 K/UL (ref 0–0.85)
MONOCYTES NFR BLD AUTO: 6.9 % (ref 0–13.4)
NEUTROPHILS # BLD AUTO: 10.67 K/UL (ref 2–7.15)
NEUTROPHILS NFR BLD: 84.1 % (ref 44–72)
NRBC # BLD AUTO: 0 K/UL
NRBC BLD-RTO: 0 /100 WBC
PLATELET # BLD AUTO: 281 K/UL (ref 164–446)
PMV BLD AUTO: 11.6 FL (ref 9–12.9)
POTASSIUM SERPL-SCNC: 4 MMOL/L (ref 3.6–5.5)
PROTHROMBIN TIME: 27.6 SEC (ref 12–14.6)
RBC # BLD AUTO: 4.41 M/UL (ref 4.2–5.4)
SIGNIFICANT IND 70042: NORMAL
SITE SITE: NORMAL
SODIUM SERPL-SCNC: 141 MMOL/L (ref 135–145)
SOURCE SOURCE: NORMAL
WBC # BLD AUTO: 12.7 K/UL (ref 4.8–10.8)

## 2021-05-11 PROCEDURE — 700102 HCHG RX REV CODE 250 W/ 637 OVERRIDE(OP): Performed by: INTERNAL MEDICINE

## 2021-05-11 PROCEDURE — 700102 HCHG RX REV CODE 250 W/ 637 OVERRIDE(OP): Performed by: STUDENT IN AN ORGANIZED HEALTH CARE EDUCATION/TRAINING PROGRAM

## 2021-05-11 PROCEDURE — A9270 NON-COVERED ITEM OR SERVICE: HCPCS | Performed by: STUDENT IN AN ORGANIZED HEALTH CARE EDUCATION/TRAINING PROGRAM

## 2021-05-11 PROCEDURE — 82962 GLUCOSE BLOOD TEST: CPT | Mod: 91

## 2021-05-11 PROCEDURE — 36415 COLL VENOUS BLD VENIPUNCTURE: CPT

## 2021-05-11 PROCEDURE — 99233 SBSQ HOSP IP/OBS HIGH 50: CPT | Performed by: INTERNAL MEDICINE

## 2021-05-11 PROCEDURE — RXMED WILLOW AMBULATORY MEDICATION CHARGE: Performed by: HOSPITALIST

## 2021-05-11 PROCEDURE — 85025 COMPLETE CBC W/AUTO DIFF WBC: CPT

## 2021-05-11 PROCEDURE — 99232 SBSQ HOSP IP/OBS MODERATE 35: CPT | Performed by: HOSPITALIST

## 2021-05-11 PROCEDURE — 700111 HCHG RX REV CODE 636 W/ 250 OVERRIDE (IP): Performed by: INTERNAL MEDICINE

## 2021-05-11 PROCEDURE — 700111 HCHG RX REV CODE 636 W/ 250 OVERRIDE (IP): Performed by: STUDENT IN AN ORGANIZED HEALTH CARE EDUCATION/TRAINING PROGRAM

## 2021-05-11 PROCEDURE — 85610 PROTHROMBIN TIME: CPT

## 2021-05-11 PROCEDURE — 770006 HCHG ROOM/CARE - MED/SURG/GYN SEMI*

## 2021-05-11 PROCEDURE — A9270 NON-COVERED ITEM OR SERVICE: HCPCS | Performed by: INTERNAL MEDICINE

## 2021-05-11 PROCEDURE — 80048 BASIC METABOLIC PNL TOTAL CA: CPT

## 2021-05-11 PROCEDURE — 700105 HCHG RX REV CODE 258: Performed by: STUDENT IN AN ORGANIZED HEALTH CARE EDUCATION/TRAINING PROGRAM

## 2021-05-11 RX ORDER — POTASSIUM CHLORIDE 20 MEQ/1
20 TABLET, EXTENDED RELEASE ORAL
Qty: 30 TABLET | Refills: 1 | Status: SHIPPED | OUTPATIENT
Start: 2021-05-11 | End: 2022-07-19

## 2021-05-11 RX ORDER — LOSARTAN POTASSIUM 50 MG/1
50 TABLET ORAL DAILY
Qty: 30 TABLET | Refills: 0 | Status: SHIPPED | OUTPATIENT
Start: 2021-05-12 | End: 2021-09-10 | Stop reason: SDUPTHER

## 2021-05-11 RX ORDER — SERTRALINE HYDROCHLORIDE 100 MG/1
100 TABLET, FILM COATED ORAL DAILY
Qty: 30 TABLET | Refills: 11 | Status: SHIPPED | OUTPATIENT
Start: 2021-05-11 | End: 2021-05-13 | Stop reason: SDUPTHER

## 2021-05-11 RX ORDER — AMOXICILLIN AND CLAVULANATE POTASSIUM 875; 125 MG/1; MG/1
1 TABLET, FILM COATED ORAL 2 TIMES DAILY
Qty: 4 TABLET | Refills: 0 | Status: SHIPPED | OUTPATIENT
Start: 2021-05-11 | End: 2021-05-13

## 2021-05-11 RX ORDER — LOSARTAN POTASSIUM 50 MG/1
50 TABLET ORAL
Status: DISCONTINUED | OUTPATIENT
Start: 2021-05-12 | End: 2021-05-13 | Stop reason: HOSPADM

## 2021-05-11 RX ORDER — LOSARTAN POTASSIUM 25 MG/1
37.5 TABLET ORAL DAILY
Qty: 45 TABLET | Refills: 0 | Status: SHIPPED | OUTPATIENT
Start: 2021-05-11 | End: 2021-05-11

## 2021-05-11 RX ORDER — FUROSEMIDE 20 MG/1
20 TABLET ORAL DAILY
Qty: 30 TABLET | Refills: 0 | Status: SHIPPED | OUTPATIENT
Start: 2021-05-11 | End: 2021-05-13

## 2021-05-11 RX ORDER — FUROSEMIDE 40 MG/1
40 TABLET ORAL
Qty: 30 TABLET | Refills: 2 | Status: SHIPPED | OUTPATIENT
Start: 2021-05-11 | End: 2022-07-19

## 2021-05-11 RX ADMIN — FLECAINIDE ACETATE 100 MG: 100 TABLET ORAL at 04:44

## 2021-05-11 RX ADMIN — DIGOXIN 125 MCG: 125 TABLET ORAL at 04:47

## 2021-05-11 RX ADMIN — AMPICILLIN SODIUM AND SULBACTAM SODIUM 3 G: 2; 1 INJECTION, POWDER, FOR SOLUTION INTRAMUSCULAR; INTRAVENOUS at 04:45

## 2021-05-11 RX ADMIN — LOSARTAN POTASSIUM 37.5 MG: 50 TABLET, FILM COATED ORAL at 05:04

## 2021-05-11 RX ADMIN — LORATADINE 10 MG: 10 TABLET ORAL at 04:44

## 2021-05-11 RX ADMIN — ATORVASTATIN CALCIUM 40 MG: 40 TABLET, FILM COATED ORAL at 17:03

## 2021-05-11 RX ADMIN — METOPROLOL SUCCINATE 100 MG: 50 TABLET, EXTENDED RELEASE ORAL at 04:47

## 2021-05-11 RX ADMIN — AMPICILLIN SODIUM AND SULBACTAM SODIUM 3 G: 2; 1 INJECTION, POWDER, FOR SOLUTION INTRAMUSCULAR; INTRAVENOUS at 23:24

## 2021-05-11 RX ADMIN — AMPICILLIN SODIUM AND SULBACTAM SODIUM 3 G: 2; 1 INJECTION, POWDER, FOR SOLUTION INTRAMUSCULAR; INTRAVENOUS at 17:03

## 2021-05-11 RX ADMIN — FUROSEMIDE 20 MG: 10 INJECTION, SOLUTION INTRAMUSCULAR; INTRAVENOUS at 04:48

## 2021-05-11 RX ADMIN — WARFARIN SODIUM 2.5 MG: 2.5 TABLET ORAL at 17:03

## 2021-05-11 RX ADMIN — SERTRALINE HYDROCHLORIDE 100 MG: 100 TABLET ORAL at 04:44

## 2021-05-11 RX ADMIN — AMPICILLIN SODIUM AND SULBACTAM SODIUM 3 G: 2; 1 INJECTION, POWDER, FOR SOLUTION INTRAMUSCULAR; INTRAVENOUS at 11:19

## 2021-05-11 RX ADMIN — FLECAINIDE ACETATE 100 MG: 100 TABLET ORAL at 17:03

## 2021-05-11 ASSESSMENT — FIBROSIS 4 INDEX: FIB4 SCORE: 1.85

## 2021-05-11 ASSESSMENT — ENCOUNTER SYMPTOMS
SPUTUM PRODUCTION: 1
HEMOPTYSIS: 0
ORTHOPNEA: 0
SINUS PAIN: 0
MYALGIAS: 0
FEVER: 0
WHEEZING: 1
NAUSEA: 0
FOCAL WEAKNESS: 0
BLURRED VISION: 0
COUGH: 1
CHILLS: 0
HEADACHES: 0
EYE PAIN: 0
TREMORS: 0
WEAKNESS: 0
SHORTNESS OF BREATH: 1
VOMITING: 0
DIARRHEA: 0

## 2021-05-11 NOTE — DISCHARGE SUMMARY
Discharge Summary    CHIEF COMPLAINT ON ADMISSION  Chief Complaint   Patient presents with   • Shortness of Breath     Pt is a flight transfer from [facility], was there yesterday and cardioverted out of Afib to a sinus rhythm. Pt went back today for increasing shortness of breath. Sent here for an acute CHF exacerbation. Pt has a wet, congested cough and was given lasix at the prior facility.        Reason for Admission  EMS     Admission Date  5/7/2021    CODE STATUS  DNAR/DNI    HPI & HOSPITAL COURSE  This is a 80 y.o. female past medical history of here A. fib status post cardioversion on 5/6/2021, DM, hypertension, presumed COPD with chronic nocturnal hypoxia on 2 to 3 L who presented with shortness of breath.  She was admitted for acute hypoxic respiratory failure secondary to acute COPD exacerbation and pneumonia.  Started on empiric antibiotics and due to the recurrence of A. fib she was cardioverted on 5/10.  She has been continued on IV Lasix and with all the above measures her shortness of breath has greatly improved.  She continues to require about 2 L of supplemental oxygen however on chart review back in 2018 patient also required 2 L at this time.  I think this may be patient's baseline due to her COPD so home oxygen has been arranged for her.  The night after cardioversion she had a short episode of worsening respiratory failure which improved with Lasix.  Her oxygen requirements have remained stable. I have counseled her on importance of fluid restriction and low sodium diet.      Therefore, she is discharged in good and stable condition to home with close outpatient follow-up.    The patient met 2-midnight criteria for an inpatient stay at the time of discharge.    Discharge Date  5/13/21    FOLLOW UP ITEMS POST DISCHARGE  F/u cardiology  Take Lasix as needed for shortness of breath, swelling, weight gain    DISCHARGE DIAGNOSES  Principal Problem:    Dyspnea POA: Yes  Active Problems:    HTN  (hypertension) POA: Yes    Dyslipidemia, goal LDL below 100 POA: Yes    Former tobacco use POA: Unknown    Advance care planning POA: Unknown      Overview: IMO load March 2020    Atrial fibrillation (CMS-HCC) [I48.91] POA: Yes    Vitamin D deficiency POA: Yes    Aspiration pneumonia (HCC) POA: Unknown    Chronic obstructive pulmonary disease with acute exacerbation (HCC) POA: Unknown    Acute on chronic respiratory failure with hypoxia (HCC) POA: Unknown    Leukocytosis POA: Unknown    Sepsis (HCC) POA: Unknown    Age-related physical debility POA: Unknown    Acute pulmonary edema (HCC) POA: Unknown    Mood disorder (HCC) POA: Unknown    Hyperglycemia POA: Unknown  Resolved Problems:    * No resolved hospital problems. *      FOLLOW UP  Future Appointments   Date Time Provider Department Center   5/17/2021 10:40 AM Emily Thornton P.A.-C. [facility] None   6/8/2021 11:15 AM BECKIE Noriega [facility] None     No follow-up provider specified.    MEDICATIONS ON DISCHARGE     Medication List      START taking these medications      Instructions   furosemide 40 MG Tabs  Commonly known as: LASIX   Take 1 tablet by mouth 1 time a day as needed (weight gain, swelling, shortness of breath).  Dose: 40 mg     potassium chloride SA 20 MEQ Tbcr  Commonly known as: Kdur   Take 1 tablet by mouth 1 time a day as needed (take with lasix only).  Dose: 20 mEq     predniSONE 20 MG Tabs  Commonly known as: DELTASONE   Take 2 Tablets by mouth every day for 3 days.  Dose: 40 mg        CHANGE how you take these medications      Instructions   atorvastatin 40 MG Tabs  What changed: when to take this  Commonly known as: LIPITOR   Take 1 tablet by mouth once daily     losartan 50 MG Tabs  What changed:   · medication strength  · how much to take  Commonly known as: COZAAR   Take 1 tablet by mouth every day.  Dose: 50 mg     metoprolol  MG Tb24  What changed: when to take this  Commonly known as: TOPROL XL   Doctor's comments: Needs to  be seen for further refills thank you  Take 1 tablet by mouth once daily     sertraline 100 MG Tabs  What changed:   · how much to take  · additional instructions  Commonly known as: Zoloft   Take 1 tablet by mouth every day.  Dose: 100 mg     warfarin 5 MG Tabs  What changed: See the new instructions.  Commonly known as: COUMADIN   Doctor's comments: This rx was submitted by a pharmacist working under a collaborative practice agreement.  TAKE 1 TO 1 & 1/2 (ONE TO ONE & ONE-HALF) TABLETS BY MOUTH ONCE DAILY        CONTINUE taking these medications      Instructions   albuterol 108 (90 Base) MCG/ACT Aers inhalation aerosol  Commonly known as: Ventolin HFA   Inhale 2 Puffs every 6 hours as needed for Shortness of Breath.  Dose: 2 Puff     BENADRYL PO   Take 1 tablet by mouth at bedtime as needed (sleep).  Dose: 1 tablet     Biotin 1000 MCG Tabs   Take 1,000 mcg by mouth every day.  Dose: 1,000 mcg     digoxin 125 MCG Tabs  Commonly known as: LANOXIN   Take 1 Tab by mouth every day.  Dose: 125 mcg     flecainide 100 MG Tabs  Commonly known as: TAMBOCOR   Take 1 tablet by mouth 2 times a day.  Dose: 100 mg     fluticasone 50 MCG/ACT nasal spray  Commonly known as: FLONASE   Administer 1 Spray into affected nostril(S) 1 time a day as needed (allergys).  Dose: 1 Spray     loratadine 10 MG Tabs  Commonly known as: CLARITIN   Take 10 mg by mouth every day.  Dose: 10 mg     PreserVision AREDS 2 Caps   Take 1 capsule by mouth every day.  Dose: 1 capsule     TYLENOL PO   Take 1 tablet by mouth 1 time a day as needed (pain).  Dose: 1 tablet     Vitamin B-12 5000 MCG Tbdp   Take 5,000 Units by mouth every day.  Dose: 5,000 Units     Vitamin D-3 125 MCG (5000 UT) Tabs   Take 5,000 Units by mouth every day.  Dose: 5,000 Units        STOP taking these medications    amoxicillin 500 MG Caps  Commonly known as: AMOXIL            Allergies  Allergies   Allergen Reactions   • Feldene [Piroxicam] Photosensitivity   • Sulfa Drugs Hives    • Codeine Anxiety       DIET  Orders Placed This Encounter   Procedures   • Diet Order Diet: Cardiac     Standing Status:   Standing     Number of Occurrences:   1     Order Specific Question:   Diet:     Answer:   Cardiac [6]       ACTIVITY  As tolerated.  Weight bearing as tolerated    CONSULTATIONS  Cardiology    PROCEDURES  MARIAN with cardioversion    LABORATORY  Lab Results   Component Value Date    SODIUM 141 05/11/2021    POTASSIUM 4.0 05/11/2021    CHLORIDE 99 05/11/2021    CO2 32 05/11/2021    GLUCOSE 130 (H) 05/11/2021    BUN 31 (H) 05/11/2021    CREATININE 0.95 05/11/2021    CREATININE 0.8 12/11/2007        Lab Results   Component Value Date    WBC 12.7 (H) 05/11/2021    HEMOGLOBIN 13.2 05/11/2021    HEMATOCRIT 41.3 05/11/2021    PLATELETCT 281 05/11/2021        Total time of the discharge process exceeds 36 minutes.

## 2021-05-11 NOTE — PROGRESS NOTES
Handoff report received from day shift RN. Assumed pt care. Pt not in distress. Pt AOx 4, on RA with 2L NC PM. Tele box on, rhythm verified. Safety precautions in place. Call light and personal belongings within reach. Educated to call for assistance if needed.

## 2021-05-11 NOTE — HEART FAILURE PROGRAM
Cardiovascular Nurse Navigator () Advanced Heart Failure Program Inpatient Progress Note:    Per Dr. Garcia's cosign on 5/10/21, patient does not appear to be in acute heart failure at this time. Therefore, a seven day HF f/u appt and heart failure education are not indicated.    Thank you, Aster Cardio RN Navigator [id number]

## 2021-05-11 NOTE — DISCHARGE PLANNING
Spoke To: Michael  Agency/Facility Name: [facility]  Plan or Request: Michael returned phone call, said he didn't get fax because paper ran out of fax machine. He also stated he can not bring 02 to the pt and [facility] [employer] [facility] does not have a sister company in [facility].    1520- DPA called back and left a vm for Michael asking him to call pt's  Benton, to make sure he has enough 02 to bring to the hospital for a ride back home.

## 2021-05-11 NOTE — PROGRESS NOTES
Room air oxygen saturation: 88 % At rest  Oxygen: 2 LPM saturation 92-95% at rest  Oxygen saturation at 2L NC during ambulation 91%

## 2021-05-11 NOTE — CARE PLAN
Problem: Infection  Goal: Will remain free from infection  Outcome: PROGRESSING AS EXPECTED  Note: Standard precautions in place. Oral care completed. IV ABX     Problem: Knowledge Deficit  Goal: Knowledge of disease process/condition, treatment plan, diagnostic tests, and medications will improve  Outcome: PROGRESSING AS EXPECTED  Note: Updated on current POC, patient verbalized no further questions. Educated patient we are monitoring s/p cardioversion

## 2021-05-11 NOTE — FACE TO FACE
"Face to Face Note  -  Durable Medical Equipment    Rosa Rascon M.D. - NPI: [id number]  I certify that this patient is under my care and that they had a durable medical equipment(DME)face to face encounter by myself that meets the physician DME face-to-face encounter requirements with this patient on:    Date of encounter:   Patient:                    MRN:                       YOB: 2022  Laurie Adams  [id number]  1940     The encounter with the patient was in whole, or in part, for the following medical condition, which is the primary reason for durable medical equipment:  COPD    I certify that, based on my findings, the following durable medical equipment is medically necessary:  Oxygen.    HOME O2 Saturation Measurements:(Values must be present for Home Oxygen orders)  Room air sat at rest: 88  Room air sat with amb: 85  With liters of O2: 2, O2 sat at rest with O2: 95  With Liters of O2: 2, O2 sat with amb with O2 : 91  Is the patient mobile?: Yes    My Clinical findings support the need for the above equipment due to:  Hypoxia    Supporting Symptoms: The patient requires supplemental oxygen, as the following interventions have been tried with limited or no improvement: \"Positive expiratory pressure therapies, \"Ambulation with oximetry and \"Incentive spirometry    If patient feels more short of breath, they can go up to 6 liters per minute and contact healthcare provider.  "

## 2021-05-11 NOTE — DISCHARGE PLANNING
Anticipated Discharge Disposition: Home with O2    Action: RN CM attempted to follow up with [facility], left voicemail.  RN CM met with patient and her  Benton at bedside.  VIRA COBURN explained that we likely won't get any portable oxygen delivered from [facility] in a timely manner.  Patient and her  stated that they could just drive home since there in an O2 concentrator already home home.  Patient explained that she thinks she would be ok without the O2 for the ride home.  VIRA COBURN updated Dr. Rascon and Mee CONSTANTINO over Voalte.    Barriers to Discharge: Portable O2 (no barrier if the patient opts to go home without portable O2).    Plan: Case coordination to continue to follow up with medical team to discuss discharge barriers.

## 2021-05-11 NOTE — DISCHARGE PLANNING
Received Choice form at 1235  Agency/Facility Name: [facility]  Referral sent per Choice form at 1240 (manually)    1345- DPA called [facility], left vm regarding referral.

## 2021-05-11 NOTE — DISCHARGE PLANNING
Anticipated Discharge Disposition: Home with O2    Action: RN CM called [facility] who confirmed that the patient is on their service for home O2, they provided their fax number at [phone].  RN CM met with patient at bedside to discuss DME choice, patient provided choice for [facility].  Choice form faxed to ANA Bergman.    Barriers to Discharge: Home O2 set up     Plan: Case coordination to continue to follow up with medical team to discuss discharge barriers.

## 2021-05-11 NOTE — DISCHARGE PLANNING
Anticipated Discharge Disposition: Home    Action: RN CM spoke with patient at bedside to deliver 2nd IMM.  Patient signed 2nd IMM at 1010.  RN CM noticed that patient was on 3L of O2.  Patient explained that she normally only uses O2 at night, and she could not recall the name of the DME company that she uses.  Patient provided her physical address:    [phone] [place] [address]    Barriers to Discharge: Medical clearance     Plan: Case coordination to continue to follow up with medical team to discuss discharge barriers.

## 2021-05-11 NOTE — PROGRESS NOTES
Inpatient Anticoagulation Service Note    Date: 5/11/2021  Reason for Anticoagulation: Atrial Fibrillation   [id number] VASc Score: 5  HAS-BLED Score: 2    Hemoglobin Value: 13.2  Hematocrit Value: 41.3  Lab Platelet Value: 281  Target INR: 2.0 to 3.0    INR from last 7 days     Date/Time INR Value    05/10/21 2306  (!) 2.48    05/10/21 0250  (!) 2.32    05/09/21 1339  (!) 2.15    05/07/21 2214  (!) 2.01        Dose from last 7 days     Date/Time Dose (mg)    05/11/21 1640  2.5    05/10/21 1341  5    05/10/21 1338  2.5    05/09/21 1200  --    05/08/21 0407  5        Average Dose (mg): 5  Significant Interactions: Statin, Antibiotics  Bridge Therapy: No    Reversal Agent Administered: Not Applicable  Comments:    Plan:  Continue home dosing - 2.5mg on Tuesday (today) and 5mg all other days. H/h stable, no concerns for bleeding.    Education Material Provided?: No(on chronic therapy at home)  Pharmacist suggested discharge dosing: Continue home dosing - 2.5mg on Tuesday (today) and 5mg all other days - follow up INR within 4-5 days of discharge.     Chaz Rios, MarinD

## 2021-05-11 NOTE — PROGRESS NOTES
Hospital Medicine Daily Progress Note    Date of Service  5/11/2021    Chief Complaint  80 y.o. female admitted 5/7/2021 with     Hospital Course    This is a 80 y.o. female past medical history of here JEANNA. fib status post cardioversion on 5/6/2021, DM, hypertension, presumed COPD with chronic nocturnal hypoxia on 2 to 3 L who presented with shortness of breath.  She was admitted for acute hypoxic respiratory failure secondary to acute COPD exacerbation and pneumonia.  Started on empiric antibiotics and due to the recurrence of A. fib she was cardioverted on 5/10.  She has been continued on IV Lasix and with all the above measures her shortness of breath has greatly improved.  She continues to require about 2 L of supplemental oxygen however on chart review back in 2018 patient also required 2 L at this time.  I think this may be patient's baseline due to her COPD.     Interval Problem Update  No acute events overnight  She is medically cleared for discharge however unable to arrange portable oxygen for her to get home    Consultants/Specialty  Cardiology    Code Status  DNAR/DNI    Disposition  Medically cleared    Review of Systems  Review of Systems   Constitutional: Negative for chills and fever.   HENT: Negative for ear pain and sinus pain.    Eyes: Negative for blurred vision and pain.   Respiratory: Positive for cough, sputum production, shortness of breath and wheezing. Negative for hemoptysis.    Cardiovascular: Negative for chest pain and orthopnea.   Gastrointestinal: Negative for diarrhea, nausea and vomiting.   Genitourinary: Negative for dysuria and hematuria.   Musculoskeletal: Negative for myalgias.   Skin: Negative for itching.   Neurological: Negative for tremors, focal weakness, weakness and headaches.   Psychiatric/Behavioral: Negative for suicidal ideas.        Physical Exam  Temp:  [36.2 °C (97.2 °F)-36.8 °C (98.3 °F)] 36.2 °C (97.2 °F)  Pulse:  [63-80] 67  Resp:  [15-18] 15  BP:  (110-159)/(64-80) 133/73  SpO2:  [89 %-96 %] 94 %    Physical Exam  Vitals and nursing note reviewed.   Constitutional:       Appearance: Normal appearance.   Cardiovascular:      Rate and Rhythm: Normal rate and regular rhythm.   Pulmonary:      Effort: Pulmonary effort is normal.      Breath sounds: Normal breath sounds.   Skin:     General: Skin is warm and dry.   Neurological:      General: No focal deficit present.      Mental Status: She is alert and oriented to person, place, and time.         Fluids     Intake/Output Summary (Last 24 hours) at 5/11/2021 1422  Last data filed at 5/10/2021 1942  Gross per 24 hour   Intake --   Output 150 ml   Net -150 ml       Laboratory  Recent Labs     05/09/21  0209 05/10/21  0250 05/11/21  0431   WBC 13.9* 17.2* 12.7*   RBC 4.61 4.72 4.41   HEMOGLOBIN 14.0 13.7 13.2   HEMATOCRIT 41.1 43.6 41.3   MCV 89.2 92.4 93.7   MCH 30.4 29.0 29.9   MCHC 34.1 31.4* 32.0*   RDW 44.1 46.8 47.6   PLATELETCT 233 281 281   MPV 11.8 12.1 11.6     Recent Labs     05/09/21  0209 05/10/21  0250 05/11/21  0431   SODIUM 135 140 141   POTASSIUM 4.0 3.9 4.0   CHLORIDE 98 99 99   CO2 23 31 32   GLUCOSE 184* 131* 130*   BUN 24* 26* 31*   CREATININE 0.98 0.98 0.95   CALCIUM 8.4* 8.8 8.7     Recent Labs     05/09/21  1339 05/10/21  0250 05/10/21  2306   INR 2.15* 2.32* 2.48*               Imaging  EC-ECHOCARDIOGRAM COMPLETE W/O CONT   Final Result      CT-CTA CHEST PULMONARY ARTERY W/ RECONS   Final Result         1. No CT evidence of pulmonary embolism.      .2. Interlobular septal thickening and hazy groundglass opacity throughout both lungs, most in the dependent portion of the right upper lobe. This could relate to moderate pulmonary edema.      3. Multiple moderate bilateral pleural effusions      4. Enlarged left atrium.      DX-CHEST-PORTABLE (1 VIEW)   Final Result         1. No acute cardiopulmonary abnormalities are identified.      OUTSIDE IMAGES-DX CHEST   Final Result      CL-CARDIOVERSION     (Results Pending)   CL-CARDIOVERSION    (Results Pending)        Assessment/Plan  * Dyspnea- (present on admission)  Assessment & Plan  Multifactorial -- likely in the setting of pulmonary edema and  COPD exacerbation  Abx  Diuresis  Nebs treatment, steroid      Aspiration pneumonia (HCC)  Assessment & Plan  SOB, productive sputum  Right sided infiltrate, b/l pleural effusions 24hours after CVN  WBC 16.6k, LA 2.8    F/u sputum Cx, BCx, ngtd, PNA serology, procal   Aspiration precautions, speech eval cleared patient   Abx: Unasyn + azithromycin    Atrial fibrillation (CMS-HCC) [I48.91]- (present on admission)  Assessment & Plan  S/p DCCVN 5/6/2021, underwent cardioversion on 5/10/21  Currently in sinus rhtyhm    OAC: warfarin -- dosing and INR monitoring as per pharmacy  Cont Toprolol XL, Digoxin, flecainide  Repeat echo as per cardiology      Acute pulmonary edema (HCC)  Assessment & Plan  Acute b/l moderate pulmonary edema  Diuresis as tolerated  F/u echo  Cardiology following    Acute on chronic respiratory failure with hypoxia (HCC)  Assessment & Plan  Dependently on nightly 2-3L oxygen  Currently at baseline with 2-3L -- cont monitoring    Chronic obstructive pulmonary disease with acute exacerbation (HCC)  Assessment & Plan  Mild acute exacerbation  Solumederol 40mg q8 x9 doses ordered, can DC on medrol pack  Abx as noted in PNA  Nebs, aggressive pulm toilet  Avoid over-oxygenation, target sat 88-94%    Hyperglycemia  Assessment & Plan  ISS as she is on Solumedrol  Repeat HbA1c    Mood disorder (HCC)  Assessment & Plan  zoloft    Age-related physical debility  Assessment & Plan  PT/OT  Fall precautions    Sepsis (HCC)  Assessment & Plan  This is Sepsis Present on admission  SIRS criteria identified on my evaluation include: Tachycardia, with heart rate greater than 90 BPM, Leukocytosis, with WBC greater than 12,000 and Bandemia, greater than 10% bands  Source is pulm  Suspected onset of infection (date and  time) 5/8/2021  Sepsis protocol initiated  Fluid resuscitation ordered per protocol  IV antibiotics as appropriate for source of sepsis  While organ dysfunction may be noted elsewhere in this problem list or in the chart, degree of organ dysfunction does not meet CMS criteria for severe sepsis            Leukocytosis  Assessment & Plan  Likely secondary to PNA/sepsis  Secondary to steroids    Advance care planning  Assessment & Plan  Discussed plan of care for this admission with patient  DNR/DNI status confirmed - no CPR, defibrillation, intuabtion  AAOx4 at time of evaluation  Advance care planning: 15mins    Former tobacco use  Assessment & Plan  40-pack year smoking previously    Dyslipidemia, goal LDL below 100- (present on admission)  Assessment & Plan  statin    HTN (hypertension)- (present on admission)  Assessment & Plan  Losartan, metoprolol, digoxin       VTE prophylaxis: Warfarin

## 2021-05-12 ENCOUNTER — APPOINTMENT (OUTPATIENT)
Dept: RADIOLOGY | Facility: MEDICAL CENTER | Age: 81
DRG: 871 | End: 2021-05-12
Attending: STUDENT IN AN ORGANIZED HEALTH CARE EDUCATION/TRAINING PROGRAM
Payer: MEDICARE

## 2021-05-12 ENCOUNTER — PHARMACY VISIT (OUTPATIENT)
Dept: PHARMACY | Facility: MEDICAL CENTER | Age: 81
End: 2021-05-12
Payer: COMMERCIAL

## 2021-05-12 LAB
GLUCOSE BLD-MCNC: 141 MG/DL (ref 65–99)
GLUCOSE BLD-MCNC: 151 MG/DL (ref 65–99)
GLUCOSE BLD-MCNC: 154 MG/DL (ref 65–99)
GLUCOSE BLD-MCNC: 158 MG/DL (ref 65–99)
INR PPP: 2.69 (ref 0.87–1.13)
PROCALCITONIN SERPL-MCNC: <0.05 NG/ML
PROTHROMBIN TIME: 29.4 SEC (ref 12–14.6)

## 2021-05-12 PROCEDURE — 94640 AIRWAY INHALATION TREATMENT: CPT

## 2021-05-12 PROCEDURE — A9270 NON-COVERED ITEM OR SERVICE: HCPCS | Performed by: STUDENT IN AN ORGANIZED HEALTH CARE EDUCATION/TRAINING PROGRAM

## 2021-05-12 PROCEDURE — A9270 NON-COVERED ITEM OR SERVICE: HCPCS | Performed by: PHYSICIAN ASSISTANT

## 2021-05-12 PROCEDURE — 700101 HCHG RX REV CODE 250: Performed by: STUDENT IN AN ORGANIZED HEALTH CARE EDUCATION/TRAINING PROGRAM

## 2021-05-12 PROCEDURE — 700105 HCHG RX REV CODE 258: Performed by: STUDENT IN AN ORGANIZED HEALTH CARE EDUCATION/TRAINING PROGRAM

## 2021-05-12 PROCEDURE — 770020 HCHG ROOM/CARE - TELE (206)

## 2021-05-12 PROCEDURE — 99233 SBSQ HOSP IP/OBS HIGH 50: CPT | Performed by: HOSPITALIST

## 2021-05-12 PROCEDURE — 700102 HCHG RX REV CODE 250 W/ 637 OVERRIDE(OP): Performed by: STUDENT IN AN ORGANIZED HEALTH CARE EDUCATION/TRAINING PROGRAM

## 2021-05-12 PROCEDURE — 82962 GLUCOSE BLOOD TEST: CPT | Mod: 91

## 2021-05-12 PROCEDURE — 700111 HCHG RX REV CODE 636 W/ 250 OVERRIDE (IP): Performed by: STUDENT IN AN ORGANIZED HEALTH CARE EDUCATION/TRAINING PROGRAM

## 2021-05-12 PROCEDURE — 99291 CRITICAL CARE FIRST HOUR: CPT | Performed by: STUDENT IN AN ORGANIZED HEALTH CARE EDUCATION/TRAINING PROGRAM

## 2021-05-12 PROCEDURE — 84145 PROCALCITONIN (PCT): CPT

## 2021-05-12 PROCEDURE — 71045 X-RAY EXAM CHEST 1 VIEW: CPT

## 2021-05-12 PROCEDURE — 36415 COLL VENOUS BLD VENIPUNCTURE: CPT

## 2021-05-12 PROCEDURE — 700111 HCHG RX REV CODE 636 W/ 250 OVERRIDE (IP)

## 2021-05-12 PROCEDURE — 700102 HCHG RX REV CODE 250 W/ 637 OVERRIDE(OP): Performed by: PHYSICIAN ASSISTANT

## 2021-05-12 RX ORDER — FUROSEMIDE 10 MG/ML
40 INJECTION INTRAMUSCULAR; INTRAVENOUS
Status: DISCONTINUED | OUTPATIENT
Start: 2021-05-12 | End: 2021-05-13 | Stop reason: HOSPADM

## 2021-05-12 RX ORDER — FUROSEMIDE 10 MG/ML
INJECTION INTRAMUSCULAR; INTRAVENOUS
Status: ACTIVE
Start: 2021-05-12 | End: 2021-05-12

## 2021-05-12 RX ORDER — FUROSEMIDE 10 MG/ML
40 INJECTION INTRAMUSCULAR; INTRAVENOUS
Status: DISCONTINUED | OUTPATIENT
Start: 2021-05-13 | End: 2021-05-12

## 2021-05-12 RX ORDER — METHYLPREDNISOLONE SODIUM SUCCINATE 40 MG/ML
40 INJECTION, POWDER, LYOPHILIZED, FOR SOLUTION INTRAMUSCULAR; INTRAVENOUS EVERY 8 HOURS
Status: COMPLETED | OUTPATIENT
Start: 2021-05-12 | End: 2021-05-12

## 2021-05-12 RX ORDER — METHYLPREDNISOLONE SODIUM SUCCINATE 40 MG/ML
INJECTION, POWDER, LYOPHILIZED, FOR SOLUTION INTRAMUSCULAR; INTRAVENOUS
Status: COMPLETED
Start: 2021-05-12 | End: 2021-05-12

## 2021-05-12 RX ORDER — LOSARTAN POTASSIUM 50 MG/1
TABLET ORAL
Status: COMPLETED
Start: 2021-05-12 | End: 2021-05-13

## 2021-05-12 RX ADMIN — DIGOXIN 125 MCG: 125 TABLET ORAL at 06:23

## 2021-05-12 RX ADMIN — LORATADINE 10 MG: 10 TABLET ORAL at 06:22

## 2021-05-12 RX ADMIN — AMPICILLIN SODIUM AND SULBACTAM SODIUM 3 G: 2; 1 INJECTION, POWDER, FOR SOLUTION INTRAMUSCULAR; INTRAVENOUS at 23:19

## 2021-05-12 RX ADMIN — METHYLPREDNISOLONE SODIUM SUCCINATE 40 MG: 40 INJECTION, POWDER, FOR SOLUTION INTRAMUSCULAR; INTRAVENOUS at 23:18

## 2021-05-12 RX ADMIN — INSULIN HUMAN 1 UNITS: 100 INJECTION, SOLUTION PARENTERAL at 20:12

## 2021-05-12 RX ADMIN — WARFARIN SODIUM 5 MG: 5 TABLET ORAL at 17:33

## 2021-05-12 RX ADMIN — AMPICILLIN SODIUM AND SULBACTAM SODIUM 3 G: 2; 1 INJECTION, POWDER, FOR SOLUTION INTRAMUSCULAR; INTRAVENOUS at 17:33

## 2021-05-12 RX ADMIN — AMPICILLIN SODIUM AND SULBACTAM SODIUM 3 G: 2; 1 INJECTION, POWDER, FOR SOLUTION INTRAMUSCULAR; INTRAVENOUS at 11:50

## 2021-05-12 RX ADMIN — LEVALBUTEROL 1.25 MG: 1.25 SOLUTION RESPIRATORY (INHALATION) at 02:30

## 2021-05-12 RX ADMIN — AMPICILLIN SODIUM AND SULBACTAM SODIUM 3 G: 2; 1 INJECTION, POWDER, FOR SOLUTION INTRAMUSCULAR; INTRAVENOUS at 06:23

## 2021-05-12 RX ADMIN — FUROSEMIDE 40 MG: 10 INJECTION, SOLUTION INTRAMUSCULAR; INTRAVENOUS at 15:16

## 2021-05-12 RX ADMIN — ATORVASTATIN CALCIUM 40 MG: 40 TABLET, FILM COATED ORAL at 17:32

## 2021-05-12 RX ADMIN — METHYLPREDNISOLONE SODIUM SUCCINATE 40 MG: 40 INJECTION, POWDER, FOR SOLUTION INTRAMUSCULAR; INTRAVENOUS at 14:00

## 2021-05-12 RX ADMIN — INSULIN HUMAN 1 UNITS: 100 INJECTION, SOLUTION PARENTERAL at 11:55

## 2021-05-12 RX ADMIN — METHYLPREDNISOLONE SODIUM SUCCINATE 40 MG: 40 INJECTION, POWDER, FOR SOLUTION INTRAMUSCULAR; INTRAVENOUS at 07:32

## 2021-05-12 RX ADMIN — FLECAINIDE ACETATE 100 MG: 100 TABLET ORAL at 06:22

## 2021-05-12 RX ADMIN — LOSARTAN POTASSIUM 50 MG: 50 TABLET, FILM COATED ORAL at 06:22

## 2021-05-12 RX ADMIN — LABETALOL HYDROCHLORIDE 10 MG: 5 INJECTION, SOLUTION INTRAVENOUS at 02:30

## 2021-05-12 RX ADMIN — FLECAINIDE ACETATE 100 MG: 100 TABLET ORAL at 17:33

## 2021-05-12 RX ADMIN — METHYLPREDNISOLONE SODIUM SUCCINATE 40 MG: 40 INJECTION, POWDER, FOR SOLUTION INTRAMUSCULAR; INTRAVENOUS at 03:30

## 2021-05-12 RX ADMIN — SERTRALINE HYDROCHLORIDE 100 MG: 100 TABLET ORAL at 06:22

## 2021-05-12 RX ADMIN — INSULIN HUMAN 1 UNITS: 100 INJECTION, SOLUTION PARENTERAL at 08:06

## 2021-05-12 RX ADMIN — METOPROLOL SUCCINATE 100 MG: 50 TABLET, EXTENDED RELEASE ORAL at 06:22

## 2021-05-12 ASSESSMENT — COGNITIVE AND FUNCTIONAL STATUS - GENERAL
DAILY ACTIVITIY SCORE: 18
DRESSING REGULAR UPPER BODY CLOTHING: A LITTLE
HELP NEEDED FOR BATHING: A LITTLE
DRESSING REGULAR LOWER BODY CLOTHING: A LITTLE
MOVING TO AND FROM BED TO CHAIR: A LITTLE
EATING MEALS: A LITTLE
TOILETING: A LITTLE
TURNING FROM BACK TO SIDE WHILE IN FLAT BAD: A LITTLE
CLIMB 3 TO 5 STEPS WITH RAILING: A LITTLE
SUGGESTED CMS G CODE MODIFIER DAILY ACTIVITY: CK
MOBILITY SCORE: 18
PERSONAL GROOMING: A LITTLE
MOVING FROM LYING ON BACK TO SITTING ON SIDE OF FLAT BED: A LITTLE
SUGGESTED CMS G CODE MODIFIER MOBILITY: CK
WALKING IN HOSPITAL ROOM: A LITTLE
STANDING UP FROM CHAIR USING ARMS: A LITTLE

## 2021-05-12 ASSESSMENT — ENCOUNTER SYMPTOMS
SHORTNESS OF BREATH: 0
NERVOUS/ANXIOUS: 1
CHILLS: 0
PALPITATIONS: 0
DIZZINESS: 0
HEADACHES: 0
WHEEZING: 0
FEVER: 0
COUGH: 0
NAUSEA: 0
VOMITING: 0
ABDOMINAL PAIN: 0

## 2021-05-12 ASSESSMENT — FIBROSIS 4 INDEX: FIB4 SCORE: 1.85

## 2021-05-12 NOTE — DISCHARGE PLANNING
Meds-to-Beds: Discharge prescription orders listed below delivered to patient's bedside. RN Mireya notified. Patient counseled. Patient elected to have co-payment billed to patient account.      Laurie Adams   Home Medication Instructions ALAINA:[id number]    Printed on:05/12/21 3698   Medication Information                      amoxicillin-clavulanate (AUGMENTIN) 875-125 MG Tab  Take 1 tablet by mouth 2 times a day for 2 days.                 Hillary Coley, PharmD

## 2021-05-12 NOTE — PROGRESS NOTES
Hospital Medicine Daily Progress Note    Date of Service  5/12/2021    Chief Complaint  80 y.o. female admitted 5/7/2021 with     Hospital Course    This is a 80 y.o. female past medical history of here JEANNA. fib status post cardioversion on 5/6/2021, DM, hypertension, presumed COPD with chronic nocturnal hypoxia on 2 to 3 L who presented with shortness of breath.  She was admitted for acute hypoxic respiratory failure secondary to acute COPD exacerbation and pneumonia.  Started on empiric antibiotics and due to the recurrence of A. fib she was cardioverted on 5/10.  She has been continued on IV Lasix and with all the above measures her shortness of breath has greatly improved.  She continues to require about 2 L of supplemental oxygen however on chart review back in 2018 patient also required 2 L at this time.  I think this may be patient's baseline due to her COPD.     Interval Problem Update  Worsening hypoxia overnight requiring up to 8 L of supplemental oxygen.  Rapid response was called and patient given nebulizing treatment and IV Lasix and patient returned back to 2 L shortly after  Her shortness of breath this morning is improved however she still feels fairly anxious      Consultants/Specialty  Cardiology    Code Status  DNAR/DNI    Disposition  Dissipate discharge home tomorrow    Review of Systems  Review of Systems   Constitutional: Negative for chills and fever.   Respiratory: Negative for cough, shortness of breath and wheezing.    Cardiovascular: Negative for chest pain and palpitations.   Gastrointestinal: Negative for abdominal pain, nausea and vomiting.   Genitourinary: Negative for dysuria and urgency.   Neurological: Negative for dizziness and headaches.   Psychiatric/Behavioral: The patient is nervous/anxious.    All other systems reviewed and are negative.       Physical Exam  Temp:  [36.1 °C (96.9 °F)-37 °C (98.6 °F)] 36.1 °C (96.9 °F)  Pulse:  [66-94] 94  Resp:  [15-24] 17  BP:  (130-185)/(56-98) 135/72  SpO2:  [92 %-96 %] 92 %    Physical Exam  Vitals and nursing note reviewed.   Constitutional:       Appearance: Normal appearance.   Cardiovascular:      Rate and Rhythm: Normal rate and regular rhythm.   Pulmonary:      Effort: Pulmonary effort is normal. No respiratory distress.      Breath sounds: Wheezing (scattered) present.   Skin:     General: Skin is warm and dry.   Neurological:      General: No focal deficit present.      Mental Status: She is alert and oriented to person, place, and time.         Fluids     Intake/Output Summary (Last 24 hours) at 5/12/2021 1301  Last data filed at 5/12/2021 1000  Gross per 24 hour   Intake 600 ml   Output --   Net 600 ml       Laboratory  Recent Labs     05/10/21  0250 05/11/21  0431   WBC 17.2* 12.7*   RBC 4.72 4.41   HEMOGLOBIN 13.7 13.2   HEMATOCRIT 43.6 41.3   MCV 92.4 93.7   MCH 29.0 29.9   MCHC 31.4* 32.0*   RDW 46.8 47.6   PLATELETCT 281 281   MPV 12.1 11.6     Recent Labs     05/10/21  0250 05/11/21  0431   SODIUM 140 141   POTASSIUM 3.9 4.0   CHLORIDE 99 99   CO2 31 32   GLUCOSE 131* 130*   BUN 26* 31*   CREATININE 0.98 0.95   CALCIUM 8.8 8.7     Recent Labs     05/09/21  1339 05/10/21  0250 05/10/21  2306   INR 2.15* 2.32* 2.48*               Imaging  DX-CHEST-PORTABLE (1 VIEW)   Preliminary Result         1.  Pulmonary edema and/or infiltrates are identified, which appear somewhat increased since the prior exam.   2.  Cardiomegaly   3.  Atherosclerosis      EC-ECHOCARDIOGRAM COMPLETE W/O CONT   Final Result      CT-CTA CHEST PULMONARY ARTERY W/ RECONS   Final Result         1. No CT evidence of pulmonary embolism.      .2. Interlobular septal thickening and hazy groundglass opacity throughout both lungs, most in the dependent portion of the right upper lobe. This could relate to moderate pulmonary edema.      3. Multiple moderate bilateral pleural effusions      4. Enlarged left atrium.      DX-CHEST-PORTABLE (1 VIEW)   Final Result          1. No acute cardiopulmonary abnormalities are identified.      OUTSIDE IMAGES-DX CHEST   Final Result      CL-CARDIOVERSION    (Results Pending)        Assessment/Plan  * Dyspnea- (present on admission)  Assessment & Plan  Multifactorial -- likely in the setting of pulmonary edema and  COPD exacerbation  Abx  Diuresis  Nebs treatment, steroid      Hyperglycemia  Assessment & Plan  ISS as she is on Solumedrol  Repeat HbA1c    Mood disorder (HCC)  Assessment & Plan  zoloft    Acute pulmonary edema (HCC)  Assessment & Plan  Acute b/l moderate pulmonary edema  Diuresis as tolerated  F/u echo  Cardiology following    Age-related physical debility  Assessment & Plan  PT/OT  Fall precautions    Sepsis ([facility])  Assessment & Plan  This is Sepsis Present on admission  SIRS criteria identified on my evaluation include: Tachycardia, with heart rate greater than 90 BPM, Leukocytosis, with WBC greater than 12,000 and Bandemia, greater than 10% bands  Source is pulm  Suspected onset of infection (date and time) 5/8/2021  Sepsis protocol initiated  Fluid resuscitation ordered per protocol  IV antibiotics as appropriate for source of sepsis  While organ dysfunction may be noted elsewhere in this problem list or in the chart, degree of organ dysfunction does not meet CMS criteria for severe sepsis            Leukocytosis  Assessment & Plan  Likely secondary to PNA/sepsis  Secondary to steroids    Acute on chronic respiratory failure with hypoxia ([facility])  Assessment & Plan  Dependently on nightly 2-3L oxygen  Had acute worsening overnight requiring up to 8 L of supplemental oxygen which improved with IV Lasix  Home oxygen has been arranged for her.  If her oxygen requirements remain stable she will likely discharge tomorrow sign continue continue IV Lasix twice daily and IV Solu-Medrol    Chronic obstructive pulmonary disease with acute exacerbation (HCC)  Assessment & Plan  Mild acute exacerbation  Solumederol 40mg q8 x9 doses  ordered, can DC on medrol pack  Abx as noted in PNA  Nebs, aggressive pulm toilet  Avoid over-oxygenation, target sat 88-94%    Aspiration pneumonia (HCC)  Assessment & Plan  Right sided infiltrate, b/l pleural effusions 24hours after CVN  F/u sputum Cx, BCx, ngtd, PNA serology, procal   Aspiration precautions, speech eval cleared patient   Abx: Unasyn + azithromycin    Atrial fibrillation (CMS-HCC) [I48.91]- (present on admission)  Assessment & Plan  S/p DCCVN 5/6/2021, underwent cardioversion on 5/10/21  Currently in sinus [facility]    OAC: warfarin -- dosing and INR monitoring as per pharmacy  Cont Toprolol XL, Digoxin, flecainide  Repeat echo as per cardiology      Advance care planning  Assessment & Plan  DNR/DNI    Former tobacco use  Assessment & Plan  40-pack year smoking previously    Dyslipidemia, goal LDL below 100- (present on admission)  Assessment & Plan  statin    HTN (hypertension)- (present on admission)  Assessment & Plan  Losartan, metoprolol, digoxin       VTE prophylaxis: Warfarin

## 2021-05-12 NOTE — PROGRESS NOTES
0234: Patient in respiratory distress - audible wheezes and SOB. SBP > 200. Oral temp 99.2F. RT called to bedside. PRN Labetalol given. RT treatment given. O2 requirements increased from 2L NC to 8L mask. HOB elevated.    0245: /118. HR 91. Lung sounds coarse crackles throughout., increased work of breathing. Patient anxious and fatigued. Wet productive cough with clear, thin sputum.    0255. Dr. Aguirre called to bedside. Stat CXR ordered.    0258: Lasix IV 40mg once given.    0314: /98, 94% 8L mask, HR 88, RR 24.    0317: /98, 93% 8L mask.    0321: /97    0329: Losartan 50mg once given. Solumedrol 40mg once given.    0400: SBP 170s. Patient 94% 8L mask. Work of breathing decreased.

## 2021-05-12 NOTE — PROGRESS NOTES
Inpatient Anticoagulation Service Note    Date: 5/12/2021    Reason for Anticoagulation: Atrial Fibrillation   Target INR: 2.0 to 3.0  [id number] VASc Score: 5  HAS-BLED Score: 2   Hemoglobin Value: 13.2  Hematocrit Value: 41.3  Lab Platelet Value: 281    INR from last 7 days     Date/Time INR Value    05/10/21 2306  (!) 2.48    05/10/21 0250  (!) 2.32    05/09/21 1339  (!) 2.15    05/07/21 22:14:01  (!) 2.01        Dose from last 7 days     Date/Time Dose (mg)    05/12/21 1420  5    05/11/21 1640  2.5    05/10/21 1341  5    05/10/21 1338  2.5    05/09/21 1200  --    05/08/21 0407  5        Average Dose (mg): 5  Significant Interactions: Statin, Antibiotics  Bridge Therapy: No     Comments: No INR since 5/10. Pt is on her home warfarin dosing. Last INR on 5/10 was therapeutic at 2.48. Continue home dosing. If pt remains inpt beyond tomorrow, then will check an INR.    Education Material Provided?: No (on chronic therapy at home)  Pharmacist suggested discharge dosing: on pt's home dosing of warfarin 2.5 mg on Tuesdays and 5 mg all other days     Gildardo Bee, PharmD

## 2021-05-12 NOTE — CARE PLAN
Problem: Respiratory  Goal: Patient will achieve/maintain optimum respiratory ventilation and gas exchange  Outcome: Progressing     Problem: Fluid Volume  Goal: Fluid volume balance will be maintained  Outcome: Progressing     Problem: Knowledge Deficit - Standard  Goal: Patient and family/care givers will demonstrate understanding of plan of care, disease process/condition, diagnostic tests and medications  Outcome: Progressing   The patient is Stable - Low risk of patient condition declining or worsening         Summary of progress made towards problems/goals:  assessments, ambulation, labs

## 2021-05-12 NOTE — PROGRESS NOTES
Handoff report receivednot from day shift RN. Assumed pt care. Pt not in distress. Pt AOx 4, on 2L NC. Tele box on, rhythm verified. Safety precautions in place. Call light and personal belongings within reach. Educated to call for assistance if needed.

## 2021-05-12 NOTE — PROGRESS NOTES
Monitor Summary:    SR 67-89  R PHOEBE YOUSSEF PAC  .22/.08/.34  Medical at 2221  Back on Tele 0300

## 2021-05-12 NOTE — PROGRESS NOTES
Cross cover interval note:     Page from nursing staff regarding patient's progressive shortness of breath and respiratory distress.  Evaluated patient bedside is currently alert, awake and oriented x4.  Respiratory therapist and nursing staff bedside.  Patient receiving nebulizing treatment requiring 8 liters oxy-mask.  She is tachypneic, using accessory abdominal muscles and on auscultation rales on bilateral lung fields. Chest x-ray ordered stat.  IV Lasix 40 mg ordered x 1.      Assessment   Acute hypoxic respiratory failure likely secondary to pulmonary edema   COPD on domiciliary nocturnal oxygen  Former smoker  A. fib status post cardioversion  DM  HTN      Plan  Telemetry monitoring   Continue with oxygen supplementation to maintain spO2>88%  CXR showing pulmonary edema. IV Lasix 40 mg x 1 given. Continue with IV Lasix 40 mg BID   Losartan 50 mg daily   IV Solu-Medrol 40 mg every 8 hours. Taper as tolerated. Cover with ISS   Xopenex and Albuterol   RT protocol   Currently being treated with iv unasyn for aspiration pneumonia.   Monitor intake/output  2 gram carb consistent, fluid restricted diet  DNR/DNI    Patient is critically ill.   The patient continues to have: respiratory failure   The vital organ system that is affected is the: pulmonary   If untreated there is a high chance of deterioration into: respiratory arrest  And eventually death.   The critical care that I am providing today is: 30 minutes  The critical that has been undertaken is medically complex.   There has been no overlap in critical care time.   Critical Care Time not including procedures: 30 minutes       Yaakov Aguirre MD  Hospitalist

## 2021-05-13 ENCOUNTER — TELEPHONE (OUTPATIENT)
Dept: SLEEP MEDICINE | Facility: MEDICAL CENTER | Age: 81
End: 2021-05-13

## 2021-05-13 VITALS
OXYGEN SATURATION: 97 % | BODY MASS INDEX: 32.29 KG/M2 | SYSTOLIC BLOOD PRESSURE: 158 MMHG | DIASTOLIC BLOOD PRESSURE: 77 MMHG | TEMPERATURE: 97.1 F | WEIGHT: 189.15 LBS | HEART RATE: 61 BPM | HEIGHT: 64 IN | RESPIRATION RATE: 16 BRPM

## 2021-05-13 LAB
BACTERIA BLD CULT: NORMAL
BACTERIA BLD CULT: NORMAL
GLUCOSE BLD-MCNC: 135 MG/DL (ref 65–99)
INR PPP: 2.2 (ref 0.87–1.13)
PROTHROMBIN TIME: 25.1 SEC (ref 12–14.6)
SIGNIFICANT IND 70042: NORMAL
SIGNIFICANT IND 70042: NORMAL
SITE SITE: NORMAL
SITE SITE: NORMAL
SOURCE SOURCE: NORMAL
SOURCE SOURCE: NORMAL

## 2021-05-13 PROCEDURE — A9270 NON-COVERED ITEM OR SERVICE: HCPCS | Performed by: STUDENT IN AN ORGANIZED HEALTH CARE EDUCATION/TRAINING PROGRAM

## 2021-05-13 PROCEDURE — 82962 GLUCOSE BLOOD TEST: CPT

## 2021-05-13 PROCEDURE — 36415 COLL VENOUS BLD VENIPUNCTURE: CPT

## 2021-05-13 PROCEDURE — 99239 HOSP IP/OBS DSCHRG MGMT >30: CPT | Performed by: HOSPITALIST

## 2021-05-13 PROCEDURE — A9270 NON-COVERED ITEM OR SERVICE: HCPCS

## 2021-05-13 PROCEDURE — 85610 PROTHROMBIN TIME: CPT

## 2021-05-13 PROCEDURE — 700102 HCHG RX REV CODE 250 W/ 637 OVERRIDE(OP): Performed by: STUDENT IN AN ORGANIZED HEALTH CARE EDUCATION/TRAINING PROGRAM

## 2021-05-13 PROCEDURE — 700102 HCHG RX REV CODE 250 W/ 637 OVERRIDE(OP)

## 2021-05-13 PROCEDURE — 700111 HCHG RX REV CODE 636 W/ 250 OVERRIDE (IP): Performed by: STUDENT IN AN ORGANIZED HEALTH CARE EDUCATION/TRAINING PROGRAM

## 2021-05-13 RX ORDER — PREDNISONE 20 MG/1
40 TABLET ORAL DAILY
Qty: 6 TABLET | Refills: 0 | Status: SHIPPED | OUTPATIENT
Start: 2021-05-13 | End: 2021-05-16

## 2021-05-13 RX ORDER — SERTRALINE HYDROCHLORIDE 100 MG/1
100 TABLET, FILM COATED ORAL DAILY
Qty: 30 TABLET | Refills: 0 | Status: SHIPPED | OUTPATIENT
Start: 2021-05-13 | End: 2021-06-08

## 2021-05-13 RX ORDER — ALBUTEROL SULFATE 90 UG/1
2 AEROSOL, METERED RESPIRATORY (INHALATION) EVERY 6 HOURS PRN
Qty: 18 G | Refills: 0 | Status: SHIPPED | OUTPATIENT
Start: 2021-05-13

## 2021-05-13 RX ADMIN — SERTRALINE HYDROCHLORIDE 100 MG: 100 TABLET ORAL at 04:43

## 2021-05-13 RX ADMIN — FLECAINIDE ACETATE 100 MG: 100 TABLET ORAL at 04:43

## 2021-05-13 RX ADMIN — DIGOXIN 125 MCG: 125 TABLET ORAL at 04:44

## 2021-05-13 RX ADMIN — METOPROLOL SUCCINATE 100 MG: 50 TABLET, EXTENDED RELEASE ORAL at 04:43

## 2021-05-13 RX ADMIN — LORATADINE 10 MG: 10 TABLET ORAL at 04:43

## 2021-05-13 RX ADMIN — LOSARTAN POTASSIUM 50 MG: 50 TABLET, FILM COATED ORAL at 04:45

## 2021-05-13 RX ADMIN — FUROSEMIDE 40 MG: 10 INJECTION, SOLUTION INTRAMUSCULAR; INTRAVENOUS at 04:45

## 2021-05-13 NOTE — CARE PLAN
Problem: Respiratory  Goal: Patient will achieve/maintain optimum respiratory ventilation and gas exchange  Outcome: Progressing     Problem: Risk for Aspiration  Goal: Patient's risk for aspiration will be absent or decrease  Outcome: Progressing     Problem: Self Care  Goal: Patient will have the ability to perform ADLs independently or with assistance (bathe, groom, dress, toilet and feed)  Outcome: Progressing     Problem: Fall Risk  Goal: Patient will remain free from falls  Outcome: Progressing     Problem: Risk for Infection - COPD  Goal: Patient will remain free from signs and symptoms of infection  Outcome: Progressing

## 2021-05-13 NOTE — DISCHARGE INSTRUCTIONS
Chronic Obstructive Pulmonary Disease Exacerbation  Chronic obstructive pulmonary disease (COPD) is a long-term (chronic) lung problem. In COPD, the flow of air from the lungs is limited. COPD exacerbations are times that breathing gets worse and you need more than your normal treatment. Without treatment, they can be life threatening. If they happen often, your lungs can become more damaged. If your COPD gets worse, your doctor may treat you with:  · Medicines.  · Oxygen.  · Different ways to clear your airway, such as using a mask.  Follow these instructions at home:  Medicines  · Take over-the-counter and prescription medicines only as told by your doctor.  · If you take an antibiotic or steroid medicine, do not stop taking the medicine even if you start to feel better.  · Keep up with shots (vaccinations) as told by your doctor. Be sure to get a yearly (annual) flu shot.  Lifestyle  · Do not smoke. If you need help quitting, ask your doctor.  · Eat healthy foods.  · Exercise regularly.  · Get plenty of sleep.  · Avoid tobacco smoke and other things that can bother your lungs.  · Wash your hands often with soap and water. This will help keep you from getting an infection. If you cannot use soap and water, use hand .  · During flu season, avoid areas that are crowded with people.  General instructions  · Drink enough fluid to keep your pee (urine) clear or pale yellow. Do not do this if your doctor has told you not to.  · Use a cool mist machine (vaporizer).  · If you use oxygen or a machine that turns medicine into a mist (nebulizer), continue to use it as told.  · Follow all instructions for rehabilitation. These are steps you can take to make your body work better.  · Keep all follow-up visits as told by your doctor. This is important.  Contact a doctor if:  · Your COPD symptoms get worse than normal.  Get help right away if:  · You are short of breath and it gets worse.  · You have trouble  talking.  · You have chest pain.  · You cough up blood.  · You have a fever.  · You keep throwing up (vomiting).  · You feel weak or you pass out (faint).  · You feel confused.  · You are not able to sleep because of your symptoms.  · You are not able to do daily activities.  Summary  · COPD exacerbations are times that breathing gets worse and you need more treatment than normal.  · COPD exacerbations can be very serious and may cause your lungs to become more damaged.  · Do not smoke. If you need help quitting, ask your doctor.  · Stay up-to-date on your shots. Get a flu shot every year.  This information is not intended to replace advice given to you by your health care provider. Make sure you discuss any questions you have with your health care provider.  Document Released: 12/06/2012 Document Revised: 11/30/2018 Document Reviewed: 01/22/2018  [employer] Patient Education © 2020 [employer]      Electrical Cardioversion, Care After  This sheet gives you information about how to care for yourself after your procedure. Your health care provider may also give you more specific instructions. If you have problems or questions, contact your health care provider.  What can I expect after the procedure?  After the procedure, it is common to have:  · Some redness on the skin where the shocks were given.  Follow these instructions at home:    · Do not drive for 24 hours if you were given a medicine to help you relax (sedative).  · Take over-the-counter and prescription medicines only as told by your health care provider.  · Ask your health care provider how to check your pulse. Check it often.  · Rest for 48 hours after the procedure or as told by your health care provider.  · Avoid or limit your caffeine use as told by your health care provider.  Contact a health care provider if:  · You feel like your heart is beating too quickly or your pulse is not regular.  · You have a serious muscle cramp that does not go  away.  Get help right away if:    · You have discomfort in your chest.  · You are dizzy or you feel faint.  · You have trouble breathing or you are short of breath.  · Your speech is slurred.  · You have trouble moving an arm or leg on one side of your body.  · Your fingers or toes turn cold or blue.  This information is not intended to replace advice given to you by your health care provider. Make sure you discuss any questions you have with your health care provider.  Document Released: 10/08/2014 Document Revised: 11/30/2018 Document Reviewed: 06/23/2017  [employer] Patient Education © 2020 [employer]      Discharge Instructions    Discharged to home by car with relative. Discharged via wheelchair, hospital escort: Yes.  Special equipment needed: Oxygen    Be sure to schedule a follow-up appointment with your primary care doctor or any specialists as instructed.     Discharge Plan:   Diet Plan: Discussed  Activity Level: Discussed  Confirmed Follow up Appointment: Appointment Scheduled  Confirmed Symptoms Management: Discussed  Medication Reconciliation Updated: Yes    I understand that a diet low in cholesterol, fat, and sodium is recommended for good health. Unless I have been given specific instructions below for another diet, I accept this instruction as my diet prescription.   Other diet: heart healthy    Special Instructions: Chronic Obstructive Pulmonary Disease (COPD) is a long-term, progressive lung disease that makes it harder to breathe. It includes chronic bronchitis, emphysema, and refractory (non-reversible) asthma. With COPD, the airways in your lungs become inflamed and thicken, and the tissue where oxygen is exchanged is destroyed. The flow of air in and out of your lungs decreases. When that happens, less oxygen gets into your body tissues, and it becomes harder to get rid of the waste gas carbon dioxide. As the disease gets worse, shortness of breath makes it harder to remain active. There is  no cure for COPD, but it is preventable and treatable.    COPD Patient Discharge Instructions    • Diet  o Follow a low fat, low cholesterol, low salt diet unless instructed otherwise by your Doctor. Read the labels on the back of food products and track your intake of fat, cholesterol and salt.  • No smoking  o Discontinuing smoking will have the biggest impact on preventing progression of disease.  o To participate in [employer]’[facility] Quit Tobacco Program, call 903-5882 or visit [email or web address]  • Oxygen  o If your doctor has order that you wear oxygen at home, it is important to wear it as ordered.  o Do not smoke, vape, or use e-cigarettes with oxygen on.  o Store in an appropriate location: upright in its stapleton or laid flat down, away from open flames and stoves.   o Do not use oil-based creams and moisturizers (ie: petroleum products, oil-based lip moisturizers) or aerosol sprays (ie: hair sprays or deodorants) when using your oxygen equipment.  o Be careful with tubing placement to prevent yourself and others from tripping.  • Medications  o Refer to your personalized Action Plan to manage your symptoms.  • Warning signs of an exacerbation  o Breathing fast and shallow, worsening shortness of breath (like you just finished exercising)  o Chest tightness  o Increases in sputum production  o Changes in sputum color  o Lower oxygen levels than baseline  • When to call your doctor  o If the warning signs of an exacerbation do not improve  o Refer to your personalized Action Plan   • Pulmonary Rehab  o Your doctor has ordered you a referral to Pulmonary Rehab. Call [phone] to schedule an appointment  • Attend your follow-up appointment with your PCP and/or Pulmonologist  • Remote Monitoring: At the direction of the remote monitoring on-call provider, you may increase your oxygen by 2 liters above your baseline.     See the educational handout provided by your COPD Navigator for more information. This also  explains more about COPD, symptoms of an exacerbation, and some of the tests that you have undergone.    · Is patient discharged on Warfarin / Coumadin?   Yes    You are receiving the drug warfarin. Please understand the importance of monitoring warfarin with scheduled PT/INR blood draws.  Follow-up with a call to your personal Doctor's office in 3 days to schedule a PT/INR. .    IMPORTANT: HOW TO USE THIS INFORMATION:  This is a summary and does NOT have all possible information about this product. This information does not assure that this product is safe, effective, or appropriate for you. This information is not individual medical advice and does not substitute for the advice of your health care professional. Always ask your health care professional for complete information about this product and your specific health needs.      WARFARIN - ORAL (WARF-uh-rin)      COMMON BRAND NAME(S): Coumadin      WARNING:  Warfarin can cause very serious (possibly fatal) bleeding. This is more likely to occur when you first start taking this medication or if you take too much warfarin. To decrease your risk for bleeding, your doctor or other health care provider will monitor you closely and check your lab results (INR test) to make sure you are not taking too much warfarin. Keep all medical and laboratory appointments. Tell your doctor right away if you notice any signs of serious bleeding. See also Side Effects section.      USES:  This medication is used to treat blood clots (such as in deep vein thrombosis-DVT or pulmonary embolus-PE) and/or to prevent new clots from forming in your body. Preventing harmful blood clots helps to reduce the risk of a stroke or heart attack. Conditions that increase your risk of developing blood clots include a certain type of irregular heart rhythm (atrial fibrillation), heart valve replacement, recent heart attack, and certain surgeries (such as hip/knee replacement). Warfarin is commonly  "called a \"blood thinner,\" but the more correct term is \"anticoagulant.\" It helps to keep blood flowing smoothly in your body by decreasing the amount of certain substances (clotting proteins) in your blood.      HOW TO USE:  Read the Medication Guide provided by your pharmacist before you start taking warfarin and each time you get a refill. If you have any questions, ask your doctor or pharmacist. Take this medication by mouth with or without food as directed by your doctor or other health care professional, usually once a day. It is very important to take it exactly as directed. Do not increase the dose, take it more frequently, or stop using it unless directed by your doctor. Dosage is based on your medical condition, laboratory tests (such as INR), and response to treatment. Your doctor or other health care provider will monitor you closely while you are taking this medication to determine the right dose for you. Use this medication regularly to get the most benefit from it. To help you remember, take it at the same time each day. It is important to eat a balanced, consistent diet while taking warfarin. Some foods can affect how warfarin works in your body and may affect your treatment and dose. Avoid sudden large increases or decreases in your intake of foods high in vitamin K (such as broccoli, cauliflower, cabbage, brussels sprouts, kale, spinach, and other green leafy vegetables, liver, green tea, certain vitamin supplements). If you are trying to lose weight, check with your doctor before you try to go on a diet. Cranberry products may also affect how your warfarin works. Limit the amount of cranberry juice (16 ounces/480 milliliters a day) or other cranberry products you may drink or eat.      SIDE EFFECTS:  Nausea, loss of appetite, or stomach/abdominal pain may occur. If any of these effects persist or worsen, tell your doctor or pharmacist promptly. Remember that your doctor has prescribed this " medication because he or she has judged that the benefit to you is greater than the risk of side effects. Many people using this medication do not have serious side effects. This medication can cause serious bleeding if it affects your blood clotting proteins too much (shown by unusually high INR lab results). Even if your doctor stops your medication, this risk of bleeding can continue for up to a week. Tell your doctor right away if you have any signs of serious bleeding, including: unusual pain/swelling/discomfort, unusual/easy bruising, prolonged bleeding from cuts or gums, persistent/frequent nosebleeds, unusually heavy/prolonged menstrual flow, pink/dark urine, coughing up blood, vomit that is bloody or looks like coffee grounds, severe headache, dizziness/fainting, unusual or persistent tiredness/weakness, bloody/black/tarry stools, chest pain, shortness of breath, difficulty swallowing. Tell your doctor right away if any of these unlikely but serious side effects occur: persistent nausea/vomiting, severe stomach/abdominal pain, yellowing eyes/skin. This drug rarely has caused very serious (possibly fatal) problems if its effects lead to small blood clots (usually at the beginning of treatment). This can lead to severe skin/tissue damage that may require surgery or amputation if left untreated. Patients with certain blood conditions (protein C or S deficiency) may be at greater risk. Get medical help right away if any of these rare but serious side effects occur: painful/red/purplish patches on the skin (such as on the toe, breast, abdomen), change in the amount of urine, vision changes, confusion, slurred speech, weakness on one side of the body. A very serious allergic reaction to this drug is rare. However, get medical help right away if you notice any symptoms of a serious allergic reaction, including: rash, itching/swelling (especially of the face/tongue/throat), severe dizziness, trouble breathing. This  is not a complete list of possible side effects. If you notice other effects not listed above, contact your doctor or pharmacist. In the [place] - Call your doctor for medical advice about side effects. You may report side effects to FDA at 6-136-KAQ-3843. In [place] - Call your doctor for medical advice about side effects. You may report side effects to [facility] [place] at [phone].      PRECAUTIONS:  Before taking warfarin, tell your doctor or pharmacist if you are allergic to it; or if you have any other allergies. This product may contain inactive ingredients, which can cause allergic reactions or other problems. Talk to your pharmacist for more details. Before using this medication, tell your doctor or pharmacist your medical history, especially of: blood disorders (such as anemia, hemophilia), bleeding problems (such as bleeding of the stomach/intestines, bleeding in the brain), blood vessel disorders (such as aneurysms), recent major injury/surgery, liver disease, alcohol use, mental/mood disorders (including memory problems), frequent falls/injuries. It is important that all your doctors and dentists know that you take warfarin. Before having surgery or any medical/dental procedures, tell your doctor or dentist that you are taking this medication and about all the products you use (including prescription drugs, nonprescription drugs, and herbal products). Avoid getting injections into the muscles. If you must have an injection into a muscle (for example, a flu shot), it should be given in the arm. This way, it will be easier to check for bleeding and/or apply pressure bandages. This medication may cause stomach bleeding. Daily use of alcohol while using this medicine will increase your risk for stomach bleeding and may also affect how this medication works. Limit or avoid alcoholic beverages. If you have not been eating well, if you have an illness or infection that causes fever, vomiting, or diarrhea for more  than 2 days, or if you start using any antibiotic medications, contact your doctor or pharmacist immediately because these conditions can affect how warfarin works. This medication can cause heavy bleeding. To lower the chance of getting cut, bruised, or injured, use great caution with sharp objects like safety razors and nail cutters. Use an electric razor when shaving and a soft toothbrush when brushing your teeth. Avoid activities such as contact sports. If you fall or injure yourself, especially if you hit your head, call your doctor immediately. Your doctor may need to check you. The Food & Drug Administration has stated that generic warfarin products are interchangeable. However, consult your doctor or pharmacist before switching warfarin products. Be careful not to take more than one medication that contains warfarin unless specifically directed by the doctor or health care provider who is monitoring your warfarin treatment. Older adults may be at greater risk for bleeding while using this drug. This medication is not recommended for use during pregnancy because of serious (possibly fatal) harm to an unborn baby. Discuss the use of reliable forms of birth control with your doctor. If you become pregnant or think you may be pregnant, tell your doctor immediately. If you are planning pregnancy, discuss a plan for managing your condition with your doctor before you become pregnant. Your doctor may switch the type of medication you use during pregnancy. Very small amounts of this medication may pass into breast milk but is unlikely to harm a nursing infant. Consult your doctor before breast-feeding.      DRUG INTERACTIONS:  Drug interactions may change how your medications work or increase your risk for serious side effects. This document does not contain all possible drug interactions. Keep a list of all the products you use (including prescription/nonprescription drugs and herbal products) and share it with  "your doctor and pharmacist. Do not start, stop, or change the dosage of any medicines without your doctor's approval. Warfarin interacts with many prescription, nonprescription, vitamin, and herbal products. This includes medications that are applied to the skin or inside the vagina or rectum. The interactions with warfarin usually result in an increase or decrease in the \"blood-thinning\" (anticoagulant) effect. Your doctor or other health care professional should closely monitor you to prevent serious bleeding or clotting problems. While taking warfarin, it is very important to tell your doctor or pharmacist of any changes in medications, vitamins, or herbal products that you are taking. Some products that may interact with this drug include: capecitabine, imatinib, mifepristone. Aspirin, aspirin-like drugs (salicylates), and nonsteroidal anti-inflammatory drugs (NSAIDs such as ibuprofen, naproxen, celecoxib) may have effects similar to warfarin. These drugs may increase the risk of bleeding problems if taken during treatment with warfarin. Carefully check all prescription/nonprescription product labels (including drugs applied to the skin such as pain-relieving creams) since the products may contain NSAIDs or salicylates. Talk to your doctor about using a different medication (such as acetaminophen) to treat pain/fever. Low-dose aspirin and related drugs (such as clopidogrel, ticlopidine) should be continued if prescribed by your doctor for specific medical reasons such as heart attack or stroke prevention. Consult your doctor or pharmacist for more details. Many herbal products interact with warfarin. Tell your doctor before taking any herbal products, especially bromelains, coenzyme Q10, cranberry, danshen, dong quai, fenugreek, garlic, ginkgo biloba, ginseng, and [employer]'s wort, among others. This medication may interfere with a certain laboratory test to measure theophylline levels, possibly causing false " test results. Make sure laboratory personnel and all your doctors know you use this drug.      OVERDOSE:  If overdose is suspected, contact a poison control center or emergency room immediately. [place] residents can call the [place] National Poison Hotline at 1-447.408.2515. [place] residents can call a provincial poison control center. Symptoms of overdose may include: bloody/black/tarry stools, pink/dark urine, unusual/prolonged bleeding.      NOTES:  Do not share this medication with others. Laboratory and/or medical tests (such as INR, complete blood count) must be performed periodically to monitor your progress or check for side effects. Consult your doctor for more details.      MISSED DOSE:  For the best possible benefit, do not miss any doses. If you do miss a dose and remember on the same day, take it as soon as you remember. If you remember on the next day, skip the missed dose and resume your usual dosing schedule. Do not double the dose to catch up because this could increase your risk for bleeding. Keep a record of missed doses to give to your doctor or pharmacist. Contact your doctor or pharmacist if you miss 2 or more doses in a row.      STORAGE:  Store at room temperature away from light and moisture. Do not store in the bathroom. Keep all medications away from children and pets. Do not flush medications down the toilet or pour them into a drain unless instructed to do so. Properly discard this product when it is  or no longer needed. Consult your pharmacist or local waste disposal company for more details about how to safely discard your product.      MEDICAL ALERT:  Your condition and medication can cause complications in a medical emergency. For information about enrolling in MedicAlert, call 1-381.867.7969 ([place]) or 1-733.826.5165 ([place]).      Information last revised 2010 Copyright(c) 2010 [employer]             Depression / Suicide Risk    As you are discharged from this [facility]  Health facility, it is important to learn how to keep safe from harming yourself.    Recognize the warning signs:  · Abrupt changes in personality, positive or negative- including increase in energy   · Giving away possessions  · Change in eating patterns- significant weight changes-  positive or negative  · Change in sleeping patterns- unable to sleep or sleeping all the time   · Unwillingness or inability to communicate  · Depression  · Unusual sadness, discouragement and loneliness  · Talk of wanting to die  · Neglect of personal appearance   · Rebelliousness- reckless behavior  · Withdrawal from people/activities they love  · Confusion- inability to concentrate     If you or a loved one observes any of these behaviors or has concerns about self-harm, here's what you can do:  · Talk about it- your feelings and reasons for harming yourself  · Remove any means that you might use to hurt yourself (examples: pills, rope, extension cords, firearm)  · Get professional help from the community (Mental Health, Substance Abuse, psychological counseling)  · Do not be alone:Call your Safe Contact- someone whom you trust who will be there for you.  · Call your local CRISIS HOTLINE 259-0702 or 639-729-4161  · Call your local Children's Mobile Crisis Response Team [place] (514) 839-9161 or www.[employer]  · Call the toll free National Suicide Prevention Hotlines   · National Suicide Prevention Lifeline 163-423-AWKO (9790)  · National Hope Line Network 800-SUICIDE (710-3453)

## 2021-05-13 NOTE — PROGRESS NOTES
Inpatient Anticoagulation Service Note    Date: 5/13/2021    Reason for Anticoagulation: Atrial Fibrillation   Target INR: 2.0 to 3.0  [id number] VASc Score: 5  HAS-BLED Score: 2   Hemoglobin Value: 13.2  Hematocrit Value: 41.3  Lab Platelet Value: 281    INR from last 7 days     Date/Time INR Value    05/13/21 0457  (!) 2.2    05/11/21 2333  (!) 2.69    05/10/21 2306  (!) 2.48    05/10/21 0250  (!) 2.32    05/09/21 1339  (!) 2.15    05/07/21 22:14:01  (!) 2.01        Dose from last 7 days     Date/Time Dose (mg)    05/13/21 1330  5    05/12/21 1420  5    05/11/21 1640  2.5    05/10/21 1341  5    05/10/21 1338  2.5    05/09/21 1200  --    05/08/21 0407  5        Average Dose (mg): 5  Significant Interactions: Statin, Antibiotics  Bridge Therapy: No     Reversal Agent Administered: Not Applicable  Comments: INR is therapeutic on pt's home warfarin dosing. Will continue the same    Education Material Provided?: No (on chronic therapy at home)  Pharmacist suggested discharge dosing: on pt's home dosing of warfarin 2.5 mg on Tuesdays and 5 mg all other days     Gildardo Bee, PharmD

## 2021-05-14 LAB
BACTERIA BLD CULT: NORMAL
BACTERIA BLD CULT: NORMAL
SIGNIFICANT IND 70042: NORMAL
SIGNIFICANT IND 70042: NORMAL
SITE SITE: NORMAL
SITE SITE: NORMAL
SOURCE SOURCE: NORMAL
SOURCE SOURCE: NORMAL

## 2021-05-17 ENCOUNTER — OFFICE VISIT (OUTPATIENT)
Dept: SLEEP MEDICINE | Facility: MEDICAL CENTER | Age: 81
End: 2021-05-17
Payer: MEDICARE

## 2021-05-17 VITALS
HEIGHT: 64 IN | OXYGEN SATURATION: 95 % | DIASTOLIC BLOOD PRESSURE: 80 MMHG | SYSTOLIC BLOOD PRESSURE: 124 MMHG | HEART RATE: 64 BPM | WEIGHT: 188 LBS | RESPIRATION RATE: 16 BRPM | BODY MASS INDEX: 32.1 KG/M2

## 2021-05-17 DIAGNOSIS — Z87.891 FORMER TOBACCO USE: ICD-10-CM

## 2021-05-17 DIAGNOSIS — R09.02 HYPOXIA: ICD-10-CM

## 2021-05-17 DIAGNOSIS — J44.1 CHRONIC OBSTRUCTIVE PULMONARY DISEASE WITH ACUTE EXACERBATION (HCC): ICD-10-CM

## 2021-05-17 PROCEDURE — 99213 OFFICE O/P EST LOW 20 MIN: CPT | Performed by: PHYSICIAN ASSISTANT

## 2021-05-17 PROCEDURE — 94761 N-INVAS EAR/PLS OXIMETRY MLT: CPT | Performed by: PHYSICIAN ASSISTANT

## 2021-05-17 ASSESSMENT — ENCOUNTER SYMPTOMS
CHILLS: 0
DIZZINESS: 1
SORE THROAT: 0
TREMORS: 0
SINUS PAIN: 0
INSOMNIA: 1
WHEEZING: 1
ORTHOPNEA: 0
WEIGHT LOSS: 0
FEVER: 0
SHORTNESS OF BREATH: 0
SPUTUM PRODUCTION: 1
HEARTBURN: 0
COUGH: 1
HEADACHES: 1
PALPITATIONS: 0

## 2021-05-17 ASSESSMENT — FIBROSIS 4 INDEX: FIB4 SCORE: 1.85

## 2021-05-17 NOTE — PROGRESS NOTES
CC: Mild fatigue    HPI:  Laurie Adams is a 80 y.o. year old female here today for follow-up on COPD.  She is a former smoker with reported 40-pack-year history and quit date in 1999, reports significant secondhand exposure in childhood as well..  Patient is a hospital follow-up, she was life flighted from [facility] on 5/7/2021 for acute CHF exacerbation post cardioversion for A. fib.  She was admitted for 6 days.    Patient reports her primary is Dr. Grider at [facility] which she has been having trouble making an appointment with.  Patient admitted for 6 days.  She did have cardioversion repeated.    Pertinent past medical history also includes arthritis, bronchial asthma, bronchitis, skin cancer, multiple TIAs, PSVT, atrial fib, aspiration pneumonia, sepsis, acute on chronic respiratory failure, acute pulmonary edema.    Reviewed in clinic vitals including /80, HR 64, O2 sat of 95% on 2 L.Patient's body mass index is 32.27 kg/m². Exercise and nutrition counseling were performed at this visit.    Reviewed home medication regimen including albuterol which she has reported not needing lately, furosemide, Flonase, Benadryl which she takes 2-3 times per week for sleep, levo cetirizine allergy.  She has been on O2 2 to 3 L at night prior to admission and 2 L ATC since.    Reviewed most recent imaging including chest x-ray obtained 5/12/2021 demonstrating pulmonary edema, cardiomegaly, atherosclerosis.    Echocardiogram was performed 5/7/2021 demonstrating normal left ventricular chamber size, wall thickness and systolic function.  LVEF estimated 65%, diastolic function difficult to assess due to arrhythmia.  Normal right ventricular size and systolic function, enlarged right atrium, severely dilated left atrium, trace mitral regurgitation, moderate tricuspid regurgitation with estimated RVSP of 46 mmHg.     CTA obtained 5/8/2021, demonstrated patchy bibasilar opacities, interlobular  "septal thickening and hazy groundglass opacity throughout both lungs greater in the dependent portion of the right upper lobe, mild to moderate bilateral pleural effusions, enlarged left atrium, no evidence of pulmonary emboli.    No pulmonary function testing on file.    Review of Systems   Constitutional: Positive for malaise/fatigue (mild ). Negative for chills, fever and weight loss.   HENT: Positive for congestion (allergies) and tinnitus (rare ). Negative for hearing loss, nosebleeds, sinus pain and sore throat.    Eyes:        Presc glasses   Respiratory: Positive for cough, sputum production (yellow tan about once per day ) and wheezing (occasional, mild ). Negative for shortness of breath.    Cardiovascular: Negative for chest pain, palpitations and orthopnea.   Gastrointestinal: Negative for heartburn.        No dentures, missing a few teeth, some difficulty swallowing    Neurological: Positive for dizziness (associated with atrial fib) and headaches (dull ). Negative for tremors.   Psychiatric/Behavioral: The patient has insomnia (occasional falling asleep ).        Past Medical History:   Diagnosis Date   • Arrhythmia 03/2015    A-fib takes flecainide   • Arthritis     fingers- osteo   • Breath shortness     02 @ 2L NOC   • Bronchial asthma 6/29/2012    Inhalers prn   • Bronchitis    • Cancer (HCC) 2005    squamous cell skin left chest   • Cataract     galo IOL   • Cerebral atherosclerosis    • Cold     Pt had \"head cold 6/1, surgery cristofer from 6/4 to 6/7, pt denies productive cough and SOB   • Dependence on supplemental oxygen     2 liters at night.   • Dizziness and giddiness     Episodic since ~2009   • Dyslipidemia, goal LDL below 100 6/29/2012   • High cholesterol    • History of tobacco use    • HTN (hypertension) 6/29/2012   • Obesity 6/29/2012   • Pneumonia     2015   • Stroke (HCC) 2010 2010-2017, multiple TIA's, generalized weakness   • TIA (transient ischemic attack) 6/29/2012   • Urinary " incontinence        Past Surgical History:   Procedure Laterality Date   • KNEE ARTHROPLASTY TOTAL Right 2018    Procedure: KNEE ARTHROPLASTY TOTAL;  Surgeon: Eric Bunn M.D.;  Location: SURGERY [facility];  Service: Orthopedics   • CATARACT PHACO WITH IOL  2013    Performed by Noe Jean M.D. at [facility]   • CATARACT PHACO WITH IOL  2013    Performed by Noe Jean M.D. at [facility]   • SEPTOPLASTY  2009    Performed by PABLITO LORENZANA at SURGERY [facility]   • SOMNOPLASTY  2009    Performed by PABLITO LORENZANA at SURGERY [facility]   • ANTROSTOMY  2009    Performed by PABLITO LORENZANA at SURGERY [facility]   • ETHMOIDECTOMY  2009    Performed by PABLITO LORENZANA at [facility]   • OTHER      sinus   • COLON RESECTION     • CHOLECYSTECTOMY     • APPENDECTOMY     • OTHER      mastoid   • GANGLION EXCISION Bilateral ,     left wrist, right finger   • HYSTERECTOMY, VAGINAL     • MASTOIDECTOMY     • PB CHOLECYSTOENTEROSTOMY+VIVIANA-EN-Y     • PB NASAL SCOPY,REPB CSF LEAK,SPHENOID     • WRIST FUSION         History reviewed. No pertinent family history.    Social History     Socioeconomic History   • Marital status:      Spouse name: Not on file   • Number of children: Not on file   • Years of education: Not on file   • Highest education level: Not on file   Occupational History   • Not on file   Tobacco Use   • Smoking status: Former Smoker     Packs/day: 1.00     Years: 40.00     Pack years: 40.00     Types: Cigarettes     Quit date: 1999     Years since quittin.7   • Smokeless tobacco: Never Used   Vaping Use   • Vaping Use: Never used   Substance and Sexual Activity   • Alcohol use: No   • Drug use: No   • Sexual activity: Yes     Partners: Male   Other Topics Concern   • Not on file   Social History Narrative   • Not on file     Social Determinants of  "Health     Financial Resource Strain:    • Difficulty of Paying Living Expenses:    Food Insecurity:    • Worried About Running Out of Food in the Last Year:    • Ran Out of Food in the Last Year:    Transportation Needs:    • Lack of Transportation (Medical):    • Lack of Transportation (Non-Medical):    Physical Activity:    • Days of Exercise per Week:    • Minutes of Exercise per Session:    Stress:    • Feeling of Stress :    Social Connections:    • Frequency of Communication with Friends and Family:    • Frequency of Social Gatherings with Friends and Family:    • Attends [employer] Services:    • Active Member of Clubs or Organizations:    • Attends Club or Organization Meetings:    • Marital Status:    Intimate Partner Violence:    • Fear of Current or Ex-Partner:    • Emotionally Abused:    • Physically Abused:    • Sexually Abused:        Allergies as of 05/17/2021 - Reviewed 05/11/2021   Allergen Reaction Noted   • Feldene [piroxicam] Photosensitivity 08/13/2009   • Sulfa drugs Hives 08/13/2009   • Codeine Anxiety 08/13/2009        @Vital signs for this encounter:  Vitals:    05/17/21 1034 05/17/21 1037   Height: 1.626 m (5' 4\")    Weight: 85.3 kg (188 lb)    Weight % change since last entry.: 0 %    BP: 124/80    Pulse: 64    BMI (Calculated): 32.27    Resp: 16    O2 Flow Rate (L/min):  2       Current medications as of today   Current Outpatient Medications   Medication Sig Dispense Refill   • albuterol (VENTOLIN HFA) 108 (90 Base) MCG/ACT Aero Soln inhalation aerosol Inhale 2 Puffs every 6 hours as needed for Shortness of Breath. 18 g 0   • sertraline (ZOLOFT) 100 MG Tab Take 1 tablet by mouth every day. 30 tablet 0   • furosemide (LASIX) 40 MG Tab Take 1 tablet by mouth 1 time a day as needed (weight gain, swelling, shortness of breath). 30 tablet 2   • potassium chloride SA (KDUR) 20 MEQ Tab CR Take 1 tablet by mouth 1 time a day as needed (take with lasix only). 30 tablet 1   • losartan (COZAAR) 50 " MG Tab Take 1 tablet by mouth every day. 30 tablet 0   • Biotin 1000 MCG Tab Take 1,000 mcg by mouth every day.     • Multiple Vitamins-Minerals (PRESERVISION AREDS 2) Cap Take 1 capsule by mouth every day.     • Cholecalciferol (VITAMIN D-3) 125 MCG (5000 UT) Tab Take 5,000 Units by mouth every day.     • Cyanocobalamin (VITAMIN B-12) 5000 MCG TABLET DISPERSIBLE Take 5,000 Units by mouth every day.     • fluticasone (FLONASE) 50 MCG/ACT nasal spray Administer 1 Spray into affected nostril(S) 1 time a day as needed (allergys).     • diphenhydrAMINE HCl (BENADRYL PO) Take 1 tablet by mouth at bedtime as needed (sleep).     • Acetaminophen (TYLENOL PO) Take 1 tablet by mouth 1 time a day as needed (pain).     • loratadine (CLARITIN) 10 MG Tab Take 10 mg by mouth every day.     • flecainide (TAMBOCOR) 100 MG Tab Take 1 tablet by mouth 2 times a day. 60 tablet 11   • metoprolol SR (TOPROL XL) 100 MG TABLET SR 24 HR Take 1 tablet by mouth once daily (Patient taking differently: Take 100 mg by mouth every day.) 90 tablet 0   • digoxin (LANOXIN) 125 MCG Tab Take 1 Tab by mouth every day. 30 Tab 11   • warfarin (COUMADIN) 5 MG Tab TAKE 1 TO 1 & 1/2 (ONE TO ONE & ONE-HALF) TABLETS BY MOUTH ONCE DAILY (Patient taking differently: Take 2.5-5 mg by mouth every day. Take 1 tablet = 5 mg every Mon, Wens, Thurs, Fri, Sat, Sun  Take 1/2 tablet = 2.5 mg every Tues.) 135 Tab 3   • atorvastatin (LIPITOR) 40 MG Tab Take 1 tablet by mouth once daily (Patient taking differently: Take 40 mg by mouth every evening.) 90 Tab 3     No current facility-administered medications for this visit.         Physical Exam:   Gen:           Alert and oriented, No apparent distress. Mood and affect appropriate, normal interaction with provider.  Eyes:          sclere white, conjunctive moist.  Hearing:     Grossly intact.  Dentition:    Poor dentition.  Oropharynx:   Tongue normal, posterior pharynx without erythema or exudate.  Neck:        Supple,  trachea midline, no masses.  Respiratory Effort: No intercostal retractions or use of accessory muscles.   Lung Auscultation:       bilaterally; no rales, rhonchi or wheezing.  CV:            Regular rate and rhythm. No edema. No murmurs, rubs or gallops.  Digits, Nails, Ext: No clubbing, cyanosis, petechiae, or nodes.   Skin:        No rashes, lesions or ulcers noted on exposed skin surfaces.                     Assessment:  1. Chronic obstructive pulmonary disease with acute exacerbation (HCC)  PULMONARY FUNCTION TESTS -Test requested: Complete Pulmonary Function Test   2. Hypoxia  Multiple Oximetry    Multiple Oximetry   3. Former tobacco use         Immunizations:    Flu: Last in 2017  Pneumovax 23: Declined  Prevnar 13: Declined    Plan:    80 y.o. year old female here today for follow-up on COPD.  She is a former smoker with reported 40-pack-year history and quit date in 1999, reports significant secondhand exposure in childhood as well..  Patient is a hospital follow-up, she was life flighted from [facility] on 5/7/2021 for acute CHF exacerbation post cardioversion for A. fib.  She was admitted for 6 days.    Former smoker: Patient remains abstinent of tobacco.    Patient reports her primary is Dr. Grider at [facility] which she has been having trouble making an appointment with.  Patient admitted for 6 days.  She did have cardioversion repeated.    Pertinent past medical history also includes arthritis, bronchial asthma, bronchitis, skin cancer, multiple TIAs, PSVT, atrial fib, aspiration pneumonia, sepsis, acute on chronic respiratory failure, acute pulmonary edema.    Reviewed in clinic vitals including /80, HR 64, O2 sat of 95% on 2 L.Patient's body mass index is 32.27 kg/m². Exercise and nutrition counseling were performed at this visit.    Chronic respiratory failure with hypoxia: Reports prior to recent hospitalization only requiring O2 at night.  Reports recent switch to  [facility] from [place].  O2 sats 95% on 2 L in clinic.  She did demonstrate continued need for oxygen.    Reviewed home medication regimen including albuterol which she has reported not needing lately, furosemide, Flonase, Benadryl which she takes 2-3 times per week for sleep, levo cetirizine allergy.  She has been on O2 2 to 3 L at night prior to admission and 2 L ATC since.    COPD: Patient reports infrequent albuterol use, patient has not been on a maintenance inhaler and this should be considered.  Short-term follow-up with PFT and readdress.    Patient reports having Covid vaccine x2, continue current precautions per CDC recommendations.    Patient reports having difficulty getting into primary, will contact clinic and attempt to facilitate follow-up.  Follow-up in 4 weeks at pulmonary clinic.    This dictation was created using voice recognition software. The accuracy of the dictation is limited to the abilities of the software. I expect there may be some errors of grammar and possibly content.

## 2021-05-17 NOTE — PROGRESS NOTES
"COPD education provided to patient in clinic.  Action Plan reviewed.  Discussed indications for PRN medications.  Q&A session provided.  Instructed patient to call Pulmonary Nurse Navigator with any concerns.    MY COPD ACTION PLAN     It is recommended that patients and physicians /healthcare providers complete this action plan together. This plan should be discussed at each physician visit and updated as needed.    The green, yellow and red zones show groups of symptoms of COPD. This list of symptoms is not comprehensive, and you may experience other symptoms. In the \"Actions\" column, your healthcare provider has recommended actions for you to take based on your symptoms.    Patient Name: Laurie Adams   YOB: 1940   Last Updated on:     Green Zone:  I am doing well today Actions   •  Usual activitiy and exercise level •  Take daily medications   •  Usual amounts of cough and phlegm/mucus •  Use oxygen as prescribed   •  Sleep well at night •  Continue regular exercise/diet plan   •  Appetite is good •  At all times avoid cigarette smoke, inhaled irritants     Daily Medications (these medications are taken every day):             Additional Information:  Talk with your doctor about continuing medications from hospital admission    Yellow Zone:  I am having a bad day or a COPD flare Actions   •  More breathless than usual •  Continue daily medications   •  I have less energy for my daily activities •  Use quick relief inhaler as ordered   •  Increased or thicker phlegm/mucus •  Use oxygen as prescribed   •  Using quick relief inhaler/nebulizer more often •  Get plenty of rest   •  Swelling of ankles more than usual •  Use pursed lip breathing   •  More coughing than usual •  At all times avoid cigarette smoke, inhaled irritants   •  I feel like I have a \"chest cold\"   •  Poor sleep and my symptoms woke me up   •  My appetite is not good   •  My medicine is not helping    •  Call provider " immediately if symptoms don’t improve     Continue daily medications, add rescue medications:    Albuterol Inhaler  2 Puffs  Every 6 hours As needed       Medications to be used during a flare up, (as Discussed with Provider):           Additional Information:  Please talk with your Doctor about using recuse inhaler (medication change requested from Albuterol to Xopenex)    Red Zone:  I need urgent medical care Actions   •  Severe shortness of breath even at rest •  Call 911 or seek medical care immediately   •  Not able to do any activity because of breathing    •  Fever or shaking chills    •  Feeling confused or very drowsy     •  Chest pains    •  Coughing up blood

## 2021-05-17 NOTE — PATIENT INSTRUCTIONS
1-indications for albuterol use   -increased cough, shortness of breath or work of breathing   -wheezing    2-demonstrated continued need for oxygen in clinic with ambulation, has been on night time oxygen for 15+ years    3-recently switched to [facility] from [facility]     4-had covid vaccine x 2    5-continue current precautions per CDC recommendations    6-follow up in 4 weeks with PFT    7-call clinic for any questions or concerns    8-patient previously seen at [facility]

## 2021-05-18 NOTE — PROCEDURES
Multi-Ox Readings  Multi Ox #1 Room air   O2 sat % at rest 94   O2 sat % on exertion 90   O2 sat average on exertion     Multi Ox #2     O2 sat % at rest     O2 sat % on exertion     O2 sat average on exertion       Oxygen Use     Oxygen Frequency     Duration of need     Is the patient mobile within the home?     CPAP Use?     BIPAP Use?     Servo Titration

## 2021-05-20 DIAGNOSIS — G45.9 TRANSIENT CEREBRAL ISCHEMIA, UNSPECIFIED TYPE: ICD-10-CM

## 2021-05-20 DIAGNOSIS — I48.0 PAROXYSMAL ATRIAL FIBRILLATION (HCC): ICD-10-CM

## 2021-05-20 DIAGNOSIS — E78.5 HYPERLIPIDEMIA, UNSPECIFIED HYPERLIPIDEMIA TYPE: ICD-10-CM

## 2021-05-20 RX ORDER — ATORVASTATIN CALCIUM 40 MG/1
TABLET, FILM COATED ORAL
Qty: 90 TABLET | Refills: 3 | Status: SHIPPED | OUTPATIENT
Start: 2021-05-20 | End: 2022-05-27

## 2021-05-20 RX ORDER — METOPROLOL SUCCINATE 100 MG/1
100 TABLET, EXTENDED RELEASE ORAL DAILY
Qty: 90 TABLET | Refills: 3 | Status: SHIPPED | OUTPATIENT
Start: 2021-05-20 | End: 2021-06-08

## 2021-06-04 ENCOUNTER — ANTICOAGULATION MONITORING (OUTPATIENT)
Dept: VASCULAR LAB | Facility: MEDICAL CENTER | Age: 81
End: 2021-06-04

## 2021-06-04 ENCOUNTER — TELEPHONE (OUTPATIENT)
Dept: VASCULAR LAB | Facility: MEDICAL CENTER | Age: 81
End: 2021-06-04

## 2021-06-04 DIAGNOSIS — G45.9 TIA (TRANSIENT ISCHEMIC ATTACK): ICD-10-CM

## 2021-06-04 LAB — INR PPP: 2.4 (ref 2–3.5)

## 2021-06-04 NOTE — PROGRESS NOTES
Anticoagulation Summary  As of 2021    INR goal:  2.0-3.0   TTR:  73.6 % (3.5 y)   INR used for dosin.40 (2021)   Warfarin maintenance plan:  2.5 mg (5 mg x 0.5) every Tue; 5 mg (5 mg x 1) all other days   Weekly warfarin total:  32.5 mg   Plan last modified:  Deb Pandya (2020)   Next INR check:  2021   Target end date:  Indefinite    Indications    TIA (transient ischemic attack) [G45.9]  Atrial fibrillation (CMS-HCC) [I48.91] [I48.91]             Anticoagulation Episode Summary     INR check location:  Outside Lab    Preferred lab:      Send INR reminders to:      Comments:  [facility]      Anticoagulation Care Providers     Provider Role Specialty Phone number    Narciso Contreras M.D.  Cardiology [phone]    AISHWARYA MuellerP.N. Responsible Cardiology [phone]    AISHWARYA BartonP.NShaan Responsible Cardiology [phone]    [facility] Anticoagulation Services Responsible  [phone]        Anticoagulation Patient Findings      Spoke with patient to report a therapeutic INR.      Pt denies any s/s of bleeding, bruising, clotting or any changes to diet or medication.    Pt not on antiplatelet therapy   Pt instructed to continue with current warfarin dosing regimen, confirms dosing.   Will follow up in 7 week(s).     Marielos Escoto, Pharmacy Intern

## 2021-06-08 ENCOUNTER — OFFICE VISIT (OUTPATIENT)
Dept: CARDIOLOGY | Facility: MEDICAL CENTER | Age: 81
End: 2021-06-08
Payer: MEDICARE

## 2021-06-08 VITALS
OXYGEN SATURATION: 98 % | DIASTOLIC BLOOD PRESSURE: 72 MMHG | SYSTOLIC BLOOD PRESSURE: 136 MMHG | RESPIRATION RATE: 14 BRPM | BODY MASS INDEX: 32.61 KG/M2 | WEIGHT: 191 LBS | HEART RATE: 64 BPM | HEIGHT: 64 IN

## 2021-06-08 DIAGNOSIS — J96.21 ACUTE ON CHRONIC RESPIRATORY FAILURE WITH HYPOXIA (HCC): ICD-10-CM

## 2021-06-08 DIAGNOSIS — E66.09 CLASS 1 OBESITY DUE TO EXCESS CALORIES WITH SERIOUS COMORBIDITY AND BODY MASS INDEX (BMI) OF 32.0 TO 32.9 IN ADULT: ICD-10-CM

## 2021-06-08 DIAGNOSIS — Z79.01 CHRONIC ANTICOAGULATION: ICD-10-CM

## 2021-06-08 DIAGNOSIS — G45.9 TRANSIENT CEREBRAL ISCHEMIA, UNSPECIFIED TYPE: ICD-10-CM

## 2021-06-08 DIAGNOSIS — G45.9 TIA (TRANSIENT ISCHEMIC ATTACK): ICD-10-CM

## 2021-06-08 DIAGNOSIS — J44.1 CHRONIC OBSTRUCTIVE PULMONARY DISEASE WITH ACUTE EXACERBATION (HCC): ICD-10-CM

## 2021-06-08 DIAGNOSIS — E78.5 DYSLIPIDEMIA, GOAL LDL BELOW 100: ICD-10-CM

## 2021-06-08 DIAGNOSIS — Z99.81 DEPENDENCE ON SUPPLEMENTAL OXYGEN: Chronic | ICD-10-CM

## 2021-06-08 DIAGNOSIS — Z87.891 FORMER TOBACCO USE: ICD-10-CM

## 2021-06-08 DIAGNOSIS — N18.1 STAGE 1 CHRONIC KIDNEY DISEASE: ICD-10-CM

## 2021-06-08 DIAGNOSIS — I48.0 PAROXYSMAL ATRIAL FIBRILLATION (HCC): ICD-10-CM

## 2021-06-08 DIAGNOSIS — I67.2 CEREBRAL ATHEROSCLEROSIS: ICD-10-CM

## 2021-06-08 DIAGNOSIS — I10 ESSENTIAL HYPERTENSION: ICD-10-CM

## 2021-06-08 PROBLEM — J69.0 ASPIRATION PNEUMONIA (HCC): Status: RESOLVED | Noted: 2021-05-08 | Resolved: 2021-06-08

## 2021-06-08 PROBLEM — R73.9 HYPERGLYCEMIA: Status: RESOLVED | Noted: 2021-05-08 | Resolved: 2021-06-08

## 2021-06-08 PROBLEM — J81.0 ACUTE PULMONARY EDEMA (HCC): Status: RESOLVED | Noted: 2021-05-08 | Resolved: 2021-06-08

## 2021-06-08 PROBLEM — A41.9 SEPSIS (HCC): Status: RESOLVED | Noted: 2021-05-08 | Resolved: 2021-06-08

## 2021-06-08 PROBLEM — E11.9 DIABETES MELLITUS (HCC): Status: RESOLVED | Noted: 2018-06-08 | Resolved: 2021-06-08

## 2021-06-08 PROBLEM — R09.81 NASAL CONGESTION: Status: RESOLVED | Noted: 2018-06-08 | Resolved: 2021-06-08

## 2021-06-08 PROBLEM — R06.00 DYSPNEA: Status: RESOLVED | Noted: 2018-06-08 | Resolved: 2021-06-08

## 2021-06-08 PROBLEM — D72.829 LEUKOCYTOSIS: Status: RESOLVED | Noted: 2021-05-08 | Resolved: 2021-06-08

## 2021-06-08 PROCEDURE — 99214 OFFICE O/P EST MOD 30 MIN: CPT | Performed by: NURSE PRACTITIONER

## 2021-06-08 PROCEDURE — 93000 ELECTROCARDIOGRAM COMPLETE: CPT | Performed by: INTERNAL MEDICINE

## 2021-06-08 RX ORDER — METOPROLOL SUCCINATE 100 MG/1
100 TABLET, EXTENDED RELEASE ORAL EVERY EVENING
Qty: 90 TABLET | Refills: 3 | COMMUNITY
Start: 2021-06-08 | End: 2022-05-27

## 2021-06-08 RX ORDER — SERTRALINE HYDROCHLORIDE 100 MG/1
50 TABLET, FILM COATED ORAL DAILY
Qty: 30 TABLET | Refills: 11 | COMMUNITY
Start: 2021-06-08

## 2021-06-08 ASSESSMENT — ENCOUNTER SYMPTOMS
PND: 0
FEVER: 0
PALPITATIONS: 0
FALLS: 1
CLAUDICATION: 0
SHORTNESS OF BREATH: 0
MYALGIAS: 0
ORTHOPNEA: 0
ABDOMINAL PAIN: 0
COUGH: 0
DIZZINESS: 0

## 2021-06-08 ASSESSMENT — FIBROSIS 4 INDEX: FIB4 SCORE: 1.85

## 2021-06-08 NOTE — PROGRESS NOTES
"Chief Complaint   Patient presents with   • Hypertension   • Dyslipidemia     F/V Dx: Dyslipidemia, goal LDL below 100   • Transient Ischemic Attack     Subjective:   Laurie Adams is a 80 y.o. female who presents today for hospital follow up S/P DCCV X2 with pulmonary edema.    She is a patient of Dr. Arita in our office.    Hx of obesity, paroxysmal afib on chronic anticoagulation, TIA, HTN, HLD, CKD stage I, depression, debility, hypothyroidism, former smoker, and COPD with supplemental O2.    She lives in [place], [place] with her .    She presents today alone and has had a few falls in the last couple weeks since discharge from the hospital. No lightheadedness but trips on things. She does feel a little weak at times.    She is tolerating her medications okay but is wondering if they should be adjusted.    She has no chest pain, shortness of breath, palpitations, or lower extremity edema.    She doesn't use her cane all the time.    She is on 2L daily oxygen. Refuses CPAP or pulmonary consultation at this time.    Past Medical History:   Diagnosis Date   • Arrhythmia 03/2015    A-fib takes flecainide   • Arthritis     fingers- osteo   • Breath shortness     02 @ 2L NOC   • Bronchial asthma 6/29/2012    Inhalers prn   • Bronchitis    • Cancer (HCC) 2005    squamous cell skin left chest   • Cataract     galo IOL   • Cerebral atherosclerosis    • Cold     Pt had \"head cold 6/1, surgery cristofer from 6/4 to 6/7, pt denies productive cough and SOB   • Dependence on supplemental oxygen     2 liters at night.   • Dizziness and giddiness     Episodic since ~2009   • Dyslipidemia, goal LDL below 100 6/29/2012   • High cholesterol    • History of tobacco use    • HTN (hypertension) 6/29/2012   • Obesity 6/29/2012   • Pneumonia     2015   • Stroke (HCC) 2010 2010-2017, multiple TIA's, generalized weakness   • TIA (transient ischemic attack) 6/29/2012   • Urinary incontinence      Past Surgical History: "   Procedure Laterality Date   • KNEE ARTHROPLASTY TOTAL Right 2018    Procedure: KNEE ARTHROPLASTY TOTAL;  Surgeon: Eric Bunn M.D.;  Location: SURGERY [facility];  Service: Orthopedics   • CATARACT PHACO WITH IOL  2013    Performed by Noe Jean M.D. at [facility]   • CATARACT PHACO WITH IOL  2013    Performed by Noe Jean M.D. at [facility]   • SEPTOPLASTY  2009    Performed by PABLITO LORENZANA at [facility]   • SOMNOPLASTY  2009    Performed by PABLITO LORENZANA at [facility]   • ANTROSTOMY  2009    Performed by PABLITO LORENZANA at [facility]   • ETHMOIDECTOMY  2009    Performed by [facility]O [facility] at [facility]   • OTHER      sinus   • COLON RESECTION     • CHOLECYSTECTOMY     • APPENDECTOMY     • OTHER      mastoid   • GANGLION EXCISION Bilateral ,     left wrist, right finger   • HYSTERECTOMY, VAGINAL     • MASTOIDECTOMY     • PB CHOLECYSTOENTEROSTOMY+VIVIANA-EN-Y     • PB NASAL SCOPY,REPB CSF LEAK,SPHENOID     • WRIST FUSION       History reviewed. No pertinent family history.  Social History     Socioeconomic History   • Marital status:      Spouse name: Not on file   • Number of children: Not on file   • Years of education: Not on file   • Highest education level: Not on file   Occupational History   • Not on file   Tobacco Use   • Smoking status: Former Smoker     Packs/day: 1.00     Years: 40.00     Pack years: 40.00     Types: Cigarettes     Quit date: 1999     Years since quittin.8   • Smokeless tobacco: Never Used   Vaping Use   • Vaping Use: Never used   Substance and Sexual Activity   • Alcohol use: No   • Drug use: No   • Sexual activity: Yes     Partners: Male   Other Topics Concern   • Not on file   Social History Narrative   • Not on file     Social Determinants of Health     Financial Resource Strain:    •  Difficulty of Paying Living Expenses:    Food Insecurity:    • Worried About Running Out of Food in the Last Year:    • Ran Out of Food in the Last Year:    Transportation Needs:    • Lack of Transportation (Medical):    • Lack of Transportation (Non-Medical):    Physical Activity:    • Days of Exercise per Week:    • Minutes of Exercise per Session:    Stress:    • Feeling of Stress :    Social Connections:    • Frequency of Communication with Friends and Family:    • Frequency of Social Gatherings with Friends and Family:    • Attends Zoroastrian Services:    • Active Member of Clubs or Organizations:    • Attends Club or Organization Meetings:    • Marital Status:    Intimate Partner Violence:    • Fear of Current or Ex-Partner:    • Emotionally Abused:    • Physically Abused:    • Sexually Abused:      Allergies   Allergen Reactions   • Feldene [Piroxicam] Photosensitivity   • Sulfa Drugs Hives   • Codeine Anxiety     Outpatient Encounter Medications as of 6/8/2021   Medication Sig Dispense Refill   • metoprolol SR (TOPROL XL) 100 MG TABLET SR 24 HR Take 1 tablet by mouth every evening. 90 tablet 3   • sertraline (ZOLOFT) 100 MG Tab Take 1.5 Tablets by mouth every day. 30 tablet 11   • atorvastatin (LIPITOR) 40 MG Tab Take 1 tablet by mouth once daily 90 tablet 3   • albuterol (VENTOLIN HFA) 108 (90 Base) MCG/ACT Aero Soln inhalation aerosol Inhale 2 Puffs every 6 hours as needed for Shortness of Breath. 18 g 0   • furosemide (LASIX) 40 MG Tab Take 1 tablet by mouth 1 time a day as needed (weight gain, swelling, shortness of breath). 30 tablet 2   • potassium chloride SA (KDUR) 20 MEQ Tab CR Take 1 tablet by mouth 1 time a day as needed (take with lasix only). 30 tablet 1   • losartan (COZAAR) 50 MG Tab Take 1 tablet by mouth every day. 30 tablet 0   • Biotin 1000 MCG Tab Take 1,000 mcg by mouth every day.     • Multiple Vitamins-Minerals (PRESERVISION AREDS 2) Cap Take 1 capsule by mouth every day.     •  "Cholecalciferol (VITAMIN D-3) 125 MCG (5000 UT) Tab Take 5,000 Units by mouth every day.     • Cyanocobalamin (VITAMIN B-12) 5000 MCG TABLET DISPERSIBLE Take 5,000 Units by mouth every day.     • flecainide (TAMBOCOR) 100 MG Tab Take 1 tablet by mouth 2 times a day. 60 tablet 11   • digoxin (LANOXIN) 125 MCG Tab Take 1 Tab by mouth every day. 30 Tab 11   • warfarin (COUMADIN) 5 MG Tab TAKE 1 TO 1 & 1/2 (ONE TO ONE & ONE-HALF) TABLETS BY MOUTH ONCE DAILY (Patient taking differently: Take 2.5-5 mg by mouth every day. Take 1 tablet = 5 mg every Mon, Wens, Thurs, Fri, Sat, Sun  Take 1/2 tablet = 2.5 mg every Tues.) 135 Tab 3   • [DISCONTINUED] metoprolol SR (TOPROL XL) 100 MG TABLET SR 24 HR Take 1 tablet by mouth every day. TAKE 1 TABLET BY MOUTH ONCE DAILY 90 tablet 3   • [DISCONTINUED] sertraline (ZOLOFT) 100 MG Tab Take 1 tablet by mouth every day. 30 tablet 0   • [DISCONTINUED] fluticasone (FLONASE) 50 MCG/ACT nasal spray Administer 1 Spray into affected nostril(S) 1 time a day as needed (allergys).     • [DISCONTINUED] diphenhydrAMINE HCl (BENADRYL PO) Take 1 tablet by mouth at bedtime as needed (sleep).     • [DISCONTINUED] Acetaminophen (TYLENOL PO) Take 1 tablet by mouth 1 time a day as needed (pain).       No facility-administered encounter medications on file as of 6/8/2021.     Review of Systems   Constitutional: Positive for malaise/fatigue. Negative for fever.   Respiratory: Negative for cough and shortness of breath.    Cardiovascular: Negative for chest pain, palpitations, orthopnea, claudication, leg swelling and PND.   Gastrointestinal: Negative for abdominal pain.   Musculoskeletal: Positive for falls. Negative for myalgias.   Neurological: Negative for dizziness.        Objective:   /72 (BP Location: Left arm, Patient Position: Sitting, BP Cuff Size: Adult)   Pulse 64   Resp 14   Ht 1.626 m (5' 4\")   Wt 86.6 kg (191 lb)   SpO2 98%   BMI 32.79 kg/m²     Physical Exam   Constitutional: She " is oriented to person, place, and time. She appears well-developed.   HENT:   Head: Normocephalic and atraumatic.   Neck: No JVD present.   Cardiovascular: Normal rate, regular rhythm, normal heart sounds and normal pulses.   Pulmonary/Chest: Effort normal and breath sounds normal.   Abdominal: Normal appearance.   Musculoskeletal:         General: Normal range of motion.   Neurological: She is alert and oriented to person, place, and time.   Skin: Skin is warm and dry. Capillary refill takes less than 2 seconds.   Psychiatric: Her behavior is normal. Mood, judgment and thought content normal.   Nursing note and vitals reviewed.      Assessment:     1. Paroxysmal atrial fibrillation (HCC)  EKG    EKG    metoprolol SR (TOPROL XL) 100 MG TABLET SR 24 HR    DIGOXIN    Comp Metabolic Panel   2. Dyslipidemia, goal LDL below 100     3. Essential hypertension     4. TIA (transient ischemic attack)     5. Class 1 obesity due to excess calories with serious comorbidity and body mass index (BMI) of 32.0 to 32.9 in adult     6. Former tobacco use     7. Chronic anticoagulation     8. Dependence on supplemental oxygen     9. Cerebral atherosclerosis     10. Chronic obstructive pulmonary disease with acute exacerbation (HCC)     11. Acute on chronic respiratory failure with hypoxia (HCC)     12. Stage 1 chronic kidney disease     13. Transient cerebral ischemia, unspecified type  metoprolol SR (TOPROL XL) 100 MG TABLET SR 24 HR     Medical Decision Making:  Today's Assessment / Status / Plan:     1. PAF  -EKG today shows SR  -discussed with Dr. Arita, OK to STOP digoxin  -cont flecainide 100 mg BID, toprol 100 mg QD but move to PM for lightheadedness with medications  -follow HR and BP at home  -cont coumadin per anticoagulation clinic     2. Acute on chronic respiratory failure, former smoker  -continue with home O2  -cont inhalers PRN  -refuses pulmonary or sleep medicine consult at this time    3. TIA  -no  deficits  -cont coumadin, statin  -follow    4. HTN  -good control on toprol  -furosemide, K PRN for weight gain or LE edema    5. CKD stage I  -follow labs, repeat bmp before next apt    6. Obesity  -work on calorie reduction    Patient is to follow up with Dr. Arita in 3 months with labs.

## 2021-06-08 NOTE — PATIENT INSTRUCTIONS
Move metoprolol to the evening.    Continue your heart medications. Repeat labs in 1 month-non fasting.    We will see you back in 3 months.    Call if you have any concerns before then.

## 2021-06-09 LAB — EKG IMPRESSION: NORMAL

## 2021-07-01 ENCOUNTER — NON-PROVIDER VISIT (OUTPATIENT)
Dept: SLEEP MEDICINE | Facility: MEDICAL CENTER | Age: 81
End: 2021-07-01
Attending: PHYSICIAN ASSISTANT
Payer: MEDICARE

## 2021-07-01 ENCOUNTER — OFFICE VISIT (OUTPATIENT)
Dept: SLEEP MEDICINE | Facility: MEDICAL CENTER | Age: 81
End: 2021-07-01
Payer: MEDICARE

## 2021-07-01 VITALS — HEIGHT: 63 IN | BODY MASS INDEX: 33.84 KG/M2 | WEIGHT: 191 LBS

## 2021-07-01 VITALS
HEIGHT: 63 IN | SYSTOLIC BLOOD PRESSURE: 120 MMHG | RESPIRATION RATE: 16 BRPM | WEIGHT: 191 LBS | OXYGEN SATURATION: 95 % | HEART RATE: 63 BPM | DIASTOLIC BLOOD PRESSURE: 80 MMHG | BODY MASS INDEX: 33.84 KG/M2

## 2021-07-01 DIAGNOSIS — J44.1 CHRONIC OBSTRUCTIVE PULMONARY DISEASE WITH ACUTE EXACERBATION (HCC): ICD-10-CM

## 2021-07-01 DIAGNOSIS — J96.11 CHRONIC RESPIRATORY FAILURE WITH HYPOXIA (HCC): ICD-10-CM

## 2021-07-01 DIAGNOSIS — Z87.891 FORMER SMOKER: ICD-10-CM

## 2021-07-01 PROCEDURE — 94729 DIFFUSING CAPACITY: CPT | Performed by: INTERNAL MEDICINE

## 2021-07-01 PROCEDURE — 94726 PLETHYSMOGRAPHY LUNG VOLUMES: CPT | Performed by: INTERNAL MEDICINE

## 2021-07-01 PROCEDURE — 94010 BREATHING CAPACITY TEST: CPT | Performed by: INTERNAL MEDICINE

## 2021-07-01 PROCEDURE — 94761 N-INVAS EAR/PLS OXIMETRY MLT: CPT | Performed by: PHYSICIAN ASSISTANT

## 2021-07-01 PROCEDURE — 99214 OFFICE O/P EST MOD 30 MIN: CPT | Performed by: PHYSICIAN ASSISTANT

## 2021-07-01 RX ORDER — TIOTROPIUM BROMIDE AND OLODATEROL 3.124; 2.736 UG/1; UG/1
2 SPRAY, METERED RESPIRATORY (INHALATION) DAILY
Qty: 1 EACH | Refills: 0 | COMMUNITY
Start: 2021-07-01 | End: 2021-08-31

## 2021-07-01 RX ORDER — TIOTROPIUM BROMIDE AND OLODATEROL 3.124; 2.736 UG/1; UG/1
2 SPRAY, METERED RESPIRATORY (INHALATION) DAILY
Qty: 1 EACH | Refills: 11 | Status: SHIPPED
Start: 2021-07-01 | End: 2022-06-14

## 2021-07-01 ASSESSMENT — PULMONARY FUNCTION TESTS
FEV1/FVC: 65
FEV1/FVC_PERCENT_PREDICTED: 76
FEV1_PERCENT_PREDICTED: 69
FEV1/FVC: 65
FEV1_LLN: 1.54
FEV1_PREDICTED: 1.84
FEV1/FVC_PERCENT_PREDICTED: 85
FEV1: 1.29
FVC_PERCENT_PREDICTED: 81
FEV1/FVC_PERCENT_PREDICTED: 84
FVC_LLN: 2.03
FVC: 1.98
FEV1/FVC_PERCENT_LLN: 64
FEV1/FVC_PREDICTED: 77
FVC_PREDICTED: 2.43

## 2021-07-01 ASSESSMENT — FIBROSIS 4 INDEX
FIB4 SCORE: 1.85
FIB4 SCORE: 1.85

## 2021-07-01 NOTE — PROGRESS NOTES
CC: Follow-up COPD and PFT results    HPI:  Laurie Adams is a 80 y.o. year old female here today for follow-up on COPD and pulmonary function test results. Last seen in clinic 5/17/2021.  Patient is a former smoker with reported quit date 1999 and 40-pack-year history, reports significant secondhand exposure in childhood as well.    Pertinent past medical history includes hospital admission for 6 days on 5/7/2021 for acute CHF exacerbation post cardioversion for A. fib, patient was air transported from [facility].  Other history includes arthritis, bronchial asthma, bronchitis, skin cancer, multiple TIAs, PSVT, aspiration pneumonia, sepsis, acute on chronic respiratory failure, nasal sinus surgery.  Reports history of falls but none recently.    Reviewed in clinic vitals including /80, HR 63, O2 sat 95%. Patient's body mass index is 33.83 kg/m². Exercise and nutrition counseling were performed at this visit.    Reviewed home medication regimen including albuterol, warfarin, metoprolol, Lasix. Patient reports using her albuterol inhaler once in the last 3 weeks.    Reviewed most recent imaging including chest x-ray obtained 5/12/2021 demonstrating pulmonary edema, cardiomegaly, atherosclerosis.     Echocardiogram was performed 5/7/2021 demonstrating normal left ventricular chamber size, wall thickness and systolic function.  LVEF estimated 65%, diastolic function difficult to assess due to arrhythmia.  Normal right ventricular size and systolic function, enlarged right atrium, severely dilated left atrium, trace mitral regurgitation, moderate tricuspid regurgitation with estimated RVSP of 46 mmHg.      CTA obtained 5/8/2021, demonstrated patchy bibasilar opacities, interlobular septal thickening and hazy groundglass opacity throughout both lungs greater in the dependent portion of the right upper lobe, mild to moderate bilateral pleural effusions, enlarged left atrium, no evidence of pulmonary  "emboli.     Pulmonary function testing obtained 7/1/2021 demonstrated an FEV1 of 1.29 L or 69% predicted, FVC of 1.98 L or 81% predicted, FEV1/FVC ratio of 65, residual volume 138% predicted, % predicted, DLCO 79% predicted.  Per pulmonologist interpretation moderately severe COPD with FEV1 of 69% predicted, hyperinflation and air trapping consistent with obstructive lung disease and mild diffusion impairment consistent with emphysema.    Review of Systems   Constitutional: Positive for malaise/fatigue (mild ). Negative for chills, fever and weight loss.   HENT: Positive for congestion (allergies) and tinnitus (rare ). Negative for hearing loss, nosebleeds, sinus pain and sore throat.    Eyes:        Presc glasses   Respiratory: Positive for cough, sputum production (yellow tan about once per day ) and wheezing (occasional, mild ). Negative for shortness of breath.    Cardiovascular: Negative for chest pain, palpitations and orthopnea.   Gastrointestinal: Negative for heartburn.        No dentures, missing a few teeth, some difficulty swallowing    Neurological: Positive for dizziness (associated with atrial fib) and headaches (dull ). Negative for tremors.   Psychiatric/Behavioral: The patient has insomnia (occasional falling asleep ).        Past Medical History:   Diagnosis Date   • Arrhythmia 03/2015    A-fib takes flecainide   • Arthritis     fingers- osteo   • Breath shortness     02 @ 2L NOC   • Bronchial asthma 6/29/2012    Inhalers prn   • Bronchitis    • Cancer (HCC) 2005    squamous cell skin left chest   • Cataract     galo IOL   • Cerebral atherosclerosis    • Cold     Pt had \"head cold 6/1, surgery cristofer from 6/4 to 6/7, pt denies productive cough and SOB   • Dependence on supplemental oxygen     2 liters at night.   • Dizziness and giddiness     Episodic since ~2009   • Dyslipidemia, goal LDL below 100 6/29/2012   • High cholesterol    • History of tobacco use    • HTN (hypertension) 6/29/2012 "   • Obesity 2012   • Pneumonia     2015   • Stroke (HCC) 2010    [id number], multiple TIA's, generalized weakness   • TIA (transient ischemic attack) 2012   • Urinary incontinence        Past Surgical History:   Procedure Laterality Date   • KNEE ARTHROPLASTY TOTAL Right 2018    Procedure: KNEE ARTHROPLASTY TOTAL;  Surgeon: Eric Bunn M.D.;  Location: SURGERY [facility];  Service: Orthopedics   • CATARACT PHACO WITH IOL  2013    Performed by Noe Jean M.D. at [facility]   • CATARACT PHACO WITH IOL  2013    Performed by Noe Jean M.D. at [facility]   • SEPTOPLASTY  2009    Performed by PABLITO LORENZANA at SUR[facility]   • SOMNOPLASTY  2009    Performed by PABLITO LORENZANA at [facility]   • ANTROSTOMY  2009    Performed by PABLITO LORENZANA at [facility]   • ETHMOIDECTOMY  2009    Performed by PABLITO LORENZANA at [facility]   • OTHER      sinus   • COLON RESECTION     • CHOLECYSTECTOMY     • APPENDECTOMY     • OTHER      mastoid   • GANGLION EXCISION Bilateral ,     left wrist, right finger   • HYSTERECTOMY, VAGINAL     • MASTOIDECTOMY     • PB CHOLECYSTOENTEROSTOMY+VIVIANA-EN-Y     • PB NASAL SCOPY,REPB CSF LEAK,SPHENOID     • WRIST FUSION         History reviewed. No pertinent family history.    Social History     Socioeconomic History   • Marital status:      Spouse name: Not on file   • Number of children: Not on file   • Years of education: Not on file   • Highest education level: Not on file   Occupational History   • Not on file   Tobacco Use   • Smoking status: Former Smoker     Packs/day: 1.00     Years: 40.00     Pack years: 40.00     Types: Cigarettes     Quit date: 1999     Years since quittin.8   • Smokeless tobacco: Never Used   Vaping Use   • Vaping Use: Never used   Substance and Sexual Activity   • Alcohol  "use: No   • Drug use: No   • Sexual activity: Yes     Partners: Male   Other Topics Concern   • Not on file   Social History Narrative   • Not on file     Social Determinants of Health     Financial Resource Strain:    • Difficulty of Paying Living Expenses:    Food Insecurity:    • Worried About Running Out of Food in the Last Year:    • Ran Out of Food in the Last Year:    Transportation Needs:    • Lack of Transportation (Medical):    • Lack of Transportation (Non-Medical):    Physical Activity:    • Days of Exercise per Week:    • Minutes of Exercise per Session:    Stress:    • Feeling of Stress :    Social Connections:    • Frequency of Communication with Friends and Family:    • Frequency of Social Gatherings with Friends and Family:    • Attends Jew Services:    • Active Member of Clubs or Organizations:    • Attends Club or Organization Meetings:    • Marital Status:    Intimate Partner Violence:    • Fear of Current or Ex-Partner:    • Emotionally Abused:    • Physically Abused:    • Sexually Abused:        Allergies as of 07/01/2021 - Reviewed 07/01/2021   Allergen Reaction Noted   • Feldene [piroxicam] Photosensitivity 08/13/2009   • Sulfa drugs Hives 08/13/2009   • Codeine Anxiety 08/13/2009        @Vital signs for this encounter:  Vitals:    07/01/21 1039   Height: 1.6 m (5' 3\")   Weight: 86.6 kg (191 lb)   Weight % change since last entry.: 0 %   BP: 120/80   Pulse: 63   BMI (Calculated): 33.83   Resp: 16       Current medications as of today   Current Outpatient Medications   Medication Sig Dispense Refill   • metoprolol SR (TOPROL XL) 100 MG TABLET SR 24 HR Take 1 tablet by mouth every evening. 90 tablet 3   • sertraline (ZOLOFT) 100 MG Tab Take 1.5 Tablets by mouth every day. 30 tablet 11   • atorvastatin (LIPITOR) 40 MG Tab Take 1 tablet by mouth once daily 90 tablet 3   • albuterol (VENTOLIN HFA) 108 (90 Base) MCG/ACT Aero Soln inhalation aerosol Inhale 2 Puffs every 6 hours as needed for " Shortness of Breath. 18 g 0   • furosemide (LASIX) 40 MG Tab Take 1 tablet by mouth 1 time a day as needed (weight gain, swelling, shortness of breath). 30 tablet 2   • potassium chloride SA (KDUR) 20 MEQ Tab CR Take 1 tablet by mouth 1 time a day as needed (take with lasix only). 30 tablet 1   • losartan (COZAAR) 50 MG Tab Take 1 tablet by mouth every day. 30 tablet 0   • Biotin 1000 MCG Tab Take 1,000 mcg by mouth every day.     • Multiple Vitamins-Minerals (PRESERVISION AREDS 2) Cap Take 1 capsule by mouth every day.     • Cholecalciferol (VITAMIN D-3) 125 MCG (5000 UT) Tab Take 5,000 Units by mouth every day.     • Cyanocobalamin (VITAMIN B-12) 5000 MCG TABLET DISPERSIBLE Take 5,000 Units by mouth every day.     • flecainide (TAMBOCOR) 100 MG Tab Take 1 tablet by mouth 2 times a day. 60 tablet 11   • warfarin (COUMADIN) 5 MG Tab TAKE 1 TO 1 & 1/2 (ONE TO ONE & ONE-HALF) TABLETS BY MOUTH ONCE DAILY (Patient taking differently: Take 2.5-5 mg by mouth every day. Take 1 tablet = 5 mg every Mon, Wens, Thurs, Fri, Sat, Sun  Take 1/2 tablet = 2.5 mg every Tues.) 135 Tab 3     No current facility-administered medications for this visit.         Physical Exam:   Gen:           Alert and oriented, No apparent distress. Mood and affect appropriate, normal interaction with provider.  Eyes:          sclere white, conjunctive moist.  Hearing:     Grossly intact.  Dentition:    Poor dentition.  Oropharynx:   Tongue normal, posterior pharynx without erythema or exudate.  Neck:        Supple, trachea midline, no masses.  Respiratory Effort: No intercostal retractions or use of accessory muscles.   Lung Auscultation:      Diminished; no rales, rhonchi or wheezing.  CV:            Regular rate and rhythm. No edema. No murmurs, rubs or gallops.  Digits, Nails, Ext: No clubbing, cyanosis, petechiae, or nodes.   Skin:        No rashes, lesions or ulcers noted on exposed skin surfaces.                     Assessment:  1. Chronic  obstructive pulmonary disease with acute exacerbation (HCC)  Tiotropium Bromide-Olodaterol (STIOLTO RESPIMAT) 2.5-2.5 MCG/ACT Aero Soln    Tiotropium Bromide-Olodaterol (STIOLTO RESPIMAT) 2.5-2.5 MCG/ACT Aero Soln    Multiple Oximetry    Multiple Oximetry   2. Chronic respiratory failure with hypoxia (HCC)     3. Former smoker     4. BMI 33.0-33.9,adult         Immunizations:    Flu: 9/7/2017  Pneumovax 23: Deferred  Prevnar 13: 9/23/2015    Plan:   80 y.o. year old female here today for follow-up on COPD and pulmonary function test results. Last seen in clinic 5/17/2021.  Patient is a former smoker with reported quit date 1999 and 40-pack-year history, reports significant secondhand exposure in childhood as well.    Former smoker: Patient remains abstinent of tobacco.    Pertinent past medical history includes hospital admission for 6 days on 5/7/2021 for acute CHF exacerbation post cardioversion for A. fib, patient was air transported from [facility].  Other history includes arthritis, bronchial asthma, bronchitis, skin cancer, multiple TIAs, PSVT, aspiration pneumonia, sepsis, acute on chronic respiratory failure, nasal sinus surgery.  Reports history of falls but none recently.    Reviewed in clinic vitals including /80, HR 63, O2 sat 95% on 2 L. Patient's body mass index is 33.83 kg/m².  Ambulating multi ox demonstrated continued need for oxygen at 2 L ATC.    Elevated BMI:Exercise and nutrition counseling were performed at this visit.  Discussion included impact of central adiposity on pulmonary function.    Chronic respiratory failure with hypoxia: Demonstrated continued need for oxygen and 2 L ATC in clinic.    Reviewed home medication regimen including albuterol, warfarin, metoprolol, Lasix. Patient reports using her albuterol inhaler once in the last 3 weeks.    COPD: Reviewed pulmonary function test results with patient.  Trial maintenance inhaler, assess for benefit.  Sample provided as well as  prescription.    Declines Covid vaccination.  Follow-up in 3 months, sooner if needed.    This dictation was created using voice recognition software. The accuracy of the dictation is limited to the abilities of the software. I expect there may be some errors of grammar and possibly content.

## 2021-07-01 NOTE — PATIENT INSTRUCTIONS
1-reviewed indications for albuterol use   -increased cough   -increased work of breathing   -shortness of breath  2-reviewed PFT  3-trial Stiolto  4-follow up in 3 months

## 2021-07-01 NOTE — PROCEDURES
Tech: Jayde Hill, RRT, CPFT  Tech notes: Good patient effort & cooperation.  The results of this test meet the ATS/ERS standards for acceptability & reproducibility.  Test was performed on the [employer] Body Plethysmograph-Elite DX system.  Predicted values were GLI-2012 for spirometry, GLI- 2017 for DLCO, ITS for Lung Volumes.  The DLCO was uncorrected for Hgb.    Interpretation:  Moderately severe COPD, FEV1 1.29 L or 69%.  Hyperinflation and air trapping consistent with obstructive lung disease.  Mild diffusion impairment with emphysema.

## 2021-07-01 NOTE — PROCEDURES
Multi-Ox Readings  Multi Ox #1 Room air   O2 sat % at rest 95   O2 sat % on exertion 85   O2 sat average on exertion     Multi Ox #2 2 LPM   O2 sat % at rest 95   O2 sat % on exertion 92   O2 sat average on exertion       Oxygen Use 2   Oxygen Frequency 24/7   Duration of need     Is the patient mobile within the home?     CPAP Use?     BIPAP Use?     Servo Titration

## 2021-07-26 ENCOUNTER — ANTICOAGULATION MONITORING (OUTPATIENT)
Dept: VASCULAR LAB | Facility: MEDICAL CENTER | Age: 81
End: 2021-07-26

## 2021-07-26 DIAGNOSIS — I48.91 ATRIAL FIBRILLATION, UNSPECIFIED TYPE (HCC): ICD-10-CM

## 2021-07-26 DIAGNOSIS — G45.9 TIA (TRANSIENT ISCHEMIC ATTACK): ICD-10-CM

## 2021-07-26 LAB — INR PPP: 2.2 (ref 2–3.5)

## 2021-07-26 NOTE — PROGRESS NOTES
Anticoagulation Summary  As of 2021    INR goal:  2.0-3.0   TTR:  74.7 % (3.7 y)   INR used for dosin.20 (2021)   Warfarin maintenance plan:  2.5 mg (5 mg x 0.5) every Tue; 5 mg (5 mg x 1) all other days   Weekly warfarin total:  32.5 mg   Plan last modified:  Deb Pandya (2020)   Next INR check:  2021   Target end date:  Indefinite    Indications    TIA (transient ischemic attack) [G45.9]  Atrial fibrillation (CMS-HCC) [I48.91] [I48.91]             Anticoagulation Episode Summary     INR check location:  Outside Lab    Preferred lab:      Send INR reminders to:      Comments:  [facility]      Anticoagulation Care Providers     Provider Role Specialty Phone number    Narciso Contreras M.D.  Cardiology [phone]    Lynne Doyle A.P.N. Responsible Cardiology [phone]    JEANNA Barton.P.NShaan Responsible Cardiology [phone]    [facility] Anticoagulation Services Responsible  [phone]        Anticoagulation Patient Findings  Patient Findings     Positives:  Change in medications (changes, but nothing significant that interacts with warfarin)    Negatives:  Signs/symptoms of thrombosis, Signs/symptoms of bleeding, Laboratory test error suspected, Change in health, Change in alcohol use, Change in activity, Upcoming invasive procedure, Emergency department visit, Upcoming dental procedure, Missed doses, Extra doses, Change in diet/appetite, Hospital admission, Bruising, Other complaints         Spoke with patient today regarding therapeutic INR of 2.2.  Patient denies any signs/symptoms of bruising or bleeding or any changes in diet and medications.  Instructed patient to call clinic with any questions or concerns.    Pt is to continue with current warfarin dosing regimen.    Follow up in 8 weeks, to reduce risk of adverse events related to this high risk medication,  Warfarin.    Rehana Hernandez, Pharmacy Intern

## 2021-08-30 ENCOUNTER — TELEPHONE (OUTPATIENT)
Dept: CARDIOLOGY | Facility: MEDICAL CENTER | Age: 81
End: 2021-08-30

## 2021-08-30 NOTE — TELEPHONE ENCOUNTER
LVM for pt in regards to labs that were ordered by [facility] at last visit. Per chart review outside records were received from  [facility]. Called office and confirmed last set of labs pt had done were from 6-30-21. Fax sent requesting labs.     Pending lab records.    Pending call back from pt.

## 2021-08-31 ENCOUNTER — OFFICE VISIT (OUTPATIENT)
Dept: CARDIOLOGY | Facility: MEDICAL CENTER | Age: 81
End: 2021-08-31
Payer: MEDICARE

## 2021-08-31 VITALS
RESPIRATION RATE: 14 BRPM | DIASTOLIC BLOOD PRESSURE: 74 MMHG | OXYGEN SATURATION: 94 % | WEIGHT: 194 LBS | HEART RATE: 58 BPM | HEIGHT: 63 IN | BODY MASS INDEX: 34.38 KG/M2 | SYSTOLIC BLOOD PRESSURE: 120 MMHG

## 2021-08-31 DIAGNOSIS — I48.19 PERSISTENT ATRIAL FIBRILLATION (HCC): ICD-10-CM

## 2021-08-31 LAB — EKG IMPRESSION: NORMAL

## 2021-08-31 PROCEDURE — 93000 ELECTROCARDIOGRAM COMPLETE: CPT | Performed by: INTERNAL MEDICINE

## 2021-08-31 PROCEDURE — 99214 OFFICE O/P EST MOD 30 MIN: CPT | Performed by: INTERNAL MEDICINE

## 2021-08-31 RX ORDER — HYDROCODONE BITARTRATE AND ACETAMINOPHEN 5; 325 MG/1; MG/1
1 TABLET ORAL EVERY 6 HOURS PRN
COMMUNITY
Start: 2021-06-22 | End: 2022-06-14

## 2021-08-31 ASSESSMENT — FIBROSIS 4 INDEX: FIB4 SCORE: 1.85

## 2021-08-31 NOTE — PROGRESS NOTES
"      Cardiology Follow-up Consultation Note    Date of note:    8/31/2021    Primary Care Provider: Nohelia Leong P.A.-C.    Name:             Laurie Adams   YOB: 1940  MRN:               [id number]    CC: Follow up Afib,COPD, diastolic heart failure    Patient ID/HPI:   80-year-old female patient with history of atrial fibrillation recent cardioversion here for follow-up.  Unfortunately after cardioversion she developed respiratory distress was in the hospital for couple days.  She was discharged on oxygen.  She has COPD, prior smoking history, her PFTs showed moderate to severe COPD FEV1 69%.  She feels well currently, uses oxygen most of the time.  Fatigue is improved.  She does not feel atrial fibrillation.      Past Medical History:   Diagnosis Date   • Arrhythmia 03/2015    A-fib takes flecainide   • Arthritis     fingers- osteo   • Breath shortness     02 @ 2L NOC   • Bronchial asthma 6/29/2012    Inhalers prn   • Bronchitis    • Cancer (HCC) 2005    squamous cell skin left chest   • Cataract     galo IOL   • Cerebral atherosclerosis    • Cold     Pt had \"head cold 6/1, surgery cristofer from 6/4 to 6/7, pt denies productive cough and SOB   • Dependence on supplemental oxygen     2 liters at night.   • Dizziness and giddiness     Episodic since ~2009   • Dyslipidemia, goal LDL below 100 6/29/2012   • High cholesterol    • History of tobacco use    • HTN (hypertension) 6/29/2012   • Obesity 6/29/2012   • Pneumonia     2015   • Stroke (HCC) 2010 2010-2017, multiple TIA's, generalized weakness   • TIA (transient ischemic attack) 6/29/2012   • Urinary incontinence          Past Surgical History:   Procedure Laterality Date   • KNEE ARTHROPLASTY TOTAL Right 6/7/2018    Procedure: KNEE ARTHROPLASTY TOTAL;  Surgeon: Eric Bunn M.D.;  Location: SURGERY [facility];  Service: Orthopedics   • CATARACT PHACO WITH IOL  6/4/2013    Performed by Noe Jean M.D. at SURGERY SURGICAL " [facility] ORS   • CATARACT PHACO WITH IOL  5/21/2013    Performed by Noe Jean M.D. at SURGERY SURGICAL [facility] ORS   • SEPTOPLASTY  8/19/2009    Performed by PABLITO LORENZANA at SURGERY [facility] ORS   • SOMNOPLASTY  8/19/2009    Performed by PABLITO LORENZANA at SURGERY [facility] ORS   • ANTROSTOMY  8/19/2009    Performed by PABLITO LORENZANA at SURGERY [facility] ORS   • ETHMOIDECTOMY  8/19/2009    Performed by PABLITO LORENZANA at SURGERY [facility] ORS   • OTHER  2008    sinus   • COLON RESECTION  2007   • CHOLECYSTECTOMY  1988   • APPENDECTOMY  1954   • OTHER  1943    mastoid   • GANGLION EXCISION Bilateral 1970, 1975    left wrist, right finger   • HYSTERECTOMY, VAGINAL     • MASTOIDECTOMY     • PB CHOLECYSTOENTEROSTOMY+VIVIANA-EN-Y     • PB NASAL SCOPY,REPB CSF LEAK,SPHENOID     • WRIST FUSION           Current Outpatient Medications   Medication Sig Dispense Refill   • metoprolol SR (TOPROL XL) 100 MG TABLET SR 24 HR Take 1 tablet by mouth every evening. 90 tablet 3   • sertraline (ZOLOFT) 100 MG Tab Take 1.5 Tablets by mouth every day. 30 tablet 11   • atorvastatin (LIPITOR) 40 MG Tab Take 1 tablet by mouth once daily 90 tablet 3   • albuterol (VENTOLIN HFA) 108 (90 Base) MCG/ACT Aero Soln inhalation aerosol Inhale 2 Puffs every 6 hours as needed for Shortness of Breath. 18 g 0   • furosemide (LASIX) 40 MG Tab Take 1 tablet by mouth 1 time a day as needed (weight gain, swelling, shortness of breath). 30 tablet 2   • potassium chloride SA (KDUR) 20 MEQ Tab CR Take 1 tablet by mouth 1 time a day as needed (take with lasix only). 30 tablet 1   • losartan (COZAAR) 50 MG Tab Take 1 tablet by mouth every day. 30 tablet 0   • Biotin 1000 MCG Tab Take 1,000 mcg by mouth every day.     • Multiple Vitamins-Minerals (PRESERVISION AREDS 2) Cap Take 1 capsule by mouth every day.     • Cholecalciferol (VITAMIN D-3) 125 MCG (5000 UT) Tab Take 5,000 Units by mouth every day.     • Cyanocobalamin (VITAMIN B-12) 5000  MCG TABLET DISPERSIBLE Take 5,000 Units by mouth every day.     • flecainide (TAMBOCOR) 100 MG Tab Take 1 tablet by mouth 2 times a day. 60 tablet 11   • warfarin (COUMADIN) 5 MG Tab TAKE 1 TO 1 & 1/2 (ONE TO ONE & ONE-HALF) TABLETS BY MOUTH ONCE DAILY (Patient taking differently: Take 2.5-5 mg by mouth every day. Take 1 tablet = 5 mg every Mon, Wens, Thurs, Fri, Sat, Sun  Take 1/2 tablet = 2.5 mg every Tu.) 135 Tab 3   • Tiotropium Bromide-Olodaterol (STIOLTO RESPIMAT) 2.5-2.5 MCG/ACT Aero Soln Take 2 Puffs by mouth every day. (Patient not taking: Reported on 2021) 1 Each 11     No current facility-administered medications for this visit.         Allergies   Allergen Reactions   • Feldene [Piroxicam] Photosensitivity   • Sulfa Drugs Hives   • Codeine Anxiety         History reviewed. No pertinent family history.      Social History     Socioeconomic History   • Marital status:      Spouse name: Not on file   • Number of children: Not on file   • Years of education: Not on file   • Highest education level: Not on file   Occupational History   • Not on file   Tobacco Use   • Smoking status: Former Smoker     Packs/day: 1.00     Years: 40.00     Pack years: 40.00     Types: Cigarettes     Quit date: 1999     Years since quittin.0   • Smokeless tobacco: Never Used   Vaping Use   • Vaping Use: Never used   Substance and Sexual Activity   • Alcohol use: No   • Drug use: No   • Sexual activity: Yes     Partners: Male   Other Topics Concern   • Not on file   Social History Narrative   • Not on file     Social Determinants of Health     Financial Resource Strain:    • Difficulty of Paying Living Expenses:    Food Insecurity:    • Worried About Running Out of Food in the Last Year:    • Ran Out of Food in the Last Year:    Transportation Needs:    • Lack of Transportation (Medical):    • Lack of Transportation (Non-Medical):    Physical Activity:    • Days of Exercise per Week:    • Minutes of  "Exercise per Session:    Stress:    • Feeling of Stress :    Social Connections:    • Frequency of Communication with Friends and Family:    • Frequency of Social Gatherings with Friends and Family:    • Attends [employer] Services:    • Active Member of Clubs or Organizations:    • Attends Club or Organization Meetings:    • Marital Status:    Intimate Partner Violence:    • Fear of Current or Ex-Partner:    • Emotionally Abused:    • Physically Abused:    • Sexually Abused:          Physical Exam:  Ambulatory Vitals  /74 (BP Location: Left arm, Patient Position: Sitting, BP Cuff Size: Adult)   Pulse (!) 58   Resp 14   Ht 1.6 m (5' 3\")   Wt 88 kg (194 lb)   SpO2 94%    Oxygen Therapy:  Pulse Oximetry: 94 %, O2 Delivery Device: Nasal Cannula  BP Readings from Last 4 Encounters:   08/31/21 120/74   07/01/21 120/80   06/08/21 136/72   05/17/21 124/80       Weight/BMI: Body mass index is 34.37 kg/m².  Wt Readings from Last 4 Encounters:   08/31/21 88 kg (194 lb)   07/01/21 86.6 kg (191 lb)   07/01/21 86.6 kg (191 lb)   06/08/21 86.6 kg (191 lb)       General: Well appearing and in no apparent distress  Head: atrumatic  Eyes: No conjunctival pallor   ENT: normal external appearance of nose and ears  Neck: JVD absent, carotid bruits absent  Lungs: respiratory sounds  normal, additional breath sounds absent  Heart: Regular rhythm,   No palpable thrills on palpation, murmurs absent, no rubs,   Lower extremity edema absent.   Abdomen: soft, non tender, non distended.  Extremities/MSK: no clubbing, no cyanosis  Neurological: normal orientation, Gait normal   Psychiatric: Appropriate affect, intact judgement and insight  Skin: Warm extremities        Lab Data Review:  No results found for: CHOLSTRLTOT, LDL, HDL, TRIGLYCERIDE    Lab Results   Component Value Date/Time    SODIUM 141 05/11/2021 04:31 AM    POTASSIUM 4.0 05/11/2021 04:31 AM    CHLORIDE 99 05/11/2021 04:31 AM    CO2 32 05/11/2021 04:31 AM    GLUCOSE 130 " (H) 05/11/2021 04:31 AM    BUN 31 (H) 05/11/2021 04:31 AM    CREATININE 0.95 05/11/2021 04:31 AM    CREATININE 0.8 12/11/2007 09:45 AM     Lab Results   Component Value Date/Time    ALKPHOSPHAT 83 05/09/2021 02:09 AM    ASTSGOT 24 05/09/2021 02:09 AM    ALTSGPT 14 05/09/2021 02:09 AM    TBILIRUBIN 0.5 05/09/2021 02:09 AM      Lab Results   Component Value Date/Time    WBC 12.7 (H) 05/11/2021 04:31 AM     Echo 5/8/21: Reviewed, personally interpreted  CONCLUSIONS  No prior study is available for comparison.   Normal left ventricular systolic function.  Left ventricular ejection fraction is visually estimated to be 65%.  Normal right ventricular size and systolic function.  Moderate tricuspid regurgitation. Estimated right ventricular systolic   pressure is 46  mmHg.  Biatrial enlargement.    Hospital notes, discharge summary reviewed 5/7/2021    Impression and Plan:  80-year-old female patient with moderate to severe COPD, persistent atrial fibrillation, hypertension, dyslipidemia here for follow-up.  Heart rhythm is stable, sinus on the EKG today.  Continue flecainide, metoprolol, warfarin.  Her saturation dropped to 88 with 1 minute walking, continue oxygen with ambulation.  Lungs are clear, no edema oxygen requirement likely due to COPD not heart failure.  Continue Lasix as needed.  Continue Lipitor.    Return in about 6 months (around 2/28/2022).      Russel RUTHERFORD  Interventional cardiologist  [facility]  [address] [address]  Phone: [phone]  Fax: [phone]

## 2021-09-10 DIAGNOSIS — I10 ESSENTIAL HYPERTENSION: ICD-10-CM

## 2021-09-10 RX ORDER — LOSARTAN POTASSIUM 50 MG/1
50 TABLET ORAL DAILY
Qty: 90 TABLET | Refills: 3 | Status: SHIPPED | OUTPATIENT
Start: 2021-09-10 | End: 2022-09-12

## 2021-09-20 LAB — INR PPP: 1.8 (ref 2–3.5)

## 2021-09-21 ENCOUNTER — ANTICOAGULATION MONITORING (OUTPATIENT)
Dept: VASCULAR LAB | Facility: MEDICAL CENTER | Age: 81
End: 2021-09-21

## 2021-09-21 DIAGNOSIS — I48.91 ATRIAL FIBRILLATION, UNSPECIFIED TYPE (HCC): ICD-10-CM

## 2021-09-21 DIAGNOSIS — G45.9 TIA (TRANSIENT ISCHEMIC ATTACK): ICD-10-CM

## 2021-09-21 NOTE — PROGRESS NOTES
OP Anticoagulation Service Note    Date: 2021    Anticoagulation Summary  As of 2021    INR goal:  2.0-3.0   TTR:  73.7 % (3.8 y)   INR used for dosin.80 (2021)   Warfarin maintenance plan:  2.5 mg (5 mg x 0.5) every Tue; 5 mg (5 mg x 1) all other days   Weekly warfarin total:  32.5 mg   Plan last modified:  Deb Pandya (2020)   Next INR check:  10/19/2021   Target end date:  Indefinite    Indications    TIA (transient ischemic attack) [G45.9]  Atrial fibrillation (CMS-HCC) [I48.91] [I48.91]             Anticoagulation Episode Summary     INR check location:  Outside Lab    Preferred lab:      Send INR reminders to:      Comments:  [facility]      Anticoagulation Care Providers     Provider Role Specialty Phone number    Narciso Contreras M.D.  Cardiovascular Disease (Cardiology) [phone]    Lynne Doyle A.P.N. Responsible Cardiovascular Disease (Cardiology) [phone]    AISHWARYA BartonPShaanNShaan Responsible Cardiovascular Disease (Cardiology) [phone]    [facility] Anticoagulation Services Responsible  [phone]        Anticoagulation Patient Findings      Voice message for patient regarding their anticoagulant.     HPI:   The reason for today's call is to prevent morbidity and mortality from a blood clot and/or stroke and to reduce the risk of bleeding while on a anticoagulant.     PCP:  Nohelia Leong P.A.-C.  [facility] [place] [address]    Assessment:     • INR  sub-therapeutic.       Current Outpatient Medications:   •  losartan, 50 mg, Oral, DAILY  •  HYDROcodone-acetaminophen, 1 Tablet, Oral, Q6HRS PRN  •  Stiolto Respimat, 2 Puff, Oral, DAILY (Patient not taking: Reported on 2021)  •  metoprolol SR, 100 mg, Oral, Q EVENING  •  sertraline, 150 mg, Oral, DAILY  •  atorvastatin, Take 1 tablet by mouth once daily  •  albuterol, 2 Puff, Inhalation, Q6HRS PRN  •  furosemide, 40 mg, Oral, QDAY PRN  •  potassium chloride SA, 20 mEq,  Oral, QDAY PRN  •  Biotin, 1,000 mcg, Oral, DAILY  •  PreserVision AREDS 2, 1 Capsule, Oral, DAILY  •  Vitamin D-3, 5,000 Units, Oral, DAILY  •  Vitamin B-12, 5,000 Units, Oral, DAILY  •  flecainide, 100 mg, Oral, BID  •  warfarin, TAKE 1 TO 1 & 1/2 (ONE TO ONE & ONE-HALF) TABLETS BY MOUTH ONCE DAILY (Patient taking differently: 2.5-5 mg, Oral, DAILY, Take 1 tablet = 5 mg every Mon, Wens, Thurs, Fri, Sat, Sun  Take 1/2 tablet = 2.5 mg every Tues.)      Plan:     • 5mg today then Continue the same warfarin dose, as noted above.       Follow-up:     • Our protocol suggests we test in 4 weeks.        Additional information discussed with patient:     • Asked patient to please call the anticoagulation clinic if they have any signs/symptoms of bleeding and/or thrombosis or any changes to diet or medications.      National recommendations regarding anticoagulation therapy:     The CHEST guidelines recommends frequent INR monitoring at regular intervals (a few days up to a max of 12 weeks) to ensure patients are on the proper dose of warfarin, and patients are not having any complications from therapy.  INRs can dramatically change over a short time period due to diet, medications, and medical conditions.       George Villar, PharmD, MS, BCACP, [facility]  Phone [phone] fax [phone]    This note was created using voice recognition software (Dragon). The accuracy of the dictation is limited by the abilities of the software. I have reviewed the note prior to signing, however some errors in grammar and context are still possible. If you have any questions related to this note please do not hesitate to contact our office.

## 2021-10-12 ENCOUNTER — APPOINTMENT (OUTPATIENT)
Dept: SLEEP MEDICINE | Facility: MEDICAL CENTER | Age: 81
End: 2021-10-12
Payer: MEDICARE

## 2021-10-19 DIAGNOSIS — I48.91 ATRIAL FIBRILLATION, UNSPECIFIED TYPE (HCC): Primary | ICD-10-CM

## 2021-10-20 LAB — INR PPP: 1.5 (ref 2–3.5)

## 2021-10-21 ENCOUNTER — ANTICOAGULATION MONITORING (OUTPATIENT)
Dept: VASCULAR LAB | Facility: MEDICAL CENTER | Age: 81
End: 2021-10-21

## 2021-10-21 DIAGNOSIS — G45.9 TIA (TRANSIENT ISCHEMIC ATTACK): ICD-10-CM

## 2021-10-21 NOTE — PROGRESS NOTES
Anticoagulation Summary  As of 10/21/2021    INR goal:  2.0-3.0   TTR:  72.1 % (3.9 y)   INR used for dosin.50 (10/20/2021)   Warfarin maintenance plan:  5 mg (5 mg x 1) every day   Weekly warfarin total:  35 mg   Plan last modified:  Sandrine Kelly, Darlene (10/21/2021)   Next INR check:  2021   Target end date:  Indefinite    Indications    TIA (transient ischemic attack) [G45.9]  Atrial fibrillation (CMS-HCC) [I48.91] [I48.91]             Anticoagulation Episode Summary     INR check location:  Outside Lab    Preferred lab:      Send INR reminders to:      Comments:  [facility]      Anticoagulation Care Providers     Provider Role Specialty Phone number    Narciso Contreras M.D.  Cardiovascular Disease (Cardiology) [phone]    JEANNA Mueller.P.N. Responsible Cardiovascular Disease (Cardiology) [phone]    Blaire Crowley A.P.NShaan Responsible Cardiovascular Disease (Cardiology) [phone]    [facility] Anticoagulation Services Responsible  [phone]          Refer to Anticoagulation Patient Findings for HPI  Patient Findings     Negatives:  Signs/symptoms of thrombosis, Signs/symptoms of bleeding, Laboratory test error suspected, Change in health, Change in alcohol use, Change in activity, Upcoming invasive procedure, Emergency department visit, Upcoming dental procedure, Missed doses, Extra doses, Change in medications, Change in diet/appetite, Hospital admission, Bruising, Other complaints          Spoke with pt.  INR is subtherapeutic.     Pt verifies warfarin weekly dosing.     Will have pt bolus x 1 day then increase weekly regimen    Repeat INR in 2 week(s).     Sandrine Kelly, PharmD

## 2021-11-02 ENCOUNTER — ANTICOAGULATION MONITORING (OUTPATIENT)
Dept: VASCULAR LAB | Facility: MEDICAL CENTER | Age: 81
End: 2021-11-02

## 2021-11-02 DIAGNOSIS — G45.9 TIA (TRANSIENT ISCHEMIC ATTACK): ICD-10-CM

## 2021-11-02 DIAGNOSIS — I48.91 ATRIAL FIBRILLATION, UNSPECIFIED TYPE (HCC): ICD-10-CM

## 2021-11-02 LAB — INR PPP: 2.8 (ref 2–3.5)

## 2021-11-02 NOTE — PROGRESS NOTES
OP   Telephone Anticoagulation Service Note      Anticoagulation Summary  As of 2021    INR goal:  2.0-3.0   TTR:  72.0 % (4 y)   INR used for dosin.80 (2021)   Warfarin maintenance plan:  5 mg (5 mg x 1) every day   Weekly warfarin total:  35 mg   Plan last modified:  Sandrine Kelly PharmD (10/21/2021)   Next INR check:  2021   Target end date:  Indefinite    Indications    TIA (transient ischemic attack) [G45.9]  Atrial fibrillation (CMS-[facility]) [I48.91] [I48.91]             Anticoagulation Episode Summary     INR check location:  Outside Lab    Preferred lab:      Send INR reminders to:      Comments:  [facility]      Anticoagulation Care Providers     Provider Role Specialty Phone number    Narciso Contreras M.D.  Cardiovascular Disease (Cardiology) [phone]    Lynne Doyle A.P.N. Responsible Cardiovascular Disease (Cardiology) [phone]    JEANNA Barton.P.N. Responsible Cardiovascular Disease (Cardiology) [phone]    [facility] Anticoagulation Services Responsible  [phone]        Anticoagulation Patient Findings     Left a message on the patient's voicemail today, informing the patient of a therapeutic INR of 2.8.   Instructed the patient to call the clinic with any changes to diet or medications, with any signs/sx of bleeding or clotting or with any questions or concerns.     Patient instructed to continue with the current warfarin dosing regimen, and asked to follow up again in 3 weeks.     Mitzi FunesD

## 2021-11-29 ENCOUNTER — ANTICOAGULATION MONITORING (OUTPATIENT)
Dept: MEDICAL GROUP | Facility: PHYSICIAN GROUP | Age: 81
End: 2021-11-29

## 2021-11-29 DIAGNOSIS — I48.91 ATRIAL FIBRILLATION, UNSPECIFIED TYPE (HCC): ICD-10-CM

## 2021-11-29 DIAGNOSIS — G45.9 TIA (TRANSIENT ISCHEMIC ATTACK): ICD-10-CM

## 2021-11-29 LAB — INR PPP: 2.8 (ref 2–3.5)

## 2021-11-29 NOTE — PROGRESS NOTES
OP Anticoagulation Service Note    Date: 2021    Anticoagulation Summary  As of 2021    INR goal:  2.0-3.0   TTR:  72.6 % (4 y)   INR used for dosin.80 (2021)   Warfarin maintenance plan:  5 mg (5 mg x 1) every day   Weekly warfarin total:  35 mg   Plan last modified:  Sandrine Kelly, Darlene (10/21/2021)   Next INR check:  1/10/2022   Target end date:  Indefinite    Indications    TIA (transient ischemic attack) [G45.9]  Atrial fibrillation (CMS-HCC) [I48.91] [I48.91]             Anticoagulation Episode Summary     INR check location:  Outside Lab    Preferred lab:      Send INR reminders to:      Comments:  [facility]      Anticoagulation Care Providers     Provider Role Specialty Phone number    Narciso Contreras M.D.  Cardiovascular Disease (Cardiology) [phone]    Lynne Doyle A.P.N. Responsible Cardiovascular Disease (Cardiology) [phone]    AISHWARYA BartonP.NShaan Responsible Cardiovascular Disease (Cardiology) [phone]    [facility] Anticoagulation Services Responsible  [phone]        Anticoagulation Patient Findings      Voice message for patient regarding their anticoagulant.     HPI:   The reason for today's call is to prevent morbidity and mortality from a blood clot and/or stroke and to reduce the risk of bleeding while on a anticoagulant.     PCP:  Nohelia Leong P.A.-C.  [address] [place] [address]    Assessment:     • INR  therapeutic.       Current Outpatient Medications:   •  losartan, 50 mg, Oral, DAILY  •  HYDROcodone-acetaminophen, 1 Tablet, Oral, Q6HRS PRN  •  Stiolto Respimat, 2 Puff, Oral, DAILY (Patient not taking: Reported on 2021)  •  metoprolol SR, 100 mg, Oral, Q EVENING  •  sertraline, 150 mg, Oral, DAILY  •  atorvastatin, Take 1 tablet by mouth once daily  •  albuterol, 2 Puff, Inhalation, Q6HRS PRN  •  furosemide, 40 mg, Oral, QDAY PRN  •  potassium chloride SA, 20 mEq, Oral, QDAY PRN  •  Biotin, 1,000 mcg,  Oral, DAILY  •  PreserVision AREDS 2, 1 Capsule, Oral, DAILY  •  Vitamin D-3, 5,000 Units, Oral, DAILY  •  Vitamin B-12, 5,000 Units, Oral, DAILY  •  flecainide, 100 mg, Oral, BID  •  warfarin, TAKE 1 TO 1 & 1/2 (ONE TO ONE & ONE-HALF) TABLETS BY MOUTH ONCE DAILY (Patient taking differently: 2.5-5 mg, Oral, DAILY, Take 1 tablet = 5 mg every Mon, Wens, Thurs, Fri, Sat, SunTake 1/2 tablet = 2.5 mg every Tues.)      Plan:     • Continue the same warfarin dose, as noted above.       Follow-up:     • Our protocol suggests we test in 6 weeks.        Additional information discussed with patient:     • Asked patient to please call the anticoagulation clinic if they have any signs/symptoms of bleeding and/or thrombosis or any changes to diet or medications.      National recommendations regarding anticoagulation therapy:     The CHEST guidelines recommends frequent INR monitoring at regular intervals (a few days up to a max of 12 weeks) to ensure patients are on the proper dose of warfarin, and patients are not having any complications from therapy.  INRs can dramatically change over a short time period due to diet, medications, and medical conditions.     [facility]  Phone [phone] fax [phone]    This note was created using voice recognition software (Dragon). The accuracy of the dictation is limited by the abilities of the software. I have reviewed the note prior to signing, however some errors in grammar and context are still possible. If you have any questions related to this note please do not hesitate to contact our office.

## 2021-12-07 DIAGNOSIS — I48.0 PAF (PAROXYSMAL ATRIAL FIBRILLATION) (HCC): ICD-10-CM

## 2021-12-07 DIAGNOSIS — G45.9 TRANSIENT CEREBRAL ISCHEMIA, UNSPECIFIED TYPE: ICD-10-CM

## 2021-12-07 RX ORDER — WARFARIN SODIUM 5 MG/1
TABLET ORAL
Qty: 90 TABLET | Refills: 1 | Status: SHIPPED | OUTPATIENT
Start: 2021-12-07 | End: 2022-05-27

## 2022-01-12 LAB — INR PPP: 2 (ref 2–3.5)

## 2022-01-14 ENCOUNTER — ANTICOAGULATION MONITORING (OUTPATIENT)
Dept: VASCULAR LAB | Facility: MEDICAL CENTER | Age: 82
End: 2022-01-14

## 2022-01-14 DIAGNOSIS — G45.9 TIA (TRANSIENT ISCHEMIC ATTACK): ICD-10-CM

## 2022-01-14 NOTE — PROGRESS NOTES
Anticoagulation Summary  As of 2022    INR goal:  2.0-3.0   TTR:  73.4 % (4.2 y)   INR used for dosin.00 (2022)   Warfarin maintenance plan:  5 mg (5 mg x 1) every day   Weekly warfarin total:  35 mg   Plan last modified:  Sandrine Mahoney, PharmD (10/21/2021)   Next INR check:  2022   Target end date:  Indefinite    Indications    TIA (transient ischemic attack) [G45.9]  Atrial fibrillation (CMS-HCC) [I48.91] [I48.91]             Anticoagulation Episode Summary     INR check location:  Outside Lab    Preferred lab:      Send INR reminders to:      Comments:  [facility]      Anticoagulation Care Providers     Provider Role Specialty Phone number    Narciso Contreras M.D.  Cardiovascular Disease (Cardiology) [phone]    AISHWARYA MuellerP.NShaan Responsible Cardiovascular Disease (Cardiology) [phone]    AISHWARYA BartonP.NShaan Responsible Cardiovascular Disease (Cardiology) [phone]    [facility] Anticoagulation Services Responsible  [phone]          Refer to Anticoagulation Patient Findings for HPI  Patient Findings     Negatives:  Signs/symptoms of thrombosis, Signs/symptoms of bleeding, Laboratory test error suspected, Change in health, Change in alcohol use, Change in activity, Upcoming invasive procedure, Emergency department visit, Upcoming dental procedure, Missed doses, Extra doses, Change in medications, Change in diet/appetite, Hospital admission, Bruising, Other complaints          Spoke with patient to report a therapeutic INR.      Pt is NOT on antiplatelet therapy.    Pt instructed to continue with current warfarin dosing regimen, confirms dosing.   Will follow up in 6 week(s).    Rose Wolfe, Pharmacy Intern

## 2022-03-10 ENCOUNTER — TELEPHONE (OUTPATIENT)
Dept: VASCULAR LAB | Facility: MEDICAL CENTER | Age: 82
End: 2022-03-10
Payer: MEDICARE

## 2022-03-10 ENCOUNTER — ANTICOAGULATION MONITORING (OUTPATIENT)
Dept: VASCULAR LAB | Facility: MEDICAL CENTER | Age: 82
End: 2022-03-10
Payer: MEDICARE

## 2022-03-10 DIAGNOSIS — G45.9 TIA (TRANSIENT ISCHEMIC ATTACK): ICD-10-CM

## 2022-03-10 LAB — INR PPP: 1.6 (ref 2–3.5)

## 2022-03-10 NOTE — PROGRESS NOTES
Anticoagulation Summary  As of 3/10/2022    INR goal:  2.0-3.0   TTR:  70.7 % (4.3 y)   INR used for dosin.60 (3/10/2022)   Warfarin maintenance plan:  5 mg (5 mg x 1) every day   Weekly warfarin total:  35 mg   Plan last modified:  Sandrine Mahoney, PharmD (10/21/2021)   Next INR check:  3/24/2022   Target end date:  Indefinite    Indications    TIA (transient ischemic attack) [G45.9]  Atrial fibrillation (CMS-HCC) [I48.91] [I48.91]             Anticoagulation Episode Summary     INR check location:  Outside Lab    Preferred lab:      Send INR reminders to:      Comments:  [facility]      Anticoagulation Care Providers     Provider Role Specialty Phone number    Narciso Contreras M.D.  Cardiovascular Disease (Cardiology) [phone]    JEANNA Mueller.P.N. Responsible Cardiovascular Disease (Cardiology) [phone]    JEANNA Barton.P.N. Responsible Cardiovascular Disease (Cardiology) [phone]    [facility] Anticoagulation Services Responsible  [phone]        Anticoagulation Patient Findings        Left voicemail message to report a sub therapeutic INR of 1.6.  Pt to BOLUS DOSE WITH 7.5MG TONIGHT THEN continue with current warfarin dosing regimen. Requested pt contact the clinic for any s/s of unusual bleeding, bruising, clotting or any changes to diet or medication.  Follow up in 2 weeks, to reduce the risk of adverse events related to this high risk medication, warfarin.    Virginia Mcarthur, Clinical Pharmacist

## 2022-03-23 LAB — INR PPP: 1.5 (ref 2–3.5)

## 2022-03-28 ENCOUNTER — DOCUMENTATION (OUTPATIENT)
Dept: VASCULAR LAB | Facility: MEDICAL CENTER | Age: 82
End: 2022-03-28
Payer: MEDICARE

## 2022-03-28 NOTE — PROGRESS NOTES
[facility]    Received a call from Laurie stating she got her lab drawn at [facility] ([phone]) on 3/23/22. Called [facility] and requested the results be faxed over. Pharmacy team to follow-up when results are received.     Leif Wooten, PharmD

## 2022-04-07 ENCOUNTER — TELEPHONE (OUTPATIENT)
Dept: VASCULAR LAB | Facility: MEDICAL CENTER | Age: 82
End: 2022-04-07
Payer: MEDICARE

## 2022-04-07 NOTE — TELEPHONE ENCOUNTER
Second call to [facility] for missing PT/INR    Virginia Mcarthur, Clinical Pharmacist, CDE, CACP

## 2022-04-19 ENCOUNTER — TELEPHONE (OUTPATIENT)
Dept: VASCULAR LAB | Facility: MEDICAL CENTER | Age: 82
End: 2022-04-19
Payer: MEDICARE

## 2022-04-19 ENCOUNTER — ANTICOAGULATION MONITORING (OUTPATIENT)
Dept: VASCULAR LAB | Facility: MEDICAL CENTER | Age: 82
End: 2022-04-19
Payer: MEDICARE

## 2022-04-19 DIAGNOSIS — G45.9 TIA (TRANSIENT ISCHEMIC ATTACK): ICD-10-CM

## 2022-04-19 NOTE — TELEPHONE ENCOUNTER
Received call that pt got INR done at [facility] in early April/late March    Called medical records to fax over INR result from 3/23  Sandrine Mahoney, PharmD

## 2022-04-19 NOTE — PROGRESS NOTES
Anticoagulation Summary  As of 2022    INR goal:  2.0-3.0   TTR:  70.1 % (4.3 y)   INR used for dosin.50 (3/23/2022)   Warfarin maintenance plan:  5 mg (5 mg x 1) every day   Weekly warfarin total:  35 mg   Plan last modified:  Sandrine Mahoney, MarinD (10/21/2021)   Next INR check:  2022   Target end date:  Indefinite    Indications    TIA (transient ischemic attack) [G45.9]  Atrial fibrillation (CMS-HCC) [I48.91] [I48.91]             Anticoagulation Episode Summary     INR check location:  Outside Lab    Preferred lab:      Send INR reminders to:      Comments:  [facility]      Anticoagulation Care Providers     Provider Role Specialty Phone number    Narciso Contreras M.D.  Cardiovascular Disease (Cardiology) [phone]    AISHWARYA MuellerP.NShaan Responsible Cardiovascular Disease (Cardiology) [phone]    JEANNA Barton.P.NShaan Responsible Cardiovascular Disease (Cardiology) [phone]    [facility] Anticoagulation Services Responsible  [phone]          Refer to Anticoagulation Patient Findings for HPI  Patient Findings     Negatives:  Signs/symptoms of thrombosis, Signs/symptoms of bleeding, Laboratory test error suspected, Change in health, Change in alcohol use, Change in activity, Upcoming invasive procedure, Emergency department visit, Upcoming dental procedure, Missed doses, Extra doses, Change in medications, Change in diet/appetite, Hospital admission, Bruising, Other complaints          Spoke with ot.  INR was subtherapeutic 4 weeks ago.     Pt verifies warfarin weekly dosing.     Will have pt continue regimen    Repeat INR in 1-2 day(s).     Sandrine Mahoney, MarinD

## 2022-04-20 DIAGNOSIS — I48.91 ATRIAL FIBRILLATION, UNSPECIFIED TYPE (HCC): Primary | ICD-10-CM

## 2022-04-20 NOTE — PROGRESS NOTES
Faxed most recent standing INR lab order to [facility]. Ph: [phone] Fax [phone]    Updated pt    Sandrine Mahoney, PharmD

## 2022-04-22 ENCOUNTER — TELEPHONE (OUTPATIENT)
Dept: CARDIOLOGY | Facility: MEDICAL CENTER | Age: 82
End: 2022-04-22
Payer: MEDICARE

## 2022-04-22 LAB — INR PPP: 2.4 (ref 2–3.5)

## 2022-04-22 NOTE — TELEPHONE ENCOUNTER
Called patient regarding lab work previously ordered by [facility]. Patient stated she hasn't gotten lab work done but is going to go to [facility] to complete [facility] work. Lab orders faxed to [place] [facility] [place]. Fax confirmation received. Confirmed upcoming appt date and time with patient.

## 2022-04-25 ENCOUNTER — TELEPHONE (OUTPATIENT)
Dept: CARDIOLOGY | Facility: MEDICAL CENTER | Age: 82
End: 2022-04-25
Payer: MEDICARE

## 2022-04-25 ENCOUNTER — ANTICOAGULATION MONITORING (OUTPATIENT)
Dept: VASCULAR LAB | Facility: MEDICAL CENTER | Age: 82
End: 2022-04-25
Payer: MEDICARE

## 2022-04-25 DIAGNOSIS — E78.5 DYSLIPIDEMIA, GOAL LDL BELOW 100: ICD-10-CM

## 2022-04-25 DIAGNOSIS — G45.9 TIA (TRANSIENT ISCHEMIC ATTACK): ICD-10-CM

## 2022-04-25 NOTE — PROGRESS NOTES
Anticoagulation Summary  As of 2022    INR goal:  2.0-3.0   TTR:  69.6 % (4.4 y)   INR used for dosin.40 (2022)   Warfarin maintenance plan:  5 mg (5 mg x 1) every day   Weekly warfarin total:  35 mg   Plan last modified:  Marin SolisD (10/21/2021)   Next INR check:  2022   Target end date:  Indefinite    Indications    TIA (transient ischemic attack) [G45.9]  Atrial fibrillation (CMS-HCC) [I48.91] [I48.91]             Anticoagulation Episode Summary     INR check location:  Outside Lab    Preferred lab:      Send INR reminders to:      Comments:  [facility]      Anticoagulation Care Providers     Provider Role Specialty Phone number    Narciso Contreras M.D.  Cardiovascular Disease (Cardiology) [phone]    AISHWARYA MuellerPShaanN. Responsible Cardiovascular Disease (Cardiology) [phone]    AISHWARYA BartonP.NShaan Responsible Cardiovascular Disease (Cardiology) [phone]    [facility] Anticoagulation Services Responsible  [phone]        Anticoagulation Patient Findings          Spoke with patient to report a therapeutic INR of 2.4.  Pt to continue with current warfarin dosing regimen. Requested pt contact the clinic for any s/s of unusual bleeding, bruising, clotting or any changes to diet or medication. Patient states she is going in to the lab tomorrow to have other labs drawn and will also have her INR checked then again as well.     Ezequiel Perez Ass't    I have reviewed and am in agreement with the above stated plan on 22.  Tawanda Vivas, PharmD, BCACP

## 2022-04-25 NOTE — TELEPHONE ENCOUNTER
AK    Patient called and states that she was supposed to get some lab orders faxed over to the [facility]. Please advise.    Fax: [phone]    Thank you,  Tobias THOMAS

## 2022-04-25 NOTE — TELEPHONE ENCOUNTER
Lab orders re-faxed to [facility] at [phone], receipt confirmed. Spoke to patient over phone and gave update.

## 2022-04-26 DIAGNOSIS — E78.5 DYSLIPIDEMIA, GOAL LDL BELOW 100: ICD-10-CM

## 2022-05-04 ENCOUNTER — ANTICOAGULATION MONITORING (OUTPATIENT)
Dept: VASCULAR LAB | Facility: MEDICAL CENTER | Age: 82
End: 2022-05-04
Payer: MEDICARE

## 2022-05-04 DIAGNOSIS — G45.9 TIA (TRANSIENT ISCHEMIC ATTACK): ICD-10-CM

## 2022-05-04 LAB — INR PPP: 2.1 (ref 2–3.5)

## 2022-05-04 NOTE — PROGRESS NOTES
Anticoagulation Summary  As of 2022    INR goal:  2.0-3.0   TTR:  69.7 % (4.4 y)   INR used for dosin.10 (2022)   Warfarin maintenance plan:  5 mg (5 mg x 1) every day   Weekly warfarin total:  35 mg   Plan last modified:  Sandrine Mahoney PharmD (10/21/2021)   Next INR check:  2022   Target end date:  Indefinite    Indications    TIA (transient ischemic attack) [G45.9]  Atrial fibrillation (CMS-HCC) [I48.91] [I48.91]             Anticoagulation Episode Summary     INR check location:  Outside Lab    Preferred lab:      Send INR reminders to:      Comments:  [facility]      Anticoagulation Care Providers     Provider Role Specialty Phone number    Narciso Contreras M.D.  Cardiovascular Disease (Cardiology) [phone]    JEANNA Mueller.P.N. Responsible Cardiovascular Disease (Cardiology) [phone]    JEANNA Barton.P.NShaan Responsible Cardiovascular Disease (Cardiology) [phone]    [facility] Anticoagulation Services Responsible  [phone]          Refer to Anticoagulation Patient Findings for HPI  Patient Findings     Negatives:  Signs/symptoms of thrombosis, Signs/symptoms of bleeding, Laboratory test error suspected, Change in health, Change in alcohol use, Change in activity, Upcoming invasive procedure, Emergency department visit, Upcoming dental procedure, Missed doses, Extra doses, Change in medications, Change in diet/appetite, Hospital admission, Bruising, Other complaints          Spoke with patient to report a therapeutic INR.      Pt instructed to continue with current warfarin dosing regimen, confirms dosing.   Will follow up in 2 week(s).     Sandrine Mahoney, MarinD

## 2022-05-27 DIAGNOSIS — I48.19 PERSISTENT ATRIAL FIBRILLATION (HCC): ICD-10-CM

## 2022-05-27 DIAGNOSIS — G45.9 TRANSIENT CEREBRAL ISCHEMIA, UNSPECIFIED TYPE: ICD-10-CM

## 2022-05-27 DIAGNOSIS — Z99.81 DEPENDENCE ON SUPPLEMENTAL OXYGEN: ICD-10-CM

## 2022-05-27 DIAGNOSIS — I48.0 PAROXYSMAL ATRIAL FIBRILLATION (HCC): ICD-10-CM

## 2022-05-27 DIAGNOSIS — E78.5 HYPERLIPIDEMIA, UNSPECIFIED HYPERLIPIDEMIA TYPE: ICD-10-CM

## 2022-05-30 RX ORDER — METOPROLOL SUCCINATE 100 MG/1
TABLET, EXTENDED RELEASE ORAL
Qty: 90 TABLET | Refills: 3 | Status: SHIPPED | OUTPATIENT
Start: 2022-05-30

## 2022-05-30 RX ORDER — FLECAINIDE ACETATE 100 MG/1
TABLET ORAL
Qty: 180 TABLET | Refills: 1 | Status: SHIPPED | OUTPATIENT
Start: 2022-05-30 | End: 2022-06-14

## 2022-05-30 RX ORDER — ATORVASTATIN CALCIUM 40 MG/1
TABLET, FILM COATED ORAL
Qty: 90 TABLET | Refills: 3 | Status: SHIPPED | OUTPATIENT
Start: 2022-05-30 | End: 2022-06-14

## 2022-05-31 ENCOUNTER — ANTICOAGULATION MONITORING (OUTPATIENT)
Dept: VASCULAR LAB | Facility: MEDICAL CENTER | Age: 82
End: 2022-05-31
Payer: MEDICARE

## 2022-05-31 DIAGNOSIS — G45.9 TIA (TRANSIENT ISCHEMIC ATTACK): ICD-10-CM

## 2022-05-31 LAB — INR PPP: 2.9 (ref 2–3.5)

## 2022-06-01 NOTE — PROGRESS NOTES
Anticoagulation Summary  As of 2022    INR goal:  2.0-3.0   TTR:  69.8 % (4.7 y)   INR used for dosin.90 (2022)   Warfarin maintenance plan:  5 mg (5 mg x 1) every day   Weekly warfarin total:  35 mg   Plan last modified:  Sandrine Mahoney, PharmD (10/21/2021)   Next INR check:  2022   Target end date:  Indefinite    Indications    TIA (transient ischemic attack) [G45.9]  Atrial fibrillation (CMS-HCC) [I48.91] [I48.91]             Anticoagulation Episode Summary     INR check location:  Outside Lab    Preferred lab:      Send INR reminders to:      Comments:  [facility]      Anticoagulation Care Providers     Provider Role Specialty Phone number    Narciso Contreras M.D.  Cardiovascular Disease (Cardiology) [phone]    AISHWARYA MuellerP.N. Responsible Cardiovascular Disease (Cardiology) [phone]    JEANNA Barton.P.NShaan Responsible Cardiovascular Disease (Cardiology) [phone]    [facility] Anticoagulation Services Responsible  [phone]        Anticoagulation Patient Findings  Patient Findings     Negatives:  Signs/symptoms of thrombosis, Signs/symptoms of bleeding, Laboratory test error suspected, Change in health, Change in alcohol use, Change in activity, Upcoming invasive procedure, Emergency department visit, Upcoming dental procedure, Missed doses, Extra doses, Change in medications, Change in diet/appetite, Hospital admission, Bruising, Other complaints           Spoke with patient today regarding therapeutic INR of 2.90.  Patient denies any signs/symptoms of bruising or bleeding or any changes in diet and medications.  Instructed patient to call clinic with any questions or concerns.    Pt is not on antiplatelet therapy     Pt is to continue with current warfarin dosing regimen.    Follow up in 4 weeks, to reduce risk of adverse events related to this high risk medication,  Warfarin.    Mk Harrington, Pharmacy Intern

## 2022-06-14 ENCOUNTER — NON-PROVIDER VISIT (OUTPATIENT)
Dept: CARDIOLOGY | Facility: MEDICAL CENTER | Age: 82
End: 2022-06-14
Payer: MEDICARE

## 2022-06-14 ENCOUNTER — OFFICE VISIT (OUTPATIENT)
Dept: CARDIOLOGY | Facility: MEDICAL CENTER | Age: 82
End: 2022-06-14
Payer: MEDICARE

## 2022-06-14 VITALS
BODY MASS INDEX: 35.79 KG/M2 | OXYGEN SATURATION: 92 % | RESPIRATION RATE: 12 BRPM | DIASTOLIC BLOOD PRESSURE: 70 MMHG | WEIGHT: 202 LBS | HEIGHT: 63 IN | HEART RATE: 107 BPM | SYSTOLIC BLOOD PRESSURE: 106 MMHG

## 2022-06-14 DIAGNOSIS — E78.5 DYSLIPIDEMIA, GOAL LDL BELOW 100: ICD-10-CM

## 2022-06-14 DIAGNOSIS — Z99.81 DEPENDENCE ON SUPPLEMENTAL OXYGEN: Chronic | ICD-10-CM

## 2022-06-14 DIAGNOSIS — Z87.891 FORMER TOBACCO USE: ICD-10-CM

## 2022-06-14 DIAGNOSIS — Z79.01 CHRONIC ANTICOAGULATION: ICD-10-CM

## 2022-06-14 DIAGNOSIS — I48.19 PERSISTENT ATRIAL FIBRILLATION (HCC): ICD-10-CM

## 2022-06-14 DIAGNOSIS — R13.10 DYSPHAGIA, UNSPECIFIED TYPE: ICD-10-CM

## 2022-06-14 DIAGNOSIS — E66.09 CLASS 1 OBESITY DUE TO EXCESS CALORIES WITH SERIOUS COMORBIDITY AND BODY MASS INDEX (BMI) OF 32.0 TO 32.9 IN ADULT: ICD-10-CM

## 2022-06-14 DIAGNOSIS — E78.5 HYPERLIPIDEMIA, UNSPECIFIED HYPERLIPIDEMIA TYPE: ICD-10-CM

## 2022-06-14 DIAGNOSIS — I48.0 PAROXYSMAL ATRIAL FIBRILLATION (HCC): ICD-10-CM

## 2022-06-14 DIAGNOSIS — I67.2 CEREBRAL ATHEROSCLEROSIS: ICD-10-CM

## 2022-06-14 DIAGNOSIS — I10 PRIMARY HYPERTENSION: ICD-10-CM

## 2022-06-14 DIAGNOSIS — N18.1 STAGE 1 CHRONIC KIDNEY DISEASE: ICD-10-CM

## 2022-06-14 DIAGNOSIS — J96.21 ACUTE ON CHRONIC RESPIRATORY FAILURE WITH HYPOXIA (HCC): ICD-10-CM

## 2022-06-14 DIAGNOSIS — I48.91 ATRIAL FIBRILLATION, UNSPECIFIED TYPE (HCC): ICD-10-CM

## 2022-06-14 DIAGNOSIS — J44.1 CHRONIC OBSTRUCTIVE PULMONARY DISEASE WITH ACUTE EXACERBATION (HCC): ICD-10-CM

## 2022-06-14 DIAGNOSIS — G45.9 TIA (TRANSIENT ISCHEMIC ATTACK): ICD-10-CM

## 2022-06-14 LAB — EKG IMPRESSION: NORMAL

## 2022-06-14 PROCEDURE — 93000 ELECTROCARDIOGRAM COMPLETE: CPT | Performed by: STUDENT IN AN ORGANIZED HEALTH CARE EDUCATION/TRAINING PROGRAM

## 2022-06-14 PROCEDURE — 99214 OFFICE O/P EST MOD 30 MIN: CPT | Performed by: NURSE PRACTITIONER

## 2022-06-14 RX ORDER — FLECAINIDE ACETATE 150 MG/1
150 TABLET ORAL 2 TIMES DAILY
Qty: 60 TABLET | Refills: 11 | Status: SHIPPED | OUTPATIENT
Start: 2022-06-14 | End: 2023-03-22

## 2022-06-14 RX ORDER — ATORVASTATIN CALCIUM 80 MG/1
80 TABLET, FILM COATED ORAL EVERY EVENING
Qty: 90 TABLET | Refills: 3 | Status: SHIPPED | OUTPATIENT
Start: 2022-06-14

## 2022-06-14 ASSESSMENT — ENCOUNTER SYMPTOMS
COUGH: 1
DIZZINESS: 0
CLAUDICATION: 0
SHORTNESS OF BREATH: 1
FALLS: 0
FEVER: 0
ORTHOPNEA: 0
PND: 0
MYALGIAS: 0
PALPITATIONS: 1
ABDOMINAL PAIN: 0
SPUTUM PRODUCTION: 1

## 2022-06-14 ASSESSMENT — FIBROSIS 4 INDEX: FIB4 SCORE: 1.87

## 2022-06-14 NOTE — PATIENT INSTRUCTIONS
Increase flecainide to 150 mg twice a day. I have sent a new prescription to your pharmacy.    We will place a heart monitor today to watch your heart rate at home.    You will wear this for 2 weeks and then I will call you with the results. If something goes on during the 2 weeks, the heart monitor company calls us.    I have referred you to the GI doctor for your trouble swallowing.    We will increase your cholesterol medication to 80 mg once in the evening.    We will repeat fasting labs in 6 months to check your cholesterol.

## 2022-06-14 NOTE — PROGRESS NOTES
"Chief Complaint   Patient presents with   • Atrial Fibrillation     Fv Dx: Paroxysmal atrial fibrillation    • Dyslipidemia     Fv Dx: Dyslipidemia, goal LDL below 100    • Hypertension     Subjective:   Laurie Adams is a 80 y.o. female who presents today for review of symptoms with afib.    She is a patient of Dr. Arita in our office.    Hx of obesity, paroxysmal afib on chronic anticoagulation, TIA, HTN, HLD, CKD stage I, depression, debility, hypothyroidism, former smoker, and COPD with supplemental O2.    She lives in [place], [place] with her .    She presents today alone and has had her follow up appointment rescheduled twice due to provider cancellations. I apologized for this.    She admits to going back into afib a month or so ago. She can feel her heart palpitating with this.    She continues with shortness of breath that is chronic with her COPD, she doesn't wear her oxygen all the time. She refuses CPAP and pulmonary consultation for probable MIKE.    She has no chest pain, edema, dizziness/lightheadedness    Past Medical History:   Diagnosis Date   • Arrhythmia 03/2015    A-fib takes flecainide   • Arthritis     fingers- osteo   • Breath shortness     02 @ 2L NOC   • Bronchial asthma 6/29/2012    Inhalers prn   • Bronchitis    • Cancer (HCC) 2005    squamous cell skin left chest   • Cataract     galo IOL   • Cerebral atherosclerosis    • Cold     Pt had \"head cold 6/1, surgery cristofer from 6/4 to 6/7, pt denies productive cough and SOB   • Dependence on supplemental oxygen     2 liters at night.   • Dizziness and giddiness     Episodic since ~2009   • Dyslipidemia, goal LDL below 100 6/29/2012   • High cholesterol    • History of tobacco use    • HTN (hypertension) 6/29/2012   • Obesity 6/29/2012   • Pneumonia     2015   • Stroke (HCC) 2010 2010-2017, multiple TIA's, generalized weakness   • TIA (transient ischemic attack) 6/29/2012   • Urinary incontinence      Past Surgical History: "   Procedure Laterality Date   • KNEE ARTHROPLASTY TOTAL Right 2018    Procedure: KNEE ARTHROPLASTY TOTAL;  Surgeon: Eric Bunn M.D.;  Location: SURGERY [facility];  Service: Orthopedics   • CATARACT PHACO WITH IOL  2013    Performed by Noe Jean M.D. at [facility]   • CATARACT PHACO WITH IOL  2013    Performed by Noe Jean M.D. at [facility]   • SEPTOPLASTY  2009    Performed by PABLITO LORENZANA at SURGERY [facility]   • SOMNOPLASTY  2009    Performed by PABLITO LORENZANA at SURGERY [facility]   • ANTROSTOMY  2009    Performed by PABLITO LORENZANA at SURGERY [facility]   • ETHMOIDECTOMY  2009    Performed by PABLITO LORENZANA at [facility]   • OTHER      sinus   • COLON RESECTION     • CHOLECYSTECTOMY     • APPENDECTOMY     • OTHER      mastoid   • GANGLION EXCISION Bilateral ,     left wrist, right finger   • HYSTERECTOMY, VAGINAL     • MASTOIDECTOMY     • NH CHOLECYSTOENTEROSTOMY+VIVIANA-EN-Y     • NH NASAL SCOPY,REPR CSF LEAK,SPHENOID     • WRIST FUSION       History reviewed. No pertinent family history.  Social History     Socioeconomic History   • Marital status:      Spouse name: Not on file   • Number of children: Not on file   • Years of education: Not on file   • Highest education level: Not on file   Occupational History   • Not on file   Tobacco Use   • Smoking status: Former Smoker     Packs/day: 1.00     Years: 40.00     Pack years: 40.00     Types: Cigarettes     Quit date: 1999     Years since quittin.8   • Smokeless tobacco: Never Used   Vaping Use   • Vaping Use: Never used   Substance and Sexual Activity   • Alcohol use: No   • Drug use: No   • Sexual activity: Yes     Partners: Male   Other Topics Concern   • Not on file   Social History Narrative   • Not on file     Social Determinants of Health     Financial Resource Strain: Not on  file   Food Insecurity: Not on file   Transportation Needs: Not on file   Physical Activity: Not on file   Stress: Not on file   Social Connections: Not on file   Intimate Partner Violence: Not on file   Housing Stability: Not on file     Allergies   Allergen Reactions   • Feldene [Piroxicam] Photosensitivity   • Sulfa Drugs Hives   • Codeine Anxiety     Outpatient Encounter Medications as of 6/14/2022   Medication Sig Dispense Refill   • flecainide (TAMBOCOR) 150 MG Tab Take 1 Tablet by mouth 2 times a day. 60 Tablet 11   • atorvastatin (LIPITOR) 80 MG tablet Take 1 Tablet by mouth every evening. 90 Tablet 3   • metoprolol SR (TOPROL XL) 100 MG TABLET SR 24 HR Take 1 tablet by mouth once daily 90 Tablet 3   • warfarin (COUMADIN) 5 MG Tab TAKE 1 TABLET BY MOUTH ONCE DAILY AS DIRECTED BY  [facility] 100 Tablet 1   • losartan (COZAAR) 50 MG Tab Take 1 Tablet by mouth every day. 90 Tablet 3   • sertraline (ZOLOFT) 100 MG Tab Take 1.5 Tablets by mouth every day. 30 tablet 11   • albuterol (VENTOLIN HFA) 108 (90 Base) MCG/ACT Aero Soln inhalation aerosol Inhale 2 Puffs every 6 hours as needed for Shortness of Breath. 18 g 0   • furosemide (LASIX) 40 MG Tab Take 1 tablet by mouth 1 time a day as needed (weight gain, swelling, shortness of breath). 30 tablet 2   • potassium chloride SA (KDUR) 20 MEQ Tab CR Take 1 tablet by mouth 1 time a day as needed (take with lasix only). 30 tablet 1   • Biotin 1000 MCG Tab Take 1,000 mcg by mouth every day.     • Multiple Vitamins-Minerals (PRESERVISION AREDS 2) Cap Take 1 capsule by mouth every day.     • Cholecalciferol (VITAMIN D-3) 125 MCG (5000 UT) Tab Take 5,000 Units by mouth every day.     • Cyanocobalamin (VITAMIN B-12) 5000 MCG TABLET DISPERSIBLE Take 5,000 Units by mouth every day.     • [DISCONTINUED] flecainide (TAMBOCOR) 100 MG Tab Take 1 tablet by mouth twice daily 180 Tablet 1   • [DISCONTINUED] atorvastatin (LIPITOR) 40 MG Tab Take 1 tablet by  "mouth once daily 90 Tablet 3   • [DISCONTINUED] HYDROcodone-acetaminophen (NORCO) 5-325 MG Tab per tablet Take 1 Tablet by mouth every 6 hours as needed.  FOR PAIN (Patient not taking: Reported on 6/14/2022)     • [DISCONTINUED] Tiotropium Bromide-Olodaterol (STIOLTO RESPIMAT) 2.5-2.5 MCG/ACT Aero Soln Take 2 Puffs by mouth every day. (Patient not taking: No sig reported) 1 Each 11     No facility-administered encounter medications on file as of 6/14/2022.     Review of Systems   Constitutional: Positive for malaise/fatigue. Negative for fever.   Respiratory: Positive for cough, sputum production and shortness of breath.    Cardiovascular: Positive for palpitations. Negative for chest pain, orthopnea, claudication, leg swelling and PND.   Gastrointestinal: Negative for abdominal pain.   Musculoskeletal: Negative for falls and myalgias.   Neurological: Negative for dizziness.        Objective:   /70 (BP Location: Left arm, Patient Position: Sitting, BP Cuff Size: Adult)   Pulse (!) 107   Resp 12   Ht 1.6 m (5' 3\")   Wt 91.6 kg (202 lb)   SpO2 92%   BMI 35.78 kg/m²     Physical Exam  Vitals and nursing note reviewed.   Constitutional:       Appearance: Normal appearance. She is well-developed. She is obese.   HENT:      Head: Normocephalic and atraumatic.   Neck:      Vascular: No JVD.   Cardiovascular:      Rate and Rhythm: Normal rate. Rhythm irregularly irregular.      Pulses: Normal pulses.      Heart sounds: Normal heart sounds.   Pulmonary:      Effort: Pulmonary effort is normal.      Breath sounds: Normal breath sounds.   Musculoskeletal:         General: Normal range of motion.   Skin:     General: Skin is warm and dry.      Capillary Refill: Capillary refill takes less than 2 seconds.   Neurological:      General: No focal deficit present.      Mental Status: She is alert and oriented to person, place, and time. Mental status is at baseline.   Psychiatric:         Mood and Affect: Mood normal.    "      Behavior: Behavior normal.         Thought Content: Thought content normal.         Judgment: Judgment normal.         Assessment:     1. Paroxysmal atrial fibrillation (HCC)  EKG    Holter Monitor Study   2. Chronic obstructive pulmonary disease with acute exacerbation (HCC)     3. Primary hypertension     4. Persistent atrial fibrillation (HCC)  flecainide (TAMBOCOR) 150 MG Tab   5. Acute on chronic respiratory failure with hypoxia (HCC)     6. Dyslipidemia, goal LDL below 100  Comp Metabolic Panel    Lipid Profile   7. Class 1 obesity due to excess calories with serious comorbidity and body mass index (BMI) of 32.0 to 32.9 in adult     8. TIA (transient ischemic attack)     9. Former tobacco use     10. Dependence on supplemental oxygen     11. Cerebral atherosclerosis     12. Stage 1 chronic kidney disease     13. Chronic anticoagulation     14. Dysphagia, unspecified type  Referral to Gastroenterology   15. Hyperlipidemia, unspecified hyperlipidemia type  atorvastatin (LIPITOR) 80 MG tablet     Medical Decision Making:  Today's Assessment / Status / Plan:     1. PAF  -EKG today shows afib  -increase flecainide to 150 mg BID  -cont toprol 100 mg QPM   -follow HR and BP at home  -cont coumadin per anticoagulation clinic   -consider ablation, two prior DCCV's  -14 day zio live for monitoring, EKG in 1 month    2. Acute on chronic respiratory failure, former smoker  -continue with home O2  -cont inhalers PRN  -refuses pulmonary or sleep medicine consult at this time    3. TIA  -no deficits  -cont coumadin  -LDL not at goal, increase to atorvastatin 80 mg QPM  -repeat lipid/cmp in 6 months    4. HTN  -good control on toprol  -furosemide, K PRN for weight gain or LE edema    5. CKD stage I  -follow labs, repeat bmp before next apt    6. 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

## 2022-06-14 NOTE — PROGRESS NOTES
Patient enrolled in the 14 day [facility] heart monitoring program, per Lynne Doran A.P.R.N.  >In clinic hook up, monitor S/N [id number].  >Baseline Transmitted.   >Pending EOS.

## 2022-06-15 ENCOUNTER — TELEPHONE (OUTPATIENT)
Dept: CARDIOLOGY | Facility: MEDICAL CENTER | Age: 82
End: 2022-06-15
Payer: MEDICARE

## 2022-06-15 DIAGNOSIS — I48.0 PAROXYSMAL ATRIAL FIBRILLATION (HCC): ICD-10-CM

## 2022-06-15 NOTE — TELEPHONE ENCOUNTER
Patient in office yesterday for appointment with [facility]. Monitor placed following visit due to known Afib. HR  per report. Patient on Warfarin, Flecainide dose increased yesterday.

## 2022-06-29 LAB — INR PPP: 3.4 (ref 2–3.5)

## 2022-07-01 ENCOUNTER — ANTICOAGULATION MONITORING (OUTPATIENT)
Dept: MEDICAL GROUP | Facility: PHYSICIAN GROUP | Age: 82
End: 2022-07-01
Payer: MEDICARE

## 2022-07-01 DIAGNOSIS — G45.9 TIA (TRANSIENT ISCHEMIC ATTACK): ICD-10-CM

## 2022-07-01 NOTE — PROGRESS NOTES
Anticoagulation Summary  As of 7/1/2022    INR goal:  2.0-3.0   TTR:  69.0 % (4.7 y)   INR used for dosing:  3.40 (6/29/2022)   Warfarin maintenance plan:  2.5 mg (5 mg x 0.5) every Fri; 5 mg (5 mg x 1) all other days   Weekly warfarin total:  32.5 mg   Plan last modified:  Stephania Bragg (7/1/2022)   Next INR check:  7/15/2022   Target end date:  Indefinite    Indications    TIA (transient ischemic attack) [G45.9]  Atrial fibrillation (CMS-HCC) [I48.91] [I48.91]             Anticoagulation Episode Summary     INR check location:  Outside Lab    Preferred lab:      Send INR reminders to:      Comments:  [facility]      Anticoagulation Care Providers     Provider Role Specialty Phone number    Narciso Contreras M.D.  Cardiovascular Disease (Cardiology) [phone]    JEANNA Mueller.P.N. Responsible Cardiovascular Disease (Cardiology) [phone]    AISHWARYA BartonP.NShaan Responsible Cardiovascular Disease (Cardiology) [phone]    [facility] Responsible  [phone]          Refer to Anticoagulation Patient Findings for HPI  Patient Findings     Negatives:  Signs/symptoms of thrombosis, Signs/symptoms of bleeding, Laboratory test error suspected, Change in health, Change in alcohol use, Change in activity, Upcoming invasive procedure, Emergency department visit, Upcoming dental procedure, Missed doses, Extra doses, Change in medications, Change in diet/appetite, Hospital admission, Bruising, Other complaints          Spoke with Laurie.  INR is 3.4 therapeutic.     Pt verifies warfarin weekly dosing.     Pt is NOT on antiplatelet therapy    Will have pt DECREASE dose on fridays to 2.5 mg (0.5 tablet) and 5 mg (1 tablet) all other days. INR has been in range but trending up for the last couple of months.     Repeat INR in 2 week(s).   Confirmed plan with Mitzi prior to calling Pt      Stephania Bragg     I have reviewed and concur with the above plan.       Current  Outpatient Medications:   •  flecainide, 150 mg, Oral, BID  •  atorvastatin, 80 mg, Oral, Q EVENING  •  metoprolol SR, Take 1 tablet by mouth once daily  •  warfarin, TAKE 1 TABLET BY MOUTH ONCE DAILY AS DIRECTED BY  [facility]  ANTICOAGULATION  SERVICES  •  losartan, 50 mg, Oral, DAILY  •  sertraline, 150 mg, Oral, DAILY  •  albuterol, 2 Puff, Inhalation, Q6HRS PRN  •  furosemide, 40 mg, Oral, QDAY PRN  •  potassium chloride SA, 20 mEq, Oral, QDAY PRN  •  Biotin, 1,000 mcg, Oral, DAILY  •  PreserVision AREDS 2, 1 Capsule, Oral, DAILY  •  Vitamin D-3, 5,000 Units, Oral, DAILY  •  Vitamin B-12, 5,000 Units, Oral, DAILY    George Villar, PharmD, MS, BCACP, [facility]  Phone: [phone],  Fax: [phone]  On call: [phone]

## 2022-07-05 ENCOUNTER — TELEPHONE (OUTPATIENT)
Dept: CARDIOLOGY | Facility: MEDICAL CENTER | Age: 82
End: 2022-07-05
Payer: MEDICARE

## 2022-07-06 PROCEDURE — 93268 ECG RECORD/REVIEW: CPT | Performed by: INTERNAL MEDICINE

## 2022-07-07 ENCOUNTER — TELEPHONE (OUTPATIENT)
Dept: CARDIOLOGY | Facility: MEDICAL CENTER | Age: 82
End: 2022-07-07
Payer: MEDICARE

## 2022-07-07 NOTE — TELEPHONE ENCOUNTER
----- Message from Edgardo Oseguera M.D. sent at 7/6/2022  6:30 PM PDT -----  Overall the heart monitor looks good.  There is atrial fibrillation with a well-controlled heart rate.

## 2022-07-14 LAB — INR PPP: 3.4 (ref 2–3.5)

## 2022-07-18 ENCOUNTER — ANTICOAGULATION MONITORING (OUTPATIENT)
Dept: VASCULAR LAB | Facility: MEDICAL CENTER | Age: 82
End: 2022-07-18
Payer: MEDICARE

## 2022-07-18 ENCOUNTER — DOCUMENTATION (OUTPATIENT)
Dept: MEDICAL GROUP | Facility: PHYSICIAN GROUP | Age: 82
End: 2022-07-18
Payer: MEDICARE

## 2022-07-18 DIAGNOSIS — I48.19 PERSISTENT ATRIAL FIBRILLATION (HCC): ICD-10-CM

## 2022-07-18 DIAGNOSIS — G45.9 TIA (TRANSIENT ISCHEMIC ATTACK): ICD-10-CM

## 2022-07-18 DIAGNOSIS — I48.91 ATRIAL FIBRILLATION, UNSPECIFIED TYPE (HCC): ICD-10-CM

## 2022-07-18 NOTE — PROGRESS NOTES
S/w [facility] records to request INR from 7-14-22, they will fax ASAP.  Tawanda Vivas, PharmD, BCACP

## 2022-07-18 NOTE — PROGRESS NOTES
Anticoagulation Summary  As of 7/18/2022    INR goal:  2.0-3.0   TTR:  68.4 % (4.8 y)   INR used for dosing:  3.40 (7/14/2022)   Warfarin maintenance plan:  2.5 mg (5 mg x 0.5) every Mon, Fri; 5 mg (5 mg x 1) all other days   Weekly warfarin total:  30 mg   Plan last modified:  Mariam Evans, Pharmacy Intern (7/18/2022)   Next INR check:  7/28/2022   Target end date:  Indefinite    Indications    TIA (transient ischemic attack) [G45.9]  Atrial fibrillation (CMS-HCC) [I48.91] [I48.91]             Anticoagulation Episode Summary     INR check location:  Outside Lab    Preferred lab:      Send INR reminders to:      Comments:  [facility]      Anticoagulation Care Providers     Provider Role Specialty Phone number    Narciso Contreras M.D.  Cardiovascular Disease (Cardiology) [phone]    Lynne Doyle A.P.N. Responsible Cardiovascular Disease (Cardiology) [phone]    Blaire Crowley A.P.N. Responsible Cardiovascular Disease (Cardiology) [phone]    [facility] Responsible  [phone]        Anticoagulation Patient Findings  Patient Findings     Positives:  Change in health (Sprained ankle 2 weeks ago), Change in medications (increase flecanide and lisinopril dose), Bruising (big bruise from ankle sprain that is improving)    Negatives:  Signs/symptoms of thrombosis, Signs/symptoms of bleeding, Laboratory test error suspected, Change in alcohol use, Change in activity, Upcoming invasive procedure, Emergency department visit, Upcoming dental procedure, Missed doses, Extra doses, Change in diet/appetite, Hospital admission, Other complaints              Spoke with patient today regarding SUPRA-therapeutic INR of 3.4.  Patient denies any signs/symptoms of bruising or bleeding or any changes in diet and medications.  Instructed patient to call clinic with any questions or concerns.    Pt is not on antiplatelet therapy      Pt is to decrease warfarin dosing regimen as listed  above.  Follow up in 2 weeks, to reduce risk of adverse events related to this high risk medication,  Warfarin.    Reviewed plan with Tawanda Evans, Pharmacy Intern

## 2022-07-19 ENCOUNTER — OFFICE VISIT (OUTPATIENT)
Dept: CARDIOLOGY | Facility: MEDICAL CENTER | Age: 82
End: 2022-07-19
Payer: MEDICARE

## 2022-07-19 VITALS
DIASTOLIC BLOOD PRESSURE: 70 MMHG | OXYGEN SATURATION: 93 % | HEART RATE: 82 BPM | BODY MASS INDEX: 35.44 KG/M2 | WEIGHT: 200 LBS | SYSTOLIC BLOOD PRESSURE: 118 MMHG | HEIGHT: 63 IN | RESPIRATION RATE: 14 BRPM

## 2022-07-19 DIAGNOSIS — I67.2 CEREBRAL ATHEROSCLEROSIS: ICD-10-CM

## 2022-07-19 DIAGNOSIS — Z79.01 CHRONIC ANTICOAGULATION: ICD-10-CM

## 2022-07-19 DIAGNOSIS — E78.5 DYSLIPIDEMIA, GOAL LDL BELOW 100: ICD-10-CM

## 2022-07-19 DIAGNOSIS — E66.09 CLASS 1 OBESITY DUE TO EXCESS CALORIES WITH SERIOUS COMORBIDITY AND BODY MASS INDEX (BMI) OF 32.0 TO 32.9 IN ADULT: ICD-10-CM

## 2022-07-19 DIAGNOSIS — J44.1 CHRONIC OBSTRUCTIVE PULMONARY DISEASE WITH ACUTE EXACERBATION (HCC): ICD-10-CM

## 2022-07-19 DIAGNOSIS — N18.1 STAGE 1 CHRONIC KIDNEY DISEASE: ICD-10-CM

## 2022-07-19 DIAGNOSIS — J96.21 ACUTE ON CHRONIC RESPIRATORY FAILURE WITH HYPOXIA (HCC): ICD-10-CM

## 2022-07-19 DIAGNOSIS — I48.19 PERSISTENT ATRIAL FIBRILLATION (HCC): ICD-10-CM

## 2022-07-19 DIAGNOSIS — G45.9 TIA (TRANSIENT ISCHEMIC ATTACK): ICD-10-CM

## 2022-07-19 DIAGNOSIS — Z87.891 FORMER TOBACCO USE: ICD-10-CM

## 2022-07-19 DIAGNOSIS — Z99.81 DEPENDENCE ON SUPPLEMENTAL OXYGEN: Chronic | ICD-10-CM

## 2022-07-19 DIAGNOSIS — I10 PRIMARY HYPERTENSION: ICD-10-CM

## 2022-07-19 LAB — EKG IMPRESSION: NORMAL

## 2022-07-19 PROCEDURE — 93000 ELECTROCARDIOGRAM COMPLETE: CPT | Performed by: INTERNAL MEDICINE

## 2022-07-19 PROCEDURE — 99214 OFFICE O/P EST MOD 30 MIN: CPT | Performed by: NURSE PRACTITIONER

## 2022-07-19 ASSESSMENT — ENCOUNTER SYMPTOMS
SHORTNESS OF BREATH: 1
COUGH: 0
ABDOMINAL PAIN: 0
FEVER: 0
DIZZINESS: 0
FALLS: 0
PALPITATIONS: 0
SPUTUM PRODUCTION: 0
ORTHOPNEA: 0
CLAUDICATION: 0
MYALGIAS: 0
PND: 0

## 2022-07-19 ASSESSMENT — FIBROSIS 4 INDEX: FIB4 SCORE: 1.87

## 2022-07-19 NOTE — PATIENT INSTRUCTIONS
Reduce toprol to 50 mg every evening for fatigue.    Check in by phone in 2 weeks to let us know your vitals and how your fatigue is doing with lower dose metoprolol.    BP goal 110-140/60-90 at rest.  HR goal is 60-90 at rest.    Call [phone], ask to speak to Lynne's nurse.    Get labs done when able.    Call for any concerns with med adjustment.

## 2022-07-19 NOTE — PROGRESS NOTES
"Chief Complaint   Patient presents with   • Atrial Fibrillation     F/V Dx: Paroxysmal atrial fibrillation (HCC)   • Dyslipidemia     F/V Dx: Dyslipidemia, goal LDL below 100   • Transient Ischemic Attack     Subjective:   Laurie Adams is a 80 y.o. female who presents today for 1 month follow up on afib rates with EKG.    She is a patient of Dr. Arita in our office.    Hx of obesity, paroxysmal afib on chronic anticoagulation, TIA, HTN, HLD, CKD stage I, depression, debility, hypothyroidism, former smoker, and COPD with supplemental O2.    She lives in [place], [place] with her . She presents today alone, her friend drove her.    She admits to going back into afib two months or so ago after her DCCV. Her afib rates are controlled but she continues with fatigue, imbalance, nad shortness of breath with her COPD.    She uses a walker for mobility.    She has no chest pain, edema, dizziness/lightheadedness    Past Medical History:   Diagnosis Date   • Arrhythmia 03/2015    A-fib takes flecainide   • Arthritis     fingers- osteo   • Breath shortness     02 @ 2L NOC   • Bronchial asthma 6/29/2012    Inhalers prn   • Bronchitis    • Cancer (HCC) 2005    squamous cell skin left chest   • Cataract     galo IOL   • Cerebral atherosclerosis    • Cold     Pt had \"head cold 6/1, surgery cristofer from 6/4 to 6/7, pt denies productive cough and SOB   • Dependence on supplemental oxygen     2 liters at night.   • Dizziness and giddiness     Episodic since ~2009   • Dyslipidemia, goal LDL below 100 6/29/2012   • High cholesterol    • History of tobacco use    • HTN (hypertension) 6/29/2012   • Obesity 6/29/2012   • Pneumonia     2015   • Stroke (HCC) 2010 2010-2017, multiple TIA's, generalized weakness   • TIA (transient ischemic attack) 6/29/2012   • Urinary incontinence      Past Surgical History:   Procedure Laterality Date   • KNEE ARTHROPLASTY TOTAL Right 6/7/2018    Procedure: KNEE ARTHROPLASTY TOTAL;  " Surgeon: Eric Bunn M.D.;  Location: SURGERY [facility];  Service: Orthopedics   • CATARACT PHACO WITH IOL  2013    Performed by Noe Jean M.D. at SURGERY [facility]   • CATARACT PHACO WITH IOL  2013    Performed by Noe Jean M.D. at [facility]   • SEPTOPLASTY  2009    Performed by PABLITO LORENZANA at SURGERY [facility]   • SOMNOPLASTY  2009    Performed by PABLITO LORENZANA at SURGERY [facility]   • ANTROSTOMY  2009    Performed by PABLITO LORENZANA at SURGERY [facility]   • ETHMOIDECTOMY  2009    Performed by PABLITO LORENZANA at SURGERY [facility]   • OTHER      sinus   • COLON RESECTION     • CHOLECYSTECTOMY     • APPENDECTOMY     • OTHER      mastoid   • GANGLION EXCISION Bilateral ,     left wrist, right finger   • HYSTERECTOMY, VAGINAL     • MASTOIDECTOMY     • [place] CHOLECYSTOENTEROSTOMY+VIVIANA-EN-Y     • [place] NASAL SCOPY,REPR CSF LEAK,SPHENOID     • WRIST FUSION       History reviewed. No pertinent family history.  Social History     Socioeconomic History   • Marital status:      Spouse name: Not on file   • Number of children: Not on file   • Years of education: Not on file   • Highest education level: Not on file   Occupational History   • Not on file   Tobacco Use   • Smoking status: Former Smoker     Packs/day: 1.00     Years: 40.00     Pack years: 40.00     Types: Cigarettes     Quit date: 1999     Years since quittin.9   • Smokeless tobacco: Never Used   Vaping Use   • Vaping Use: Never used   Substance and Sexual Activity   • Alcohol use: No   • Drug use: No   • Sexual activity: Yes     Partners: Male   Other Topics Concern   • Not on file   Social History Narrative   • Not on file     Social Determinants of Health     Financial Resource Strain: Not on file   Food Insecurity: Not on file   Transportation Needs: Not on file   Physical Activity: Not on file   Stress:  Not on file   Social Connections: Not on file   Intimate Partner Violence: Not on file   Housing Stability: Not on file     Allergies   Allergen Reactions   • Feldene [Piroxicam] Photosensitivity   • Sulfa Drugs Hives   • Codeine Anxiety     Outpatient Encounter Medications as of 7/19/2022   Medication Sig Dispense Refill   • flecainide (TAMBOCOR) 150 MG Tab Take 1 Tablet by mouth 2 times a day. 60 Tablet 11   • atorvastatin (LIPITOR) 80 MG tablet Take 1 Tablet by mouth every evening. 90 Tablet 3   • metoprolol SR (TOPROL XL) 100 MG TABLET SR 24 HR Take 1 tablet by mouth once daily 90 Tablet 3   • warfarin (COUMADIN) 5 MG Tab TAKE 1 TABLET BY MOUTH ONCE DAILY AS DIRECTED BY  [facility] 100 Tablet 1   • losartan (COZAAR) 50 MG Tab Take 1 Tablet by mouth every day. 90 Tablet 3   • sertraline (ZOLOFT) 100 MG Tab Take 1.5 Tablets by mouth every day. 30 tablet 11   • albuterol (VENTOLIN HFA) 108 (90 Base) MCG/ACT Aero Soln inhalation aerosol Inhale 2 Puffs every 6 hours as needed for Shortness of Breath. 18 g 0   • Biotin 1000 MCG Tab Take 1,000 mcg by mouth every day.     • Multiple Vitamins-Minerals (PRESERVISION AREDS 2) Cap Take 1 capsule by mouth every day.     • Cholecalciferol (VITAMIN D-3) 125 MCG (5000 UT) Tab Take 5,000 Units by mouth every day.     • Cyanocobalamin (VITAMIN B-12) 5000 MCG TABLET DISPERSIBLE Take 5,000 Units by mouth every day.     • [DISCONTINUED] furosemide (LASIX) 40 MG Tab Take 1 tablet by mouth 1 time a day as needed (weight gain, swelling, shortness of breath). (Patient not taking: Reported on 7/19/2022) 30 tablet 2   • [DISCONTINUED] potassium chloride SA (KDUR) 20 MEQ Tab CR Take 1 tablet by mouth 1 time a day as needed (take with lasix only). (Patient not taking: Reported on 7/19/2022) 30 tablet 1     No facility-administered encounter medications on file as of 7/19/2022.     Review of Systems   Constitutional: Positive for malaise/fatigue. Negative for fever.  "  Respiratory: Positive for shortness of breath. Negative for cough and sputum production.    Cardiovascular: Negative for chest pain, palpitations, orthopnea, claudication, leg swelling and PND.   Gastrointestinal: Negative for abdominal pain.   Musculoskeletal: Negative for falls and myalgias.   Neurological: Negative for dizziness.        Objective:   /70 (BP Location: Left arm, Patient Position: Sitting, BP Cuff Size: Adult)   Pulse 82   Resp 14   Ht 1.6 m (5' 3\")   Wt 90.7 kg (200 lb)   SpO2 93%   BMI 35.43 kg/m²     Physical Exam  Vitals and nursing note reviewed.   Constitutional:       Appearance: Normal appearance. She is well-developed. She is obese.   HENT:      Head: Normocephalic and atraumatic.   Neck:      Vascular: No JVD.   Cardiovascular:      Rate and Rhythm: Normal rate. Rhythm irregularly irregular.      Pulses: Normal pulses.      Heart sounds: Normal heart sounds.   Pulmonary:      Effort: Pulmonary effort is normal.      Breath sounds: Normal breath sounds.   Musculoskeletal:         General: Normal range of motion.   Skin:     General: Skin is warm and dry.      Capillary Refill: Capillary refill takes less than 2 seconds.   Neurological:      General: No focal deficit present.      Mental Status: She is alert and oriented to person, place, and time. Mental status is at baseline.   Psychiatric:         Mood and Affect: Mood normal.         Behavior: Behavior normal.         Thought Content: Thought content normal.         Judgment: Judgment normal.         Assessment:     1. Primary hypertension     2. Acute on chronic respiratory failure with hypoxia (HCC)     3. Dyslipidemia, goal LDL below 100     4. Class 1 obesity due to excess calories with serious comorbidity and body mass index (BMI) of 32.0 to 32.9 in adult     5. Chronic obstructive pulmonary disease with acute exacerbation (HCC)     6. Persistent atrial fibrillation (HCC)  EKG    EKG   7. TIA (transient ischemic " attack)     8. Former tobacco use     9. Dependence on supplemental oxygen     10. Cerebral atherosclerosis     11. Stage 1 chronic kidney disease     12. Chronic anticoagulation       Medical Decision Making:  Today's Assessment / Status / Plan:     1. PAF  -EKG today shows afib, rate controlled  -cont flecainide to 150 mg BID  -reduce toprol to 50 mg QPM   -follow HR and BP at home  -cont coumadin per anticoagulation clinic   -not quite interested in repeat DCCV or ablation at this time  -med management for now    2. Acute on chronic respiratory failure, former smoker  -continue with home O2 PRN  -cont inhalers PRN  -refuses pulmonary or sleep medicine consult at this time    3. TIA  -no deficits  -cont coumadin  -cont atorvastatin 80 mg QPM  -repeat lipid/cmp in 6 months    4. HTN  -good control on losartan and toprol, reduce toprol as above and follow BP at home  -BP goal <130/80  -furosemide, K PRN for weight gain or LE edema    5. CKD stage I  -follow labs, pending    6. 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

## 2022-08-01 ENCOUNTER — ANTICOAGULATION MONITORING (OUTPATIENT)
Dept: MEDICAL GROUP | Facility: PHYSICIAN GROUP | Age: 82
End: 2022-08-01
Payer: MEDICARE

## 2022-08-01 DIAGNOSIS — G45.9 TIA (TRANSIENT ISCHEMIC ATTACK): ICD-10-CM

## 2022-08-01 DIAGNOSIS — I48.19 PERSISTENT ATRIAL FIBRILLATION (HCC): ICD-10-CM

## 2022-08-01 LAB — INR PPP: 4 (ref 2–3.5)

## 2022-08-02 DIAGNOSIS — E78.5 DYSLIPIDEMIA, GOAL LDL BELOW 100: ICD-10-CM

## 2022-08-02 NOTE — PROGRESS NOTES
Anticoagulation Summary  As of 2022    INR goal:  2.0-3.0   TTR:  67.7 % (4.8 y)   INR used for dosin.00 (2022)   Warfarin maintenance plan:  2.5 mg (5 mg x 0.5) every Mon, Wed, Fri; 5 mg (5 mg x 1) all other days   Weekly warfarin total:  27.5 mg   Plan last modified:  Mariam Evans, Pharmacy Intern (2022)   Next INR check:  8/15/2022   Target end date:  Indefinite    Indications    TIA (transient ischemic attack) [G45.9]  Atrial fibrillation (CMS-HCC) [I48.91] [I48.91]             Anticoagulation Episode Summary     INR check location:  Outside Lab    Preferred lab:      Send INR reminders to:      Comments:  [facility]      Anticoagulation Care Providers     Provider Role Specialty Phone number    Narciso Contreras M.D.  Cardiovascular Disease (Cardiology) [phone]    Lynne Doyle A.P.N. Responsible Cardiovascular Disease (Cardiology) [phone]    JEANNA Barton.P.N. Responsible Cardiovascular Disease (Cardiology) [phone]    [facility] Responsible  [phone]        Anticoagulation Patient Findings  Patient Findings     Positives:  Change in health (broke ankle)    Negatives:  Signs/symptoms of thrombosis, Signs/symptoms of bleeding, Laboratory test error suspected, Change in alcohol use, Change in activity, Upcoming invasive procedure, Emergency department visit, Upcoming dental procedure, Missed doses, Extra doses, Change in medications, Change in diet/appetite, Hospital admission, Bruising, Other complaints         Spoke with patient today regarding SUPRA-therapeutic INR of 4.0.  Patient denies any signs/symptoms of bruising or bleeding or any changes in diet and medications.  Instructed patient to call clinic with any questions or concerns.    Pt is not on antiplatelet therapy      Pt is to hold dose x 1, then decrease weekly warfarin dose as listed above  Follow up in 2 weeks, to reduce risk of adverse events related to this high risk  medication,  Warfarin.    Reviewed plan with Tawanda Evans, Pharmacy Intern

## 2022-08-04 ENCOUNTER — TELEPHONE (OUTPATIENT)
Dept: CARDIOLOGY | Facility: MEDICAL CENTER | Age: 82
End: 2022-08-04
Payer: MEDICARE

## 2022-08-04 DIAGNOSIS — I10 PRIMARY HYPERTENSION: ICD-10-CM

## 2022-08-04 DIAGNOSIS — E78.5 DYSLIPIDEMIA, GOAL LDL BELOW 100: ICD-10-CM

## 2022-08-04 RX ORDER — EZETIMIBE 10 MG/1
10 TABLET ORAL EVERY EVENING
Qty: 90 TABLET | Refills: 3 | Status: SHIPPED | OUTPATIENT
Start: 2022-08-04 | End: 2023-08-22

## 2022-08-04 NOTE — TELEPHONE ENCOUNTER
----- Message from BECKIE Noriega sent at 8/4/2022  2:37 PM PDT -----  LDL not <100 with TIA hx.    Recommend cont statin and add zetia 10 mg QPM. Repeat fasting lipid/cmp in 6 months.    CKD remains, recommend follow up with PCP for review and consider nephrology consult. SC

## 2022-08-04 NOTE — TELEPHONE ENCOUNTER
Lab orders placed and put in mail for patient. RX sent to preferred pharmacy on file. Spoke to patient over phone. Results reviewed and recommendations given. Patient verbalizes understanding and states she fell three times and ended up breaking her ankle. Per patient, overall she is doing ok. Advised to reach out with any questions or concerns.

## 2022-08-23 ENCOUNTER — ANTICOAGULATION MONITORING (OUTPATIENT)
Dept: MEDICAL GROUP | Facility: PHYSICIAN GROUP | Age: 82
End: 2022-08-23
Payer: MEDICARE

## 2022-08-23 DIAGNOSIS — G45.9 TIA (TRANSIENT ISCHEMIC ATTACK): ICD-10-CM

## 2022-08-23 DIAGNOSIS — I48.19 PERSISTENT ATRIAL FIBRILLATION (HCC): ICD-10-CM

## 2022-08-23 LAB — INR PPP: 2.2 (ref 2–3.5)

## 2022-08-23 NOTE — PROGRESS NOTES
Anticoagulation Summary  As of 2022      INR goal:  2.0-3.0   TTR:  67.4 % (4.9 y)   INR used for dosin.20 (2022)   Warfarin maintenance plan:  2.5 mg (5 mg x 0.5) every Mon, Wed, Fri; 5 mg (5 mg x 1) all other days   Weekly warfarin total:  27.5 mg   Plan last modified:  Mariam Evans, Pharmacy Intern (2022)   Next INR check:  2022   Target end date:  Indefinite    Indications    TIA (transient ischemic attack) [G45.9]  Atrial fibrillation (CMS-HCC) [I48.91] [I48.91]                 Anticoagulation Episode Summary       INR check location:  Outside Lab    Preferred lab:      Send INR reminders to:      Comments:  [facility]          Anticoagulation Care Providers       Provider Role Specialty Phone number    Narciso Contreras M.D.  Cardiovascular Disease (Cardiology) [phone]    AISHWARYA MuellerP.N. Responsible Cardiovascular Disease (Cardiology) [phone]    AISHWARYA BartonP.NShaan Responsible Cardiovascular Disease (Cardiology) [phone]    [facility] Anticoagulation Services Responsible  [phone]            Refer to Anticoagulation Patient Findings for HPI  Patient Findings       Negatives:  Signs/symptoms of thrombosis, Signs/symptoms of bleeding, Laboratory test error suspected, Change in health, Change in alcohol use, Change in activity, Upcoming invasive procedure, Emergency department visit, Upcoming dental procedure, Missed doses, Extra doses, Change in medications, Change in diet/appetite, Hospital admission, Bruising, Other complaints            Spoke with patient to report a therapeutic INR.      Pt is NOT on antiplatelet therapy     Pt instructed to continue with current warfarin dosing regimen, confirms dosing.   Will follow up in 2 week(s).     Stephania Mahajan

## 2022-09-12 DIAGNOSIS — I10 ESSENTIAL HYPERTENSION: ICD-10-CM

## 2022-09-12 NOTE — TELEPHONE ENCOUNTER
Is the patient due for a refill? Yes    Was the patient seen the past year? Yes    Date of last office visit: 7/19/22    Does the patient have an upcoming appointment?  Yes   If yes, When? 10/14/22    Provider to refill:SC    Does the patients insurance require a 100 day supply?  No

## 2022-09-13 ENCOUNTER — ANTICOAGULATION MONITORING (OUTPATIENT)
Dept: MEDICAL GROUP | Facility: PHYSICIAN GROUP | Age: 82
End: 2022-09-13
Payer: MEDICARE

## 2022-09-13 DIAGNOSIS — G45.9 TIA (TRANSIENT ISCHEMIC ATTACK): ICD-10-CM

## 2022-09-13 DIAGNOSIS — I48.19 PERSISTENT ATRIAL FIBRILLATION (HCC): ICD-10-CM

## 2022-09-13 LAB — INR PPP: 1.9 (ref 2–3.5)

## 2022-09-13 NOTE — PROGRESS NOTES
Anticoagulation Summary  As of 2022      INR goal:  2.0-3.0   TTR:  67.4 % (4.9 y)   INR used for dosin.90 (2022)   Warfarin maintenance plan:  2.5 mg (5 mg x 0.5) every Mon, Wed, Fri; 5 mg (5 mg x 1) all other days   Weekly warfarin total:  27.5 mg   Plan last modified:  Mariam Evans, Pharmacy Intern (2022)   Next INR check:  2022   Target end date:  Indefinite    Indications    TIA (transient ischemic attack) [G45.9]  Atrial fibrillation (CMS-HCC) [I48.91] [I48.91]                 Anticoagulation Episode Summary       INR check location:  Outside Lab    Preferred lab:      Send INR reminders to:      Comments:  [facility]          Anticoagulation Care Providers       Provider Role Specialty Phone number    Narciso Contreras M.D.  Cardiovascular Disease (Cardiology) [phone]    AISHWARYA MuellerP.N. Responsible Cardiovascular Disease (Cardiology) [phone]    AISHWARYA BartonPShaanNShaan Responsible Cardiovascular Disease (Cardiology) [phone]    [facility] Anticoagulation Services Responsible  [phone]            Refer to Anticoagulation Patient Findings for HPI  Patient Findings       Negatives:  Signs/symptoms of thrombosis, Signs/symptoms of bleeding, Laboratory test error suspected, Change in health, Change in alcohol use, Change in activity, Upcoming invasive procedure, Emergency department visit, Upcoming dental procedure, Missed doses, Extra doses, Change in medications, Change in diet/appetite, Hospital admission, Bruising, Other complaints            Spoke with patient to report a sub-therapeutic INR.      Pt is NOT on antiplatelet therapy     Pt instructed to continue with current warfarin dosing regimen, confirms dosing.   Will follow up in 2 week(s).     Stephania Mahajan   Discussed with Sandrine Colon    +++++++++++++++++++++++++++++++++++++++++++++++++++++++++++++++++++    I have reviewed and concur with the above plan.       Current Outpatient Medications:      losartan, Take 1 tablet by mouth once daily    ezetimibe, 10 mg, Oral, Q EVENING    flecainide, 150 mg, Oral, BID    atorvastatin, 80 mg, Oral, Q EVENING    metoprolol SR, Take 1 tablet by mouth once daily    warfarin, TAKE 1 TABLET BY MOUTH ONCE DAILY AS DIRECTED BY  [facility]  ANTICOAGULATION  SERVICES    sertraline, 150 mg, Oral, DAILY    albuterol, 2 Puff, Inhalation, Q6HRS PRN    Biotin, 1,000 mcg, Oral, DAILY    PreserVision AREDS 2, 1 Capsule, Oral, DAILY    Vitamin D-3, 5,000 Units, Oral, DAILY    Vitamin B-12, 5,000 Units, Oral, DAILY    George Villar, PharmD, MS, BCACP, [facility]  [facility]  Phone: [phone]  Fax: [phone]  On call: [phone]  General scheduling/information [phone]  For emergencies please dial 911  Please do not use [employer] for urgent matters, call the phone numbers listed above.    This note was created using voice recognition software (Dragon). The accuracy of the dictation is limited by the abilities of the software. I have reviewed the note prior to signing, however some errors in grammar and context are still possible. If you have any questions related to this note please do not hesitate to contact our office.

## 2022-09-14 RX ORDER — LOSARTAN POTASSIUM 50 MG/1
50 TABLET ORAL DAILY
Qty: 90 TABLET | Refills: 3 | OUTPATIENT
Start: 2022-09-14

## 2022-10-04 ENCOUNTER — ANTICOAGULATION MONITORING (OUTPATIENT)
Dept: VASCULAR LAB | Facility: MEDICAL CENTER | Age: 82
End: 2022-10-04
Payer: MEDICARE

## 2022-10-04 DIAGNOSIS — G45.9 TIA (TRANSIENT ISCHEMIC ATTACK): ICD-10-CM

## 2022-10-04 DIAGNOSIS — I48.19 PERSISTENT ATRIAL FIBRILLATION (HCC): ICD-10-CM

## 2022-10-04 LAB — INR PPP: 2.1 (ref 2–3.5)

## 2022-10-04 NOTE — PROGRESS NOTES
Anticoagulation Summary  As of 10/4/2022      INR goal:  2.0-3.0   TTR:  67.2 % (5 y)   INR used for dosin.10 (10/4/2022)   Warfarin maintenance plan:  2.5 mg (5 mg x 0.5) every Mon, Wed, Fri; 5 mg (5 mg x 1) all other days   Weekly warfarin total:  27.5 mg   Plan last modified:  Mariam Evans, Pharmacy Intern (2022)   Next INR check:  10/25/2022   Target end date:  Indefinite    Indications    TIA (transient ischemic attack) [G45.9]  Atrial fibrillation (CMS-HCC) [I48.91] [I48.91]                 Anticoagulation Episode Summary       INR check location:  Outside Lab    Preferred lab:      Send INR reminders to:      Comments:  [facility]          Anticoagulation Care Providers       Provider Role Specialty Phone number    Narciso Contreras M.D.  Cardiovascular Disease (Cardiology) [phone]    AISHWARYA MuellerP.N. Responsible Cardiovascular Disease (Cardiology) [phone]    AISHWARYA BartonP.NShaan Responsible Cardiovascular Disease (Cardiology) [phone]    [facility] Responsible  [phone]          Anticoagulation Patient Findings        HPI:  Laurie Adams, on anticoagulation therapy with warfarin for TIA.   Changes to current medical/health status since last appt: none  Denies signs/symptoms of bleeding and/or thrombosis since the last appt.    Denies any interval changes to diet  Denies any interval changes to medications since last appt.   Denies any complications or cost restrictions with current therapy.     A/P   INR  therapeutic.   Pt is to continue with current warfarin dosing regimen.     Next INR in 3 week(s).    Tyree Helton, PharmD

## 2022-10-12 DIAGNOSIS — I48.0 PAF (PAROXYSMAL ATRIAL FIBRILLATION) (HCC): ICD-10-CM

## 2022-10-12 DIAGNOSIS — G45.9 TRANSIENT CEREBRAL ISCHEMIA, UNSPECIFIED TYPE: ICD-10-CM

## 2022-10-12 RX ORDER — WARFARIN SODIUM 5 MG/1
TABLET ORAL
Qty: 100 TABLET | Refills: 1 | Status: SHIPPED | OUTPATIENT
Start: 2022-10-12 | End: 2023-01-09

## 2022-10-28 LAB — INR PPP: 1.8 (ref 2–3.5)

## 2022-10-31 ENCOUNTER — ANTICOAGULATION MONITORING (OUTPATIENT)
Dept: VASCULAR LAB | Facility: MEDICAL CENTER | Age: 82
End: 2022-10-31
Payer: MEDICARE

## 2022-10-31 DIAGNOSIS — I48.19 PERSISTENT ATRIAL FIBRILLATION (HCC): ICD-10-CM

## 2022-10-31 DIAGNOSIS — G45.9 TIA (TRANSIENT ISCHEMIC ATTACK): ICD-10-CM

## 2022-10-31 NOTE — PROGRESS NOTES
Anticoagulation Summary  As of 10/31/2022      INR goal:  2.0-3.0   TTR:  66.7 % (5.1 y)   INR used for dosin.80 (10/28/2022)   Warfarin maintenance plan:  2.5 mg (5 mg x 0.5) every Mon, Wed, Fri; 5 mg (5 mg x 1) all other days   Weekly warfarin total:  27.5 mg   Plan last modified:  Mariam Evans, Pharmacy Intern (2022)   Next INR check:  2022   Target end date:  Indefinite    Indications    TIA (transient ischemic attack) [G45.9]  Atrial fibrillation (CMS-HCC) [I48.91] [I48.91]                 Anticoagulation Episode Summary       INR check location:  Outside Lab    Preferred lab:      Send INR reminders to:      Comments:  [facility]          Anticoagulation Care Providers       Provider Role Specialty Phone number    Narciso Contreras M.D.  Cardiovascular Disease (Cardiology) [phone]    AISHWARYA MuellerP.N. Responsible Cardiovascular Disease (Cardiology) [phone]    AISHWARYA BartonP.NShaan Responsible Cardiovascular Disease (Cardiology) [phone]    [facility] Responsible  [phone]            Refer to Anticoagulation Patient Findings for HPI  Patient Findings       Negatives:  Signs/symptoms of thrombosis, Signs/symptoms of bleeding, Laboratory test error suspected, Change in health, Change in alcohol use, Change in activity, Upcoming invasive procedure, Emergency department visit, Upcoming dental procedure, Missed doses, Extra doses, Change in medications, Change in diet/appetite, Hospital admission, Bruising, Other complaints            Spoke with pt.  INR is SUB therapeutic.     Pt verifies warfarin weekly dosing.     Will have pt BOLUS x 1 dose w/ 5 mg and then continue on w/ her current regimen.    Repeat INR in 2 week(s) from last INR.     Nick Wolfe, MarinD, BCACP

## 2022-11-14 LAB — INR PPP: 1.8 (ref 2–3.5)

## 2022-11-15 ENCOUNTER — ANTICOAGULATION MONITORING (OUTPATIENT)
Dept: VASCULAR LAB | Facility: MEDICAL CENTER | Age: 82
End: 2022-11-15
Payer: MEDICARE

## 2022-11-15 DIAGNOSIS — G45.9 TIA (TRANSIENT ISCHEMIC ATTACK): ICD-10-CM

## 2022-11-15 DIAGNOSIS — I48.19 PERSISTENT ATRIAL FIBRILLATION (HCC): ICD-10-CM

## 2022-11-15 NOTE — PROGRESS NOTES
Anticoagulation Summary  As of 11/15/2022      INR goal:  2.0-3.0   TTR:  66.2 % (5.1 y)   INR used for dosin.80 (2022)   Warfarin maintenance plan:  2.5 mg (5 mg x 0.5) every Mon, Fri; 5 mg (5 mg x 1) all other days; Starting 11/15/2022   Weekly warfarin total:  30 mg   Plan last modified:  Stephania Case (11/15/2022)   Next INR check:  2022   Target end date:  Indefinite    Indications    TIA (transient ischemic attack) [G45.9]  Atrial fibrillation (CMS-HCC) [I48.91] [I48.91]                 Anticoagulation Episode Summary       INR check location:  Outside Lab    Preferred lab:      Send INR reminders to:      Comments:  [facility]          Anticoagulation Care Providers       Provider Role Specialty Phone number    Narciso Contreras M.D.  Cardiovascular Disease (Cardiology) [phone]    Lynne Doyle A.P.N. Responsible Cardiovascular Disease (Cardiology) [phone]    AISHWARYA BartonPShaanNShaan Responsible Cardiovascular Disease (Cardiology) [phone]    [facility] Responsible  [phone]            Refer to Anticoagulation Patient Findings for HPI  Patient Findings       Positives:  Change in diet/appetite    Negatives:  Signs/symptoms of thrombosis, Signs/symptoms of bleeding, Laboratory test error suspected, Change in health, Change in alcohol use, Change in activity, Upcoming invasive procedure, Emergency department visit, Upcoming dental procedure, Missed doses, Extra doses, Change in medications, Hospital admission, Bruising, Other complaints    Comments:  Ate more leafy greens. Salads.            Spoke with patient.  INR is sub therapeutic.     Pt verifies warfarin weekly dosing.     Pt is NOT on antiplatelet therapy     Will have pt bolus today with 1.5 tabs and then increase Wednesday doses to 1 tab given recent back to back subtherapeutics.    Repeat INR in 1 week(s).     Stephania Case  Discussed with Sandrine, MarinD, BCACP

## 2022-11-21 ENCOUNTER — ANTICOAGULATION MONITORING (OUTPATIENT)
Dept: VASCULAR LAB | Facility: MEDICAL CENTER | Age: 82
End: 2022-11-21
Payer: MEDICARE

## 2022-11-21 DIAGNOSIS — G45.9 TIA (TRANSIENT ISCHEMIC ATTACK): ICD-10-CM

## 2022-11-21 DIAGNOSIS — I48.91 ATRIAL FIBRILLATION, UNSPECIFIED TYPE (HCC): ICD-10-CM

## 2022-11-21 DIAGNOSIS — I48.19 PERSISTENT ATRIAL FIBRILLATION (HCC): ICD-10-CM

## 2022-11-21 LAB — INR PPP: 2.1 (ref 2–3.5)

## 2022-11-22 NOTE — PROGRESS NOTES
OP Anticoagulation Service Note    Date: 2022    Anticoagulation Summary  As of 2022      INR goal:  2.0-3.0   TTR:  66.1 % (5.1 y)   INR used for dosin.10 (2022)   Warfarin maintenance plan:  2.5 mg (5 mg x 0.5) every Mon, Fri; 5 mg (5 mg x 1) all other days; Starting 2022   Weekly warfarin total:  30 mg   Plan last modified:  Stephania Case (11/15/2022)   Next INR check:  2022   Target end date:  Indefinite    Indications    TIA (transient ischemic attack) [G45.9]  Atrial fibrillation (CMS-HCC) [I48.91] [I48.91]                 Anticoagulation Episode Summary       INR check location:  Outside Lab    Preferred lab:      Send INR reminders to:      Comments:  [facility]          Anticoagulation Care Providers       Provider Role Specialty Phone number    Narciso Contreras M.D.  Cardiovascular Disease (Cardiology) [phone]    Lynne Doyle AShaanP.N. Responsible Cardiovascular Disease (Cardiology) [phone]    AISHWARYA MartePShaanNShaan Responsible Cardiovascular Disease (Cardiology) [phone]    [facility] Responsible  [phone]          Anticoagulation Patient Findings        Voice message for patient regarding their anticoagulant.   Patient's preferred phone number:  [phone]        HPI:   The reason for today's call is to prevent morbidity and mortality from a blood clot and/or stroke and to reduce the risk of bleeding while on a anticoagulant.     PCP:  Nohelia Leong P.A.-C.  [address] [place] [address]    Assessment:     INR  therapeutic.     Lab Results   Component Value Date/Time    BUN 31 (H) 2021 04:31 AM    CREATININE 0.95 2021 04:31 AM    CREATININE 0.8 2007 09:45 AM     Lab Results   Component Value Date/Time    HEMOGLOBIN 13.2 2021 04:31 AM    HEMATOCRIT 41.3 2021 04:31 AM    PLATELETCT 281 2021 04:31 AM    ALKPHOSPHAT 83 2021 02:09 AM    ASTSGOT 24  05/09/2021 02:09 AM    ALTSGPT 14 05/09/2021 02:09 AM          Current Outpatient Medications:     warfarin, TAKE 1 TABLET BY MOUTH ONCE DAILY AS DIRECTED BY  [facility]  ANTICOAGULATION  SERVICES    losartan, Take 1 tablet by mouth once daily    ezetimibe, 10 mg, Oral, Q EVENING    flecainide, 150 mg, Oral, BID    atorvastatin, 80 mg, Oral, Q EVENING    metoprolol SR, Take 1 tablet by mouth once daily    sertraline, 150 mg, Oral, DAILY    albuterol, 2 Puff, Inhalation, Q6HRS PRN    Biotin, 1,000 mcg, Oral, DAILY    PreserVision AREDS 2, 1 Capsule, Oral, DAILY    Vitamin D-3, 5,000 Units, Oral, DAILY    Vitamin B-12, 5,000 Units, Oral, DAILY      Plan:     Continue the same warfarin dose, as noted above.       Follow-up:     test in 2 weeks.        Additional information discussed with patient:     Asked patient to please call the anticoagulation clinic if they have any signs/symptoms of bleeding and/or thrombosis or any changes to diet or medications.      National recommendations regarding anticoagulation therapy:     The CHEST guidelines recommends frequent INR monitoring at regular intervals (a few days up to a max of 12 weeks) to ensure patients are on the proper dose of warfarin, and patients are not having any complications from therapy.  INRs can dramatically change over a short time period due to diet, medications, and medical conditions.       George Villar, PharmD, MS, BCACP, [facility]  Phone: [phone]  Fax: [phone]  On call: [phone]  General scheduling/information [phone]  For emergencies please dial [phone]  Please do not use [facility] for urgent matters, call the phone numbers listed above.    This note was created using voice recognition software (Dragon). The accuracy of the dictation is limited by the abilities of the software. I have reviewed the note prior to signing, however some errors in grammar and context are still possible. If you have any  questions related to this note please do not hesitate to contact our office.

## 2022-12-22 ENCOUNTER — TELEPHONE (OUTPATIENT)
Dept: CARDIOLOGY | Facility: MEDICAL CENTER | Age: 82
End: 2022-12-22
Payer: MEDICARE

## 2022-12-22 NOTE — TELEPHONE ENCOUNTER
Called and LVM for pt regarding appt with SC. Appt reschedule to 12/30/22 at 10:30am. Pt is to call us back to confirm new appt with SC.

## 2022-12-27 ENCOUNTER — TELEPHONE (OUTPATIENT)
Dept: CARDIOLOGY | Facility: MEDICAL CENTER | Age: 82
End: 2022-12-27
Payer: MEDICARE

## 2022-12-27 NOTE — TELEPHONE ENCOUNTER
SC    Caller: Laurie Adams    Topic/issue: WARFARIN    Patient states that she is no longer able to come to [place] to get this medication filled. She would like to know if it is possible to change her medication to ELIQUIS instead. Please advise.    Thank you,  Tobias THOMAS    Callback Number: [phone] (home)

## 2022-12-27 NOTE — TELEPHONE ENCOUNTER
Phone Number Called: [phone]    Call outcome: Did not leave a detailed message. Requested patient to call back.    Message: Called to discuss medication changes per patient. Asked patient to call back.

## 2022-12-30 ENCOUNTER — APPOINTMENT (OUTPATIENT)
Dept: CARDIOLOGY | Facility: MEDICAL CENTER | Age: 82
End: 2022-12-30
Payer: MEDICARE

## 2023-01-03 NOTE — TELEPHONE ENCOUNTER
Patient still wants to change to Eliquis. Stated she can't do home INR readings. What Rx would you like me to put in?

## 2023-01-03 NOTE — TELEPHONE ENCOUNTER
Caller: Laurie Adams    Topic/issue: ELIQUIS    Patient is still requesting that SC change her medication from WARFARIN to ELIQUIS. Please advise.    Thank you,  Tobias THOMAS    Callback Number: [phone] (home)

## 2023-01-04 ENCOUNTER — TELEPHONE (OUTPATIENT)
Dept: VASCULAR LAB | Facility: MEDICAL CENTER | Age: 83
End: 2023-01-04
Payer: MEDICARE

## 2023-01-04 NOTE — TELEPHONE ENCOUNTER
Sent to [facility] to transition to Eliquis. OK to transition to eliquis 5 mg BID per SC.

## 2023-01-05 NOTE — TELEPHONE ENCOUNTER
No updated INR result noted - called pt to discuss.    Pt states she will go to the lab tomorrow.    Will f/u accordingly.    Nick Wolfe, MarinD, BCACP

## 2023-01-05 NOTE — TELEPHONE ENCOUNTER
Arely Pfeiffer R.N.  Amb Anticoag Pool Yesterday (4:17 PM)     KD  Patient is wanting to switch to Eliquis. Can you help with this transition please.      Called pt regarding the above - INR must be < 2 prior to transition from warfarin to Eliquis.    Noted that pt is overdue for repeat INR - she will go to the lab tomorrow.    Will f/u w/ repeat INR tomorrow and discuss transition to Eliquis at this time.    Nick Wolfe, PharmD, BCACP

## 2023-01-06 LAB — INR PPP: 1.5 (ref 2–3.5)

## 2023-01-09 ENCOUNTER — TELEPHONE (OUTPATIENT)
Dept: VASCULAR LAB | Facility: MEDICAL CENTER | Age: 83
End: 2023-01-09
Payer: MEDICARE

## 2023-01-09 ENCOUNTER — ANTICOAGULATION MONITORING (OUTPATIENT)
Dept: CARDIOLOGY | Facility: MEDICAL CENTER | Age: 83
End: 2023-01-09
Payer: MEDICARE

## 2023-01-09 DIAGNOSIS — G45.9 TIA (TRANSIENT ISCHEMIC ATTACK): ICD-10-CM

## 2023-01-09 DIAGNOSIS — I48.19 PERSISTENT ATRIAL FIBRILLATION (HCC): ICD-10-CM

## 2023-01-09 NOTE — TELEPHONE ENCOUNTER
Pt was supposed to get INR drawn prior to transitioning to Eliquis    Pt went to [place] 1/6  Called medical records to fax over INR result    Sandrine Mahoney, PharmD

## 2023-01-10 NOTE — PROGRESS NOTES
Anticoagulation Summary  As of 2023      INR goal:  2.0-3.0   TTR:  64.9 % (5.3 y)   INR used for dosin.50 (2023)   Warfarin maintenance plan:  5 mg (5 mg x 1) every day   Weekly warfarin total:  35 mg   Plan last modified:  Sandrine Mahoney, PharmD (2023)   Next INR check:  2023   Target end date:  Indefinite    Indications    TIA (transient ischemic attack) [G45.9]  Atrial fibrillation (CMS-HCC) [I48.91] [I48.91]                 Anticoagulation Episode Summary       INR check location:  Outside Lab    Preferred lab:      Send INR reminders to:      Comments:  [facility]          Anticoagulation Care Providers       Provider Role Specialty Phone number    Narciso Contreras M.D.  Cardiovascular Disease (Cardiology) [phone]    AISHWARYA MuellerP.NShaan Responsible Cardiovascular Disease (Cardiology) [phone]    JEANNA Marte.P.NShaan Responsible Cardiovascular Disease (Cardiology) [phone]    [facility] Anticoagulation Services Responsible  [phone]            Refer to Anticoagulation Patient Findings for HPI  Patient Findings       Positives:  Missed doses, Extra doses (has been taking 5mg daily)    Negatives:  Signs/symptoms of thrombosis, Signs/symptoms of bleeding, Laboratory test error suspected, Change in health, Change in alcohol use, Change in activity, Upcoming invasive procedure, Emergency department visit, Upcoming dental procedure, Change in medications, Change in diet/appetite, Hospital admission, Bruising, Other complaints            Spoke with pt.  INR is sub therapeutic.     Pt verifies warfarin weekly dosing.     Will have pt bolus w/ 7.5mg x 1 day then continue regimen    Pt unsure if Eliquis was sent to the pharmacy. She hasn't picked up. I see no record of Eliquis being sent over. Will send Eliquis script to pharmacy. Pt to check on price. Re-iterated importance of calling our office if she is able to  Eliquis.    Repeat INR in 1 week(s) if unable to  afford Eliquis.     Sandrine Mahoney, PharmD

## 2023-01-17 ENCOUNTER — TELEPHONE (OUTPATIENT)
Dept: VASCULAR LAB | Facility: MEDICAL CENTER | Age: 83
End: 2023-01-17
Payer: MEDICARE

## 2023-01-19 ENCOUNTER — TELEPHONE (OUTPATIENT)
Dept: VASCULAR LAB | Facility: MEDICAL CENTER | Age: 83
End: 2023-01-19
Payer: MEDICARE

## 2023-01-19 NOTE — TELEPHONE ENCOUNTER
LM with pt asking if she was able to  Eliquis or get INR drawn.    2nd call.    Nick Wolfe, MarinD, BCACP

## 2023-01-23 ENCOUNTER — TELEPHONE (OUTPATIENT)
Dept: VASCULAR LAB | Facility: MEDICAL CENTER | Age: 83
End: 2023-01-23
Payer: MEDICARE

## 2023-01-23 ENCOUNTER — ANTICOAGULATION MONITORING (OUTPATIENT)
Dept: VASCULAR LAB | Facility: MEDICAL CENTER | Age: 83
End: 2023-01-23
Payer: MEDICARE

## 2023-01-23 DIAGNOSIS — G45.9 TIA (TRANSIENT ISCHEMIC ATTACK): ICD-10-CM

## 2023-01-23 DIAGNOSIS — I48.19 PERSISTENT ATRIAL FIBRILLATION (HCC): ICD-10-CM

## 2023-01-23 NOTE — PROGRESS NOTES
Discharged from [facility] as pt was switched to [facility] and will be managed by cardiology  Sandrine Mahoney, PharmD

## 2023-01-23 NOTE — TELEPHONE ENCOUNTER
S/w pt - Eliquis was affordable at $160 for 3 months.    Pt states she already has stopped warfarin and is taking Eliquis. She denies any bleeding.    Defer Eliquis mgt to cardiology. Will d/c from [facility]    Sandrine Mahoney, PharmD

## 2023-02-24 ENCOUNTER — APPOINTMENT (OUTPATIENT)
Dept: CARDIOLOGY | Facility: MEDICAL CENTER | Age: 83
End: 2023-02-24
Payer: MEDICARE

## 2023-03-21 ENCOUNTER — TELEPHONE (OUTPATIENT)
Dept: CARDIOLOGY | Facility: MEDICAL CENTER | Age: 83
End: 2023-03-21
Payer: MEDICARE

## 2023-03-22 ENCOUNTER — OFFICE VISIT (OUTPATIENT)
Dept: CARDIOLOGY | Facility: MEDICAL CENTER | Age: 83
End: 2023-03-22
Payer: MEDICARE

## 2023-03-22 VITALS
WEIGHT: 190 LBS | HEIGHT: 63 IN | RESPIRATION RATE: 12 BRPM | OXYGEN SATURATION: 94 % | BODY MASS INDEX: 33.66 KG/M2 | DIASTOLIC BLOOD PRESSURE: 64 MMHG | HEART RATE: 64 BPM | SYSTOLIC BLOOD PRESSURE: 96 MMHG

## 2023-03-22 DIAGNOSIS — I48.19 PERSISTENT ATRIAL FIBRILLATION (HCC): ICD-10-CM

## 2023-03-22 DIAGNOSIS — G45.9 TIA (TRANSIENT ISCHEMIC ATTACK): ICD-10-CM

## 2023-03-22 DIAGNOSIS — I10 PRIMARY HYPERTENSION: ICD-10-CM

## 2023-03-22 PROCEDURE — 93000 ELECTROCARDIOGRAM COMPLETE: CPT | Performed by: INTERNAL MEDICINE

## 2023-03-22 PROCEDURE — 99214 OFFICE O/P EST MOD 30 MIN: CPT | Mod: 25 | Performed by: INTERNAL MEDICINE

## 2023-03-22 ASSESSMENT — FIBROSIS 4 INDEX: FIB4 SCORE: 1.89

## 2023-03-22 NOTE — PROGRESS NOTES
"Arrhythmia Clinic Note (New Patient)    DOS: 3/22/2023    Referring physician: Dr. Arita    Chief complaint/Reason for consult: Persistent AF    HPI:  Pt is an 83 yo F. She has a history of HTN, and persistent AF. TIA in the past. Chronic lung disease. She sees Dr. Arita and Lynne Doran. As far as I can tell, she has been in persistent AF, long standing, probably since 2021. Zio done in 2022 showing 100% continuous AF. Unclear why then she has been continued on high dose flecainide even though this is clearly ineffective. She presented with worsening SOB to [facility]. There was some concern that chaparro was contributing to her symptoms as ventricular rates were in the 50s, mildly chaparro and I was called to potentially transfer for PPM, though I did not think her chaparro was severe enough to merit urgent PPM.    ROS (+in BOLD):  Constitutional: Fevers/chills/fatigue/weightloss  HEENT: Blurry vision/eye pain/sore throat/hearing loss  Respiratory: Shortness of breath/cough  Cardiovascular: Chest pain/palpitations/edema/orthopnea/syncope  GI: Nausea/vomitting/diarrhea  MSK: Arthralgias/myagias/muscle weakness  Skin: Rash/sores  Neurological: Numbness/tremors/vertigo  Endocrine: Excessive thirst/polyuria/cold intolerance/heat intolerance  Psych: Depression/anxiety    Past Medical History:   Diagnosis Date    Arrhythmia 03/2015    A-fib takes flecainide    Arthritis     fingers- osteo    Breath shortness     02 @ 2L NOC    Bronchial asthma 6/29/2012    Inhalers prn    Bronchitis     Cancer (HCC) 2005    squamous cell skin left chest    Cataract     galo IOL    Cerebral atherosclerosis     Cold     Pt had \"head cold 6/1, surgery cristofer from 6/4 to 6/7, pt denies productive cough and SOB    Dependence on supplemental oxygen     2 liters at night.    Dizziness and giddiness     Episodic since ~2009    Dyslipidemia, goal LDL below 100 6/29/2012    High cholesterol     History of tobacco use     HTN (hypertension) " 2012    Obesity 2012    Pneumonia     2015    Stroke (HCC) 2010    [id number], multiple TIA's, generalized weakness    TIA (transient ischemic attack) 2012    Urinary incontinence        Past Surgical History:   Procedure Laterality Date    KNEE ARTHROPLASTY TOTAL Right 2018    Procedure: KNEE ARTHROPLASTY TOTAL;  Surgeon: Eric Bunn M.D.;  Location: SURGERY [facility];  Service: Orthopedics    CATARACT PHACO WITH IOL  2013    Performed by Noe Jean M.D. at SURGERY [facility]    CATARACT PHACO WITH IOL  2013    Performed by Noe Jean M.D. at [facility]    SEPTOPLASTY  2009    Performed by PABLITO LORENZANA at SURGERY [facility]    SOMNOPLASTY  2009    Performed by PABLITO LORENZANA at SURGERY [facility]    ANTROSTOMY  2009    Performed by PABLITO LORENZANA at SURGERY [facility]    ETHMOIDECTOMY  2009    Performed by PABLITO LORENZANA at SURGERY [facility]    OTHER  2008    sinus    COLON RESECTION      CHOLECYSTECTOMY  1988    APPENDECTOMY      OTHER      mastoid    GANGLION EXCISION Bilateral ,     left wrist, right finger    HYSTERECTOMY, VAGINAL      MASTOIDECTOMY      DC CHOLECYSTOENTEROSTOMY+VIVIANA-EN-Y      DC NASAL SCOPY,REPR CSF LEAK,SPHENOID      WRIST FUSION         Social History     Socioeconomic History    Marital status:      Spouse name: Not on file    Number of children: Not on file    Years of education: Not on file    Highest education level: Not on file   Occupational History    Not on file   Tobacco Use    Smoking status: Former     Packs/day: 1.00     Years: 40.00     Pack years: 40.00     Types: Cigarettes     Quit date: 1999     Years since quittin.6    Smokeless tobacco: Never   Vaping Use    Vaping Use: Never used   Substance and Sexual Activity    Alcohol use: No    Drug use: No    Sexual activity: Yes     Partners: Male   Other Topics  Concern    Not on file   Social History Narrative    Not on file     Social Determinants of Health     Financial Resource Strain: Not on file   Food Insecurity: Not on file   Transportation Needs: Not on file   Physical Activity: Not on file   Stress: Not on file   Social Connections: Not on file   Intimate Partner Violence: Not on file   Housing Stability: Not on file       History reviewed. No pertinent family history.    Allergies   Allergen Reactions    Feldene [Piroxicam] Photosensitivity    Sulfa Drugs Hives    Codeine Anxiety    Mucinex      Other reaction(s): chest pain       Current Outpatient Medications   Medication Sig Dispense Refill    apixaban (ELIQUIS) 5mg Tab Take 1 Tablet by mouth 2 times a day. 180 Tablet 3    losartan (COZAAR) 50 MG Tab Take 1 tablet by mouth once daily 90 Tablet 3    ezetimibe (ZETIA) 10 MG Tab Take 1 Tablet by mouth every evening. 90 Tablet 3    atorvastatin (LIPITOR) 80 MG tablet Take 1 Tablet by mouth every evening. 90 Tablet 3    metoprolol SR (TOPROL XL) 100 MG TABLET SR 24 HR Take 1 tablet by mouth once daily (Patient taking differently: 50 mg. 5omg DAILY) 90 Tablet 3    sertraline (ZOLOFT) 100 MG Tab Take 50 mg by mouth every day. 50mg DAILY 30 tablet 11    albuterol (VENTOLIN HFA) 108 (90 Base) MCG/ACT Aero Soln inhalation aerosol Inhale 2 Puffs every 6 hours as needed for Shortness of Breath. 18 g 0    Biotin 1000 MCG Tab Take 1,000 mcg by mouth every day.      Multiple Vitamins-Minerals (PRESERVISION AREDS 2) Cap Take 1 capsule by mouth every day.      Cholecalciferol (VITAMIN D-3) 125 MCG (5000 UT) Tab Take 5,000 Units by mouth every day.      Cyanocobalamin (VITAMIN B-12) 5000 MCG TABLET DISPERSIBLE Take 5,000 Units by mouth every day.       No current facility-administered medications for this visit.       Physical Exam:  Vitals:    03/22/23 0833   BP: 96/64   BP Location: Right arm   Patient Position: Sitting   BP Cuff Size: Large adult   Pulse: 64   Resp: 12  "  SpO2: 94%   Weight: 86.2 kg (190 lb)   Height: 1.6 m (5' 3\")     General appearance: NAD, conversant  Eyes: anicteric sclerae, no lid-lag; PERRLA  HENT: Atraumatic; moist mucous membranes, no ulcerations  Neck: Trachea midline; FROM, supple, no thyromegaly  Lungs: CTA, with normal respiratory effort and no intercostal retractions  CV: irregularly irregular, no MRGs, no JVD  Abdomen: Soft, non-tender; normal bowel sounds, no HSM  Extremities: No peripheral edema. No clubbing or cyanosis.  Skin: Normal temperature, turgor and texture; no rash or ulcers  Psych: Appropriate affect, alert and oriented to person, place and time    Data:  Labs reviewed    Prior echo/stress reviewed:  LVEF 65%    EKG interpreted by me:  Coarse fib    Impression/Plan:  1. Persistent atrial fibrillation (HCC)  EKG    [facility] ZIO PATCH MONITOR      2. Primary hypertension        3. TIA (transient ischemic attack)          -Unclear on why flecainide is being continued despite continued persistent fib as clearly ineffective  -I will stop the flec  -Repeat zio to look for rates  -Get outside echo images    Harini Andrade MD            "

## 2023-03-27 ENCOUNTER — NON-PROVIDER VISIT (OUTPATIENT)
Dept: CARDIOLOGY | Facility: MEDICAL CENTER | Age: 83
End: 2023-03-27
Payer: MEDICARE

## 2023-03-27 DIAGNOSIS — I48.91 ATRIAL FIBRILLATION WITH RVR (HCC): ICD-10-CM

## 2023-03-27 DIAGNOSIS — I48.19 PERSISTENT ATRIAL FIBRILLATION (HCC): ICD-10-CM

## 2023-03-27 DIAGNOSIS — I49.3 PVC'S (PREMATURE VENTRICULAR CONTRACTIONS): ICD-10-CM

## 2023-03-27 PROCEDURE — 93248 EXT ECG>7D<15D REV&INTERPJ: CPT | Performed by: INTERNAL MEDICINE

## 2023-03-27 NOTE — PROGRESS NOTES
Message to Cassandra 06/14    Printed report placed on SS desk 06/02/23    Message to  05/20/23.    Home enrollment completed in the 14 day [employer] XT Holter monitoring program, per Harini Andrade M.D.  Monitor will be shipped to patient via [employer].  Pending EOS.

## 2023-04-12 ENCOUNTER — TELEPHONE (OUTPATIENT)
Dept: CARDIOLOGY | Facility: MEDICAL CENTER | Age: 83
End: 2023-04-12
Payer: MEDICARE

## 2023-04-12 NOTE — TELEPHONE ENCOUNTER
SS          Caller: Liu(pt's son)      Topic/issue: Patient was calling about his mother's care and asked for a call back so that he knows what's going on with his mother's care and what, if any, care plan is in place for her    Callback Number: [phone]      Thank you    -Donnell CHANCE

## 2023-04-13 NOTE — TELEPHONE ENCOUNTER
S/w shandra on POC, his questions are that Laurie does not feel good and has not been out of bed more than a few times since appt with SS. Wondering is she has any restrictions.    Advised activity as tolerated, she should try to get up and eat meals from her chair at least.     If pt overly symptomatic or rate above 120 for more than an hour go to ED for management.     Son grateful for call and will call back with any further questions.

## 2023-04-18 ENCOUNTER — APPOINTMENT (OUTPATIENT)
Dept: CARDIOLOGY | Facility: MEDICAL CENTER | Age: 83
End: 2023-04-18
Payer: MEDICARE

## 2023-04-24 ENCOUNTER — TELEPHONE (OUTPATIENT)
Dept: CARDIOLOGY | Facility: MEDICAL CENTER | Age: 83
End: 2023-04-24
Payer: MEDICARE

## 2023-04-24 NOTE — TELEPHONE ENCOUNTER
SS    Caller: Laurie Adams    Topic/issue: Patient is asking for a call back to confirm that event monitor results were received by our office and to go over the results with a nurse. Please advise.     Callback Number: [phone] (home)     Thank you,   Chinyere MARCUS

## 2023-04-26 ENCOUNTER — TELEPHONE (OUTPATIENT)
Dept: CARDIOLOGY | Facility: MEDICAL CENTER | Age: 83
End: 2023-04-26
Payer: MEDICARE

## 2023-04-28 NOTE — TELEPHONE ENCOUNTER
SS    Caller: Laurie    Topic/issue: Pt was returning both of Cassandra's calls yesterday. I tried your ext, but went to . Pt is wanting to get her monitor results.    Callback Number: [phone]

## 2023-04-28 NOTE — TELEPHONE ENCOUNTER
Returned patient's phone call. Event monitor showed continuous a fib, which is known per SS note. Per patient she saw her PCP today and he increased her Metoprolol but she wasn't able to tell me the dose as she hasn't picked it up yet. Patient taking all other medications as prescribed. Patient would like a follow up with SS once back in office to review next steps for plan of care. All questions/concerns answered at this time, patient appreciative of information given.      To SS: Patient waiting for your return to office to advise on next steps for her a fib. Thank you!

## 2023-05-15 NOTE — ASSESSMENT & PLAN NOTE
--completed antibiotic regimen  --reports mild throat soreness  --follow-up appointment scheduled with [facility] the patient has adequate transportation to   Multifactorial -- likely in the setting of pulmonary edema and  COPD exacerbation  Abx  Diuresis  Nebs treatment, steroid

## 2023-06-12 LAB — EKG IMPRESSION: NORMAL

## 2023-06-21 ENCOUNTER — TELEPHONE (OUTPATIENT)
Dept: CARDIOLOGY | Facility: MEDICAL CENTER | Age: 83
End: 2023-06-21
Payer: MEDICARE

## 2023-06-21 DIAGNOSIS — I48.19 PERSISTENT ATRIAL FIBRILLATION (HCC): ICD-10-CM

## 2023-06-21 RX ORDER — DIGOXIN 125 MCG
125 TABLET ORAL DAILY
Qty: 90 TABLET | Refills: 1 | Status: SHIPPED | OUTPATIENT
Start: 2023-06-21 | End: 2023-12-19

## 2023-06-21 NOTE — TELEPHONE ENCOUNTER
----- Message from Harini Andrade M.D. sent at 6/21/2023 10:11 AM PDT -----  Regarding: RE: Read and Sign Zio Tracings  Yes  ----- Message -----  From: Cassandra Gauthier R.N.  Sent: 6/19/2023   6:09 AM PDT  To: Harini Andrade M.D.  Subject: RE: Read and Sign Zio Tracings                   PCP increased metop back in April when this was completed. He was not sure what dose at the time. I will touch base with him on how his rates are doing. Still add dig in addition to inc metop?  ----- Message -----  From: Harini Andrade M.D.  Sent: 6/16/2023   8:13 AM PDT  To: Cassandra Gauthier R.N.  Subject: RE: Read and Sign Zio Tracings                   Can we add dig 125 mcg daily to her regimen. Rates are just tad too fast but not terrible.    ----- Message -----  From: Piper Leon  Sent: 5/20/2023   9:08 AM PDT  To: Harini Andrade M.D.  Subject: Read and Sign Zio Tracings                       Please read and sign these Zio Tracings scanned on 04/26/23  EOS 04/05/23-04/19/23.  Thank you.

## 2023-06-21 NOTE — TELEPHONE ENCOUNTER
S/w pt occasional inc hr episodes despite inc metop. Willing to try digoxin.    She will call and update us in a few weeks    Attending Gonzales: Gen: NAD, heent: atrauamtic, eomi, perrla, mmm, op pink, uvula midline, neck; nttp, no nuchal rigidity, chest: nttp, no crepitus, cv: rrr, no murmurs, lungs: b/l rales, abd: soft, nontender, nondistended, no peritoneal signs, +BS, no guarding, ext: LE pitting edema, neg homans, skin: no rash, neuro: awake and alert, following commands, speech clear, sensation and strength intact, no focal deficits

## 2023-08-16 DIAGNOSIS — E78.5 DYSLIPIDEMIA, GOAL LDL BELOW 100: ICD-10-CM

## 2023-08-22 RX ORDER — EZETIMIBE 10 MG/1
10 TABLET ORAL EVERY EVENING
Qty: 90 TABLET | Refills: 2 | Status: SHIPPED | OUTPATIENT
Start: 2023-08-22

## 2023-09-24 DIAGNOSIS — I10 ESSENTIAL HYPERTENSION: ICD-10-CM

## 2023-09-25 NOTE — TELEPHONE ENCOUNTER
Is the patient due for a refill? Yes    Was the patient seen the past year? Yes    Date of last office visit: 3/22/2023    Does the patient have an upcoming appointment?  No    Provider to refill:SS    Does the patients insurance require a 100 day supply?  No

## 2023-09-27 RX ORDER — LOSARTAN POTASSIUM 50 MG/1
TABLET ORAL
Qty: 90 TABLET | Refills: 1 | Status: SHIPPED | OUTPATIENT
Start: 2023-09-27

## 2023-12-15 DIAGNOSIS — I48.19 PERSISTENT ATRIAL FIBRILLATION (HCC): ICD-10-CM

## 2023-12-18 NOTE — TELEPHONE ENCOUNTER
Is the patient due for a refill? Yes    Was the patient seen the past year? Yes    Date of last office visit: 03/22/2023    Does the patient have an upcoming appointment?  No   If yes, When?     Provider to refill:SS    Does the patients insurance require a 100 day supply?  No

## 2023-12-19 RX ORDER — DIGOXIN 125 MCG
125 TABLET ORAL DAILY
Qty: 90 TABLET | Refills: 0 | Status: SHIPPED | OUTPATIENT
Start: 2023-12-19

## 2024-01-27 DIAGNOSIS — G45.9 TIA (TRANSIENT ISCHEMIC ATTACK): ICD-10-CM

## 2024-01-27 DIAGNOSIS — I48.19 PERSISTENT ATRIAL FIBRILLATION (HCC): ICD-10-CM

## 2024-01-29 RX ORDER — APIXABAN 5 MG/1
5 TABLET, FILM COATED ORAL 2 TIMES DAILY
Qty: 180 TABLET | Refills: 0 | Status: SHIPPED | OUTPATIENT
Start: 2024-01-29

## 2024-04-22 DIAGNOSIS — G45.9 TIA (TRANSIENT ISCHEMIC ATTACK): ICD-10-CM

## 2024-04-22 DIAGNOSIS — I48.19 PERSISTENT ATRIAL FIBRILLATION (HCC): ICD-10-CM

## (undated) DEVICE — DRAPE IOBAN II INCISE 23X17 - (10EA/BX 4BX/CA)

## (undated) DEVICE — LACTATED RINGERS INJ 1000 ML - (14EA/CA 60CA/PF)

## (undated) DEVICE — KIT ANESTHESIA W/CIRCUIT & 3/LT BAG W/FILTER (20EA/CA)

## (undated) DEVICE — GLOVE BIOGEL PI INDICATOR SZ 6.5 SURGICAL PF LF - (50/BX 4BX/CA)

## (undated) DEVICE — BANDAGE ELASTIC 6 IN X 5 YDS - LATEX FREE (10/BX)

## (undated) DEVICE — GOWN WARMING STANDARD FLEX - (30/CA)

## (undated) DEVICE — WATER IRRIGATION STERILE 1000ML (12EA/CA)

## (undated) DEVICE — DRAPE LARGE 3 QUARTER - (20/CA)

## (undated) DEVICE — HUMID-VENT HEAT AND MOISTURE EXCHANGE- (50/BX)

## (undated) DEVICE — GLOVE BIOGEL PI ORTHO SZ 8 PF LF (40PR/BX)

## (undated) DEVICE — GLOVE BIOGEL PI ORTHO SZ 7 PF LF (40PR/BX)

## (undated) DEVICE — SPONGE GAUZE STER 4X4 8-PL - (2/PK 50PK/BX 12BX/CS)

## (undated) DEVICE — GLOVE BIOGEL SZ 7 SURGICAL PF LTX - (50PR/BX 4BX/CA)

## (undated) DEVICE — TIP INTPLS HFLO ML ORFC BTRY - (12/CS)  FOR SURGILAV

## (undated) DEVICE — SUTURE 2-0 VICRYL PLUS CT-1 - 8 X 18 INCH(12/BX)

## (undated) DEVICE — SODIUM CHL. IRRIGATION 0.9% 3000ML (4EA/CA 65CA/PF)

## (undated) DEVICE — MASK AIRWAY SZ 2.5 UNIQUE SILICON (10EA/BX)

## (undated) DEVICE — BANDAGE ELASTIC STERILE VELCRO 6 X 5 YDS (25EA/CA)

## (undated) DEVICE — STOCKINETTE IMPERVIOUS 12X48 - STERILELF (10/CA)"

## (undated) DEVICE — SUCTION INSTRUMENT YANKAUER BULBOUS TIP W/O VENT (50EA/CA)

## (undated) DEVICE — SODIUM CHL IRRIGATION 0.9% 1000ML (12EA/CA)

## (undated) DEVICE — CHLORAPREP 26 ML APPLICATOR - ORANGE TINT(25/CA)

## (undated) DEVICE — BLADE SAGITTAL SAW DUAL CUT 25.0 X 90.0 X 1.27MM (1/EA)

## (undated) DEVICE — Device

## (undated) DEVICE — PACK TOTAL KNEE  (1/CA)

## (undated) DEVICE — NEPTUNE 4 PORT MANIFOLD - (20/PK)

## (undated) DEVICE — TOURNIQUET CUFF 34 X 4 ONE PORT DISP - STERILE (10/BX)

## (undated) DEVICE — SUTURE 1 VICRYL PLUS CTX - 8 X 18 INCH (12/BX)

## (undated) DEVICE — SYS BN CMNT HI VAC KT MXR BWL - (MIX-E-VAC II)  (10EA/CA)

## (undated) DEVICE — HEAD HOLDER JUNIOR/ADULT

## (undated) DEVICE — SENSOR SPO2 NEO LNCS ADHESIVE (20/BX) SEE USER NOTES

## (undated) DEVICE — ELECTRODE 850 FOAM ADHESIVE - HYDROGEL RADIOTRNSPRNT (50/PK)

## (undated) DEVICE — GLOVE BIOGEL SZ 6.5 SURGICAL PF LTX (50PR/BX 4BX/CA)

## (undated) DEVICE — GLOVE SURGICAL PROTEXIS PI 8.0 LF - (50PR/BX)

## (undated) DEVICE — ELECTRODE DUAL RETURN W/ CORD - (50/PK)

## (undated) DEVICE — CANISTER SUCTION RIGID RED 1500CC (40EA/CA)

## (undated) DEVICE — NEEDLE W/FACET TIP DULL VERSION W/STIMULATION CABLE SONOPLEX 21G X 4 (10/EA)"

## (undated) DEVICE — LENS/HOOD FOR SPACESUIT - (32/PK) PEEL AWAY FACE

## (undated) DEVICE — GLOVE BIOGEL PI ULTRATOUCH SZ 7.5 SURGICAL PF LF -(50/BX 4BX/CA)

## (undated) DEVICE — BAG, SPONGE COUNT 50600

## (undated) DEVICE — SUTURE GENERAL

## (undated) DEVICE — GLOVE BIOGEL SZ 8 SURGICAL PF LTX - (50PR/BX 4BX/CA)

## (undated) DEVICE — PROTECTOR ULNA NERVE - (36PR/CA)

## (undated) DEVICE — GLOVE, LITE (PAIR)

## (undated) DEVICE — HANDPIECE 10FT INTPLS SCT PLS IRRIGATION HAND CONTROL SET (6/PK)

## (undated) DEVICE — TUBE CONNECTING SUCTION - CLEAR PLASTIC STERILE 72 IN (50EA/CA)

## (undated) DEVICE — KIT ROOM DECONTAMINATION

## (undated) DEVICE — MASK ANESTHESIA ADULT  - (100/CA)